# Patient Record
Sex: FEMALE | Race: BLACK OR AFRICAN AMERICAN | NOT HISPANIC OR LATINO | Employment: FULL TIME | URBAN - METROPOLITAN AREA
[De-identification: names, ages, dates, MRNs, and addresses within clinical notes are randomized per-mention and may not be internally consistent; named-entity substitution may affect disease eponyms.]

---

## 2019-06-10 ENCOUNTER — OFFICE VISIT (OUTPATIENT)
Dept: INTERNAL MEDICINE | Facility: CLINIC | Age: 39
End: 2019-06-10
Payer: COMMERCIAL

## 2019-06-10 VITALS
HEART RATE: 71 BPM | DIASTOLIC BLOOD PRESSURE: 80 MMHG | WEIGHT: 218.06 LBS | SYSTOLIC BLOOD PRESSURE: 125 MMHG | HEIGHT: 66 IN | BODY MASS INDEX: 35.04 KG/M2 | OXYGEN SATURATION: 98 %

## 2019-06-10 DIAGNOSIS — E66.9 CLASS 2 OBESITY WITH BODY MASS INDEX (BMI) OF 35.0 TO 35.9 IN ADULT, UNSPECIFIED OBESITY TYPE, UNSPECIFIED WHETHER SERIOUS COMORBIDITY PRESENT: ICD-10-CM

## 2019-06-10 DIAGNOSIS — R07.89 ANTERIOR CHEST WALL PAIN: ICD-10-CM

## 2019-06-10 DIAGNOSIS — G43.009 MIGRAINE WITHOUT AURA AND WITHOUT STATUS MIGRAINOSUS, NOT INTRACTABLE: ICD-10-CM

## 2019-06-10 DIAGNOSIS — Z12.39 BREAST CANCER SCREENING: ICD-10-CM

## 2019-06-10 DIAGNOSIS — D64.9 ANEMIA, UNSPECIFIED TYPE: ICD-10-CM

## 2019-06-10 DIAGNOSIS — J30.89 NON-SEASONAL ALLERGIC RHINITIS, UNSPECIFIED TRIGGER: ICD-10-CM

## 2019-06-10 DIAGNOSIS — J45.50 SEVERE PERSISTENT ASTHMA WITHOUT COMPLICATION: ICD-10-CM

## 2019-06-10 DIAGNOSIS — Z00.00 HEALTHCARE MAINTENANCE: Primary | ICD-10-CM

## 2019-06-10 DIAGNOSIS — H61.23 BILATERAL IMPACTED CERUMEN: ICD-10-CM

## 2019-06-10 DIAGNOSIS — R07.89 CHEST WALL PAIN: ICD-10-CM

## 2019-06-10 DIAGNOSIS — R51.9 HEADACHE DISORDER: ICD-10-CM

## 2019-06-10 PROBLEM — J45.909 UNCOMPLICATED ASTHMA: Status: ACTIVE | Noted: 2019-06-10

## 2019-06-10 PROBLEM — E66.812 CLASS 2 OBESITY WITH BODY MASS INDEX (BMI) OF 35.0 TO 35.9 IN ADULT: Status: ACTIVE | Noted: 2019-06-10

## 2019-06-10 PROBLEM — R87.619 ABNORMAL PAP SMEAR OF CERVIX: Status: RESOLVED | Noted: 2019-06-10 | Resolved: 2019-06-10

## 2019-06-10 PROCEDURE — 99999 PR PBB SHADOW E&M-NEW PATIENT-LVL V: ICD-10-PCS | Mod: PBBFAC,,, | Performed by: STUDENT IN AN ORGANIZED HEALTH CARE EDUCATION/TRAINING PROGRAM

## 2019-06-10 PROCEDURE — 99204 PR OFFICE/OUTPT VISIT, NEW, LEVL IV, 45-59 MIN: ICD-10-PCS | Mod: S$GLB,,, | Performed by: STUDENT IN AN ORGANIZED HEALTH CARE EDUCATION/TRAINING PROGRAM

## 2019-06-10 PROCEDURE — 99204 OFFICE O/P NEW MOD 45 MIN: CPT | Mod: S$GLB,,, | Performed by: STUDENT IN AN ORGANIZED HEALTH CARE EDUCATION/TRAINING PROGRAM

## 2019-06-10 PROCEDURE — 99999 PR PBB SHADOW E&M-NEW PATIENT-LVL V: CPT | Mod: PBBFAC,,, | Performed by: STUDENT IN AN ORGANIZED HEALTH CARE EDUCATION/TRAINING PROGRAM

## 2019-06-10 RX ORDER — BUTALBITAL, ACETAMINOPHEN AND CAFFEINE 50; 325; 40 MG/1; MG/1; MG/1
1 TABLET ORAL EVERY 6 HOURS PRN
Qty: 15 TABLET | Refills: 0 | Status: SHIPPED | OUTPATIENT
Start: 2019-06-10 | End: 2019-07-10

## 2019-06-10 RX ORDER — MONTELUKAST SODIUM 10 MG/1
10 TABLET ORAL NIGHTLY
Qty: 30 TABLET | Refills: 0 | Status: CANCELLED | OUTPATIENT
Start: 2019-06-10 | End: 2019-07-10

## 2019-06-10 RX ORDER — FLUTICASONE PROPIONATE 50 MCG
1 SPRAY, SUSPENSION (ML) NASAL DAILY
Qty: 1 BOTTLE | Refills: 0 | Status: SHIPPED | OUTPATIENT
Start: 2019-06-10 | End: 2021-04-22 | Stop reason: SDUPTHER

## 2019-06-10 RX ORDER — FLUTICASONE PROPIONATE AND SALMETEROL 250; 50 UG/1; UG/1
1 POWDER RESPIRATORY (INHALATION) DAILY
Qty: 60 EACH | Refills: 0 | Status: SHIPPED | OUTPATIENT
Start: 2019-06-10 | End: 2019-12-10

## 2019-06-10 NOTE — PROGRESS NOTES
INTERNAL MEDICINE RESIDENT CLINIC  CLINIC NOTE    Patient Name: Cristina Man  YOB: 1980    PRESENTING HISTORY       History of Present Illness:  Ms. Cristina Man is a 39 y.o. female w/ significant PMHx of asthma present to clinic to establish care.  C/o :    1- Left chest pain- lower inner side- aching/throbbing/  In -off- stays for couple of hours- no relieving factor- nothing worsen it- 7/10- tried OTC acetaminophen and tried muscle relaxer with some relif- no nipple discharge or itching-     2- Asthma- since childhood, uses rescue inhaler daily- wakes her up from sleep with wheeze. Never been intubated, not following up with anyone    3- allergic rhinitis- runny nose and runny eyes + sneezing and post nasal drip- improves with flonase.    PMHx: asthma since childhood- hx of menorrhgia and abnormal PAP.  PSHx: s/p hysterectomy and cervical removal.  Social:  with 2 children/ 24 and 22 of age - has multiple tattoos - never smoked or had any drug use- drinks rarely.  Allergies: NKD      Review of Systems:  Constitutional: no fever or chills  Eyes: no visual changes  ENT: no nasal congestion or sore throat  Respiratory: no cough or shortness of breath  Cardiovascular: no chest pain or palpitations  Gastrointestinal: no nausea or vomiting, no abdominal pain or change in bowel habits  Genitourinary: no hematuria or dysuria  Musculoskeletal: no arthralgias or myalgias  Neurological: no seizures or tremors    PAST HISTORY:     Past Medical History:   Diagnosis Date    Abnormal Pap smear of cervix     Allergic rhinitis due to allergen     Asthma at age 4       Past Surgical History:   Procedure Laterality Date    HEMORRHOID SURGERY  2009    HYSTERECTOMY  2015    HYSTERECTOMY-TOTAL LAPAROSCOPIC (TLH) N/A 4/30/2015    [facility]    [id number] N/A 4/30/2015    [facility]    SALPINGECTOMY-LAPAROSCOPIC  Bilateral 4/30/2015    [facility]       Family History   Problem Relation Age of Onset    Hypertension Mother     Cirrhosis Father         alcoholic    Hypertension Brother     Heart failure Maternal Grandmother     Hypertension Maternal Grandfather         DM    Lung cancer Paternal Grandmother     Breast cancer Neg Hx     Colon cancer Neg Hx     Ovarian cancer Neg Hx        Social History     Socioeconomic History    Marital status:      Spouse name: Not on file    Number of children: 2    Years of education: Not on file    Highest education level: Not on file   Occupational History    Occupation:       Employer: [employer]   Social Needs    Financial resource strain: Not on file    Food insecurity:     Worry: Not on file     Inability: Not on file    Transportation needs:     Medical: Not on file     Non-medical: Not on file   Tobacco Use    Smoking status: Never Smoker    Smokeless tobacco: Never Used   Substance and Sexual Activity    Alcohol use: No    Drug use: No    Sexual activity: Yes     Partners: Male     Birth control/protection: OCP   Lifestyle    Physical activity:     Days per week: Not on file     Minutes per session: Not on file    Stress: Not on file   Relationships    Social connections:     Talks on phone: Not on file     Gets together: Not on file     Attends Mandaen service: Not on file     Active member of club or organization: Not on file     Attends meetings of clubs or organizations: Not on file     Relationship status: Not on file   Other Topics Concern    Not on file   Social History Narrative    Not on file       MEDICATIONS & ALLERGIES:     Current Outpatient Medications on File Prior to Visit   Medication Sig    ALBUTEROL INHL Inhale into the lungs.    CETIRIZINE HCL (ZYRTEC ORAL) Take by mouth.    [DISCONTINUED] hydrocodone-acetaminophen 5-325mg (NORCO) 5-325 mg per  "tablet Take 1 tablet by mouth every 6 (six) hours as needed for Pain.    [DISCONTINUED] ibuprofen (ADVIL,MOTRIN) 800 MG tablet Take 1 tablet (800 mg total) by mouth 3 (three) times daily.     No current facility-administered medications on file prior to visit.        Review of patient's allergies indicates:  No Known Allergies    OBJECTIVE:   Vital Signs:  Vitals:    06/10/19 1322 06/10/19 1327   BP:  125/80   Pulse:  71   SpO2:  98%   Weight: 98.9 kg (218 lb 0.6 oz)    Height: 5' 6" (1.676 m)        No results found for this or any previous visit (from the past 24 hour(s)).      Physical Exam:   General:  Well developed, well nourished, no acute distress  HEENT:  Normocephalic, atraumatic, PERRL, EOMI, clear sclera, ears normal, throat clear without erythema or exudates  CVS:  RRR, S1 and S2 normal, no murmurs, rubs, gallops  Resp:  Lungs clear to auscultation, no wheezes, rales, rhonchi, cough  GI:  Abdomen soft, non-tender, non-distended, normoactive bowel sounds, no masses  MSK:  No muscle atrophy, cyanosis, peripheral edema, full range of motion  Skin:  No rashes, ulcers, erythema  Neuro:  CNII-XII grossly intact  Psych:  Alert and oriented to person, place, and time    ASSESSMENT & PLAN:     Cristina was seen today for establish care and breast pain.    Diagnoses and all orders for this visit:    Healthcare maintenance/ Breast cancer screening  -     Ambulatory Referral to Obstetrics / Gynecology  -     HIV 1/2 Ag/Ab (4th Gen); Future  -     Mammo Digital Screening Bilat with CAD; Future    Severe persistent asthma without complication    -     fluticasone-salmeterol diskus inhaler 250-50 mcg; Inhale 1 puff into the lungs Daily. Controller    Bilateral impacted cerumen  -     Ambulatory Referral to ENT    Non-seasonal allergic rhinitis, unspecified trigger        -     fluticasone propionate (FLONASE) 50 mcg/actuation nasal spray; 1 spray (50 mcg total) by Each Nare route once daily.  Headache disorder  -     " CBC auto differential; Future    Anemia, unspecified type  -     CBC auto differential; Future    Class 2 obesity with body mass index (BMI) of 35.0 to 35.9 in adult, unspecified obesity type, unspecified whether serious comorbidity present  -     Comprehensive metabolic panel; Future  -     Lipid panel; Future    Migraine without aura and without status migrainosus, not intractable    -     butalbital-acetaminophen-caffeine -40 mg (FIORICET, ESGIC) -40 mg per tablet; Take 1 tablet by mouth every 6 (six) hours as needed for Headaches.        Pt with sever uncontrolled asthma- will start with advair this visit and if not controlled in next appointment will consider adding montelukast.    For her Chest wall pain most likely MSK in nature advise to take ibuprofen, hot pads and topical antiinflammatory.    Migraines- will try Fioricet this visit.    Allergic rhinitis- advised to take daily antihistamine and flonase.    Needs vaccination next visit.       RTC in 3 months.      Arvind Hampton MD  Internal Medicine  PGY-2

## 2019-06-10 NOTE — PATIENT INSTRUCTIONS
For your symptoms (sinusitis):    1. Saline nasal spray ([facility]ceans) or nasal rinse (NeilMed) at least 2-3 times/day.    2. Flonase (fluticasone) - twice a day for 1 week, then once a day thereafter    3. Claritin (loratadine) or Zyrtec (cetirizine), or Allegra (fexofenadine) once a day.    4- Use the steroid inhaler Advair daily + albuterol inhaler as needed.    5- Blood work fasting.    6- BOOK ENT and OB/gyn appointment.    7- BOOK mammogram.

## 2019-06-13 NOTE — PROGRESS NOTES
"I have personally taken the history and examined this patient and agree with the resident's note as stated above with the following thoughts:  /80   Pulse 71   Ht 5' 6" (1.676 m)   Wt 98.9 kg (218 lb 0.6 oz)   LMP 04/27/2015   SpO2 98%   BMI 35.19 kg/m²     Chest pain seems skeletal muscular in nature.  WIll try to also get caught up on some of her screenings.  discussed diet and exercise.    "

## 2019-06-19 ENCOUNTER — TELEPHONE (OUTPATIENT)
Dept: HEPATOLOGY | Facility: HOSPITAL | Age: 39
End: 2019-06-19

## 2019-06-19 ENCOUNTER — HOSPITAL ENCOUNTER (OUTPATIENT)
Dept: RADIOLOGY | Facility: HOSPITAL | Age: 39
Discharge: HOME | End: 2019-06-19
Attending: STUDENT IN AN ORGANIZED HEALTH CARE EDUCATION/TRAINING PROGRAM
Payer: COMMERCIAL

## 2019-06-19 ENCOUNTER — TELEPHONE (OUTPATIENT)
Dept: INTERNAL MEDICINE | Facility: CLINIC | Age: 39
End: 2019-06-19

## 2019-06-19 DIAGNOSIS — Z12.39 BREAST CANCER SCREENING: ICD-10-CM

## 2019-06-19 PROCEDURE — 77063 MAMMO DIGITAL SCREENING BILAT WITH TOMOSYNTHESIS_CAD: ICD-10-PCS | Mod: 26,,, | Performed by: RADIOLOGY

## 2019-06-19 PROCEDURE — 77067 SCR MAMMO BI INCL CAD: CPT | Mod: 26,,, | Performed by: RADIOLOGY

## 2019-06-19 PROCEDURE — 77067 MAMMO DIGITAL SCREENING BILAT WITH TOMOSYNTHESIS_CAD: ICD-10-PCS | Mod: 26,,, | Performed by: RADIOLOGY

## 2019-06-19 PROCEDURE — 77067 SCR MAMMO BI INCL CAD: CPT | Mod: TC

## 2019-06-19 PROCEDURE — 77063 BREAST TOMOSYNTHESIS BI: CPT | Mod: 26,,, | Performed by: RADIOLOGY

## 2019-06-19 NOTE — TELEPHONE ENCOUNTER
----- Message from Nia Morrison sent at 6/19/2019  7:53 AM CDT -----  Patient is request a refill on albuterol pump and she would like it to be sent to this pharmacy [facility], She would like to receive a call from Berta Samuels to request other concerns.

## 2019-06-25 ENCOUNTER — TELEPHONE (OUTPATIENT)
Dept: OTOLARYNGOLOGY | Facility: CLINIC | Age: 39
End: 2019-06-25

## 2019-07-22 ENCOUNTER — OFFICE VISIT (OUTPATIENT)
Dept: OBSTETRICS AND GYNECOLOGY | Facility: CLINIC | Age: 39
End: 2019-07-22
Attending: OBSTETRICS & GYNECOLOGY
Payer: COMMERCIAL

## 2019-07-22 VITALS
WEIGHT: 217.13 LBS | DIASTOLIC BLOOD PRESSURE: 74 MMHG | BODY MASS INDEX: 35.05 KG/M2 | SYSTOLIC BLOOD PRESSURE: 126 MMHG

## 2019-07-22 DIAGNOSIS — N76.2 ACUTE VULVITIS: ICD-10-CM

## 2019-07-22 DIAGNOSIS — Z01.419 WELL WOMAN EXAM WITH ROUTINE GYNECOLOGICAL EXAM: Primary | ICD-10-CM

## 2019-07-22 PROCEDURE — 99999 PR PBB SHADOW E&M-EST. PATIENT-LVL II: CPT | Mod: PBBFAC,,, | Performed by: OBSTETRICS & GYNECOLOGY

## 2019-07-22 PROCEDURE — 99386 PREV VISIT NEW AGE 40-64: CPT | Mod: S$GLB,,, | Performed by: OBSTETRICS & GYNECOLOGY

## 2019-07-22 PROCEDURE — 99386 PR PREVENTIVE VISIT,NEW,40-64: ICD-10-PCS | Mod: S$GLB,,, | Performed by: OBSTETRICS & GYNECOLOGY

## 2019-07-22 PROCEDURE — 99999 PR PBB SHADOW E&M-EST. PATIENT-LVL II: ICD-10-PCS | Mod: PBBFAC,,, | Performed by: OBSTETRICS & GYNECOLOGY

## 2019-07-22 RX ORDER — NYSTATIN AND TRIAMCINOLONE ACETONIDE 100000; 1 [USP'U]/G; MG/G
CREAM TOPICAL
Qty: 30 G | Refills: 1 | Status: SHIPPED | OUTPATIENT
Start: 2019-07-22 | End: 2019-12-10

## 2019-07-22 NOTE — PROGRESS NOTES
Cristina Man is a 39 y.o. year old  who presents for annual exam.  She is S/P [facility] 2015.  Denies bleeding, flashes, and sweats.  She notes episodes of external irritation in her groin skin folds.  Denies abnormal discharge, itching, or odor.  No pelvic pain.      FINAL PATHOLOGIC DIAGNOSIS  UTERUS, CERVIX, RIGHT AND LEFT FALLOPIAN TUBES, HYSTERECTOMY AND BILATERAL  SALPINGECTOMY (95GM):  -Cervix with chronic inflammation. No dysplasia.  -Inactive endometrium, no hyperplasia or malignancy.  -Adenomyosis.  -Leiomyoma.  -Bilateral fallopian tubes with no significant histopathologic findings.       Past Medical History:   Diagnosis Date    Abnormal Pap smear of cervix     Allergic rhinitis due to allergen     Asthma at age 4       Past Surgical History:   Procedure Laterality Date    HEMORRHOID SURGERY      HYSTERECTOMY      HYSTERECTOMY-TOTAL LAPAROSCOPIC (TLH) N/A 2015    [facility]    [id number] N/A 2015    [facility]    SALPINGECTOMY-LAPAROSCOPIC Bilateral 2015    [facility] OR       OB History        2    Para   2    Term   2            AB        Living           SAB        TAB        Ectopic        Multiple        Live Births                     ROS:  GENERAL: Feeling well overall.   SKIN: Reports irritation in groin folds.  HEAD: Denies head injury or headache.   NODES: Denies enlarged lymph nodes.   CHEST: Denies chest pain or shortness of breath.   CARDIOVASCULAR: Denies palpitations or left sided chest pain.   ABDOMEN: No abdominal pain, nausea, vomiting or rectal bleeding.   URINARY: No dysuria or hematuria.  REPRODUCTIVE: See HPI.   BREASTS: Denies pain, lumps, or nipple discharge.   HEMATOLOGIC: No easy bruisability or excessive bleeding.   MUSCULOSKELETAL: Denies joint pain or swelling.   NEUROLOGIC: Denies syncope or weakness.    PSYCHIATRIC: Denies depression.    PE:  (chaperone present during entire exam)  APPEARANCE: Well nourished, well developed, in no acute distress.  NODES: No inguinal lymph node enlargement.  ABDOMEN: Soft. No tenderness or masses. No hernias.  BREASTS: Symmetrical, no skin changes or visible lesions. No palpable masses, nipple discharge or adenopathy bilaterally.  PELVIC: Normal external female genitalia without lesions. Normal hair distribution. Adequate perineal body, normal urethral meatus. Vagina without lesions or discharge. No significant cystocele or rectocele. Uterus and cervix surgically absent. Bimanual exam revealed no masses, tenderness or abnormality.  ANUS: Normal.    Diagnosis:  1. Well woman exam with routine gynecological exam    2. Acute vulvitis          PLAN:    Orders Placed This Encounter    nystatin-triamcinolone (MYCOLOG II) cream       Patient was counseled today on aixa / postmenopausal issues.  We also discussed her irritation involving her groin skin folds.  She will try Mycolog cream and let us know if not resolved.     Follow-up in 1 year.

## 2019-08-12 ENCOUNTER — OFFICE VISIT (OUTPATIENT)
Dept: INTERNAL MEDICINE | Facility: CLINIC | Age: 39
End: 2019-08-12
Payer: COMMERCIAL

## 2019-08-12 VITALS
DIASTOLIC BLOOD PRESSURE: 80 MMHG | SYSTOLIC BLOOD PRESSURE: 126 MMHG | HEIGHT: 66 IN | WEIGHT: 221.56 LBS | BODY MASS INDEX: 35.61 KG/M2 | OXYGEN SATURATION: 96 % | HEART RATE: 74 BPM

## 2019-08-12 DIAGNOSIS — L30.9 DERMATITIS: Primary | ICD-10-CM

## 2019-08-12 PROCEDURE — 99214 PR OFFICE/OUTPT VISIT, EST, LEVL IV, 30-39 MIN: ICD-10-PCS | Mod: S$GLB,,, | Performed by: INTERNAL MEDICINE

## 2019-08-12 PROCEDURE — 99999 PR PBB SHADOW E&M-EST. PATIENT-LVL III: CPT | Mod: PBBFAC,,, | Performed by: INTERNAL MEDICINE

## 2019-08-12 PROCEDURE — 99214 OFFICE O/P EST MOD 30 MIN: CPT | Mod: S$GLB,,, | Performed by: INTERNAL MEDICINE

## 2019-08-12 PROCEDURE — 99999 PR PBB SHADOW E&M-EST. PATIENT-LVL III: ICD-10-PCS | Mod: PBBFAC,,, | Performed by: INTERNAL MEDICINE

## 2019-08-12 RX ORDER — MUPIROCIN 20 MG/G
OINTMENT TOPICAL 3 TIMES DAILY
Qty: 15 G | Refills: 0 | Status: SHIPPED | OUTPATIENT
Start: 2019-08-12 | End: 2019-12-10

## 2019-08-12 RX ORDER — TRIAMCINOLONE ACETONIDE 1 MG/G
OINTMENT TOPICAL 2 TIMES DAILY
Qty: 15 G | Refills: 0 | Status: SHIPPED | OUTPATIENT
Start: 2019-08-12 | End: 2019-12-10

## 2019-08-12 NOTE — PROGRESS NOTES
"Subjective:       Patient ID: Cristina Man is a 39 y.o. female.    Chief Complaint: Blister (on lip x 1 month/ lip swelling and dryness after returning from [place])    HPI    Patient of Arvind Hampton MD, here today for Urgent Care visit.     Patient reports last month she was on a trip to [place].  When she returned her lips were very dry and peeling and blistering.  She started to apply over-the-counter lip balm which seemed to help, but blistering and dryness continued.  Continued to have peeling of the skin.  Only affecting the lips, not the mouth or other skin.  She also tried camphor with menthol.  Today she woke up with the lips very swollen and blistered.  She has no other skin blistering, and has never had these symptoms before.  She does work outside during the days.  Along with these symptoms the lips are burning and aching.  No history of cold sores.    Review of Systems   All other systems reviewed and are negative.      Objective:      Physical Exam   Constitutional: She is oriented to person, place, and time. No distress.   HENT:   Right Ear: No tenderness.   Left Ear: No tenderness.   Mouth/Throat: Oropharynx is clear and moist. No oropharyngeal exudate.   tympanic membrane obscured by cerumen bilaterally    Neck: Normal range of motion. No thyromegaly present.   Lymphadenopathy:     She has no cervical adenopathy.   Neurological: She is alert and oriented to person, place, and time.   Skin: Skin is warm and dry.   Dry skin along upper and lower lips bilaterally along with mild blistering but no vesicles   Psychiatric: She has a normal mood and affect. Her behavior is normal.   Nursing note and vitals reviewed.      Vitals:    08/12/19 1815   BP: 126/80   BP Location: Right arm   Patient Position: Sitting   BP Method: Medium (Manual)   Pulse: 74   SpO2: 96%   Weight: 100.5 kg (221 lb 9 oz)   Height: 5' 6" (1.676 m)     Body mass index is 35.76 kg/m².    RESULTS: Reviewed labs from last 12 " "months    Assessment:       1. Dermatitis        Plan:   Cristina was seen today for blister.    Diagnoses and all orders for this visit:    Dermatitis:  New problem. possibly due to sunburn and subsequent lichenification. treatment with steroid and antibiotics. If HSV positive start anti-viral. If no improvement refer to Dermatology.  "Use the triamcinolone (Kenalog) ointment to the lips two times a day. You can also use other lip balms during the day. If after 1 week there's no improvement please notify the office."  -     triamcinolone acetonide 0.1% (KENALOG) 0.1 % ointment; Apply topically 2 (two) times daily.  -     mupirocin (BACTROBAN) 2 % ointment; Apply topically 3 (three) times daily.  -     CBC Without Differential; Future  -     Basic metabolic panel; Future  -     HERPES SIMPLEX 1 & 2 IGM; Future  -     HERPES SIMPLEX 1&2 IGG; Future    No follow-ups on file.  Jordan Johnson MD  Internal Medicine    Portions of this note were completed using medical dictation software. Please excuse typographical or syntax errors that were missed on review.    "

## 2019-08-12 NOTE — PATIENT INSTRUCTIONS
Use the triamcinolone (Kenalog) ointment to the lips two times a day. You can also use other lip balms during the day. If after 1 week there's no improvement please notify the office.

## 2019-08-13 ENCOUNTER — TELEPHONE (OUTPATIENT)
Dept: INTERNAL MEDICINE | Facility: CLINIC | Age: 39
End: 2019-08-13

## 2019-08-13 NOTE — TELEPHONE ENCOUNTER
----- Message from Marybeth Casanova sent at 8/13/2019  8:56 AM CDT -----  Regarding: Lab Client Services  Contact: [phone]  Hi my name is Marybeth I work in the Lab Client Services. We had a problem with some lab work on this patient. If someone from your office could call us at [phone] or [phone] that would be great. Anyone in my department can help. Thank you

## 2019-08-14 ENCOUNTER — TELEPHONE (OUTPATIENT)
Dept: INTERNAL MEDICINE | Facility: CLINIC | Age: 39
End: 2019-08-14

## 2019-08-14 DIAGNOSIS — B00.1 COLD SORE: Primary | ICD-10-CM

## 2019-08-14 RX ORDER — VALACYCLOVIR HYDROCHLORIDE 1 G/1
2000 TABLET, FILM COATED ORAL 2 TIMES DAILY
Qty: 4 TABLET | Refills: 2 | Status: SHIPPED | OUTPATIENT
Start: 2019-08-14 | End: 2019-12-10

## 2019-08-14 NOTE — TELEPHONE ENCOUNTER
Please call the patient to notify that her test for the virus that causes cold sores is positive. Please ask if the lip blisters are persisting, if so I will start an antiviral medication. otherwise she can continue the antibiotic and steroid creams for now.

## 2019-09-09 PROBLEM — Z00.00 HEALTHCARE MAINTENANCE: Status: RESOLVED | Noted: 2019-06-10 | Resolved: 2019-09-09

## 2019-10-09 ENCOUNTER — OFFICE VISIT (OUTPATIENT)
Dept: PLASTIC SURGERY | Facility: CLINIC | Age: 39
End: 2019-10-09
Payer: COMMERCIAL

## 2019-10-09 VITALS
HEART RATE: 97 BPM | SYSTOLIC BLOOD PRESSURE: 150 MMHG | WEIGHT: 217.06 LBS | BODY MASS INDEX: 35.03 KG/M2 | DIASTOLIC BLOOD PRESSURE: 88 MMHG

## 2019-10-09 DIAGNOSIS — N62 MACROMASTIA: Primary | ICD-10-CM

## 2019-10-09 DIAGNOSIS — R21 EXCORIATED RASH: ICD-10-CM

## 2019-10-09 DIAGNOSIS — M54.2 NECK PAIN, CHRONIC: ICD-10-CM

## 2019-10-09 DIAGNOSIS — M54.9 CHRONIC BACK PAIN, UNSPECIFIED BACK LOCATION, UNSPECIFIED BACK PAIN LATERALITY: ICD-10-CM

## 2019-10-09 DIAGNOSIS — G89.29 CHRONIC BACK PAIN, UNSPECIFIED BACK LOCATION, UNSPECIFIED BACK PAIN LATERALITY: ICD-10-CM

## 2019-10-09 DIAGNOSIS — G89.29 NECK PAIN, CHRONIC: ICD-10-CM

## 2019-10-09 PROCEDURE — 99203 OFFICE O/P NEW LOW 30 MIN: CPT | Mod: S$GLB,,, | Performed by: PHYSICIAN ASSISTANT

## 2019-10-09 PROCEDURE — 99999 PR PBB SHADOW E&M-EST. PATIENT-LVL III: CPT | Mod: PBBFAC,,, | Performed by: PHYSICIAN ASSISTANT

## 2019-10-09 PROCEDURE — 99203 PR OFFICE/OUTPT VISIT, NEW, LEVL III, 30-44 MIN: ICD-10-PCS | Mod: S$GLB,,, | Performed by: PHYSICIAN ASSISTANT

## 2019-10-09 PROCEDURE — 99999 PR PBB SHADOW E&M-EST. PATIENT-LVL III: ICD-10-PCS | Mod: PBBFAC,,, | Performed by: PHYSICIAN ASSISTANT

## 2019-10-14 NOTE — PROGRESS NOTES
History & Physical    SUBJECTIVE:   Chief complaint: large breasts    History of Present Illness:    Cristina Man presents to [facility] on 10/9/2019 for evaluation for bilateral breast reduction secondary to symptomatic bilateral large pendulous breast. She has a chief complaint of chronic neck and back pain for 5 years. She has tried Narcotics, Tylenol and NSAID without alleviation of pain. She also complains of deep shoulder grooving from her bra straps as well as macerating rashes below each breast that have not significantly improved with application of medicated ointments and creams.  She currently reports a bra size of 42DDD. She is employee of [employer].. She denies smoking tobacco or the use of any nicotine containing products.       Past Medical History:   Diagnosis Date    Abnormal Pap smear of cervix     Allergic rhinitis due to allergen     Asthma at age 4       Past Surgical History:   Procedure Laterality Date    HEMORRHOID SURGERY  2009    HYSTERECTOMY  2015       Family History   Problem Relation Age of Onset    Hypertension Mother     Cirrhosis Father         alcoholic    Hypertension Brother     Heart failure Maternal Grandmother     Hypertension Maternal Grandfather         DM    Lung cancer Paternal Grandmother     Breast cancer Neg Hx     Colon cancer Neg Hx     Ovarian cancer Neg Hx        Social History     Socioeconomic History    Marital status:      Spouse name: Not on file    Number of children: 2    Years of education: Not on file    Highest education level: Not on file   Occupational History    Occupation:       Employer: [employer]   Social Needs    Financial resource strain: Not on file    Food insecurity:     Worry: Not on file     Inability: Not on file    Transportation needs:     Medical: Not on file     Non-medical: Not on file   Tobacco Use    Smoking status:  Never Smoker    Smokeless tobacco: Never Used   Substance and Sexual Activity    Alcohol use: No    Drug use: No    Sexual activity: Yes     Partners: Male     Birth control/protection: None   Lifestyle    Physical activity:     Days per week: Not on file     Minutes per session: Not on file    Stress: Not on file   Relationships    Social connections:     Talks on phone: Not on file     Gets together: Not on file     Attends Hoahaoism service: Not on file     Active member of club or organization: Not on file     Attends meetings of clubs or organizations: Not on file     Relationship status: Not on file   Other Topics Concern    Not on file   Social History Narrative    Not on file       Current Outpatient Medications   Medication Sig Dispense Refill    ALBUTEROL INHL Inhale into the lungs.      CETIRIZINE HCL (ZYRTEC ORAL) Take by mouth.      fluticasone propionate (FLONASE) 50 mcg/actuation nasal spray 1 spray (50 mcg total) by Each Nare route once daily. 1 Bottle 0    fluticasone-salmeterol diskus inhaler 250-50 mcg Inhale 1 puff into the lungs Daily. Controller 60 each 0    mupirocin (BACTROBAN) 2 % ointment Apply topically 3 (three) times daily. 15 g 0    nystatin-triamcinolone (MYCOLOG II) cream Apply to affected area 2 times daily 30 g 1    triamcinolone acetonide 0.1% (KENALOG) 0.1 % ointment Apply topically 2 (two) times daily. 15 g 0    valACYclovir (VALTREX) 1000 MG tablet Take 2 tablets (2,000 mg total) by mouth 2 (two) times daily. for 1 day 4 tablet 2     No current facility-administered medications for this visit.        Review of patient's allergies indicates:  No Known Allergies      Review of Systems:    Review of Systems   HENT: Positive for neck pain.    Musculoskeletal: Positive for back pain.   Neurological: Negative for headaches or dizziness      OBJECTIVE:     BP (!) 150/88   Pulse 97   Wt 98.5 kg (217 lb 0.7 oz)   LMP 04/27/2015   BMI 35.03 kg/m²       Physical  Exam:    Physical Exam   Constitutional: She is oriented to person, place, and time. She appears well-developed and well-nourished.   Neck: Normal range of motion. Neck supple. No tracheal deviation present.   Cardiovascular: Normal rate, regular rhythm and normal heart sounds.    Pulmonary/Chest: Effort normal and breath sounds normal. bilaterally enlarged breasts, evidence of previous rashes, shoulder grooving, no palpable masses, nipple everted  Abdominal: Soft. Bowel sounds are normal.   Musculoskeletal: Normal range of motion.   Neurological: She is alert and oriented to person, place, and time.   Skin: Skin is warm.       Last MMG July 2019 at [facility].     ASSESSMENT/PLAN:     1.Symptomatic Bilateral Macromastia  2. Chronic neck pain  3. Chronic back pain  4. Chronic breast rashes    PLAN:Plan    -Will need 800 gm reduction per side  -Will submit paper work for insurance approval  -Photos to be obtained by [employer], order placed.   -Risk, benefits, and alternatives explained. She understands that the risks include but are not limited to bleeding, scarring, infection, pain, numbness, asymmetry, deformity, open wound, skin necrosis, wound dehiscence, permanent or temporary loss of sensation to the nipple, partial or total nipple loss requiring removal, poor cosmetic outcome, hematoma, seroma and pulmonary emobolus.   - Patient would like to proceed with scheduling bilateral breast reduction pending insurance authorization. She understands that this will not be submitted until January 2020 as we are fully booked for 2019. Patient voiced understanding.     All questions were answered. The patient was advised to call the clinic with any questions or concerns prior to their next visit.

## 2019-10-16 ENCOUNTER — PATIENT MESSAGE (OUTPATIENT)
Dept: PLASTIC SURGERY | Facility: CLINIC | Age: 39
End: 2019-10-16

## 2019-10-28 ENCOUNTER — OFFICE VISIT (OUTPATIENT)
Dept: PLASTIC SURGERY | Facility: CLINIC | Age: 39
End: 2019-10-28
Payer: COMMERCIAL

## 2019-10-28 VITALS
WEIGHT: 216.06 LBS | BODY MASS INDEX: 34.72 KG/M2 | HEART RATE: 88 BPM | DIASTOLIC BLOOD PRESSURE: 83 MMHG | HEIGHT: 66 IN | SYSTOLIC BLOOD PRESSURE: 154 MMHG

## 2019-10-28 DIAGNOSIS — M54.9 CHRONIC BACK PAIN, UNSPECIFIED BACK LOCATION, UNSPECIFIED BACK PAIN LATERALITY: ICD-10-CM

## 2019-10-28 DIAGNOSIS — M54.2 CHRONIC NECK PAIN: ICD-10-CM

## 2019-10-28 DIAGNOSIS — L29.8 CHRONIC PRURITIC RASH IN ADULT: ICD-10-CM

## 2019-10-28 DIAGNOSIS — G89.29 CHRONIC NECK PAIN: ICD-10-CM

## 2019-10-28 DIAGNOSIS — G89.29 CHRONIC BACK PAIN, UNSPECIFIED BACK LOCATION, UNSPECIFIED BACK PAIN LATERALITY: ICD-10-CM

## 2019-10-28 DIAGNOSIS — N62 MACROMASTIA: Primary | ICD-10-CM

## 2019-10-28 PROCEDURE — 99214 OFFICE O/P EST MOD 30 MIN: CPT | Mod: S$GLB,,, | Performed by: SURGERY

## 2019-10-28 PROCEDURE — 99999 PR PBB SHADOW E&M-EST. PATIENT-LVL III: ICD-10-PCS | Mod: PBBFAC,,, | Performed by: SURGERY

## 2019-10-28 PROCEDURE — 99999 PR PBB SHADOW E&M-EST. PATIENT-LVL III: CPT | Mod: PBBFAC,,, | Performed by: SURGERY

## 2019-10-28 PROCEDURE — 99214 PR OFFICE/OUTPT VISIT, EST, LEVL IV, 30-39 MIN: ICD-10-PCS | Mod: S$GLB,,, | Performed by: SURGERY

## 2019-11-12 NOTE — PROGRESS NOTES
History & Physical  Plastic and Reconstructive Surgery  Breast Reduction Consult    SUBJECTIVE:   Chief complaint: large breasts    History of Present Illness:  Patient is a 39 y.o. female presents with symptomatic bilateral large pendulous  breasts. States that she has back and neck pain for greater than 5 years. Takes muscle relaxant, ibuprofen, and tylenol for this. She also participates in physical therapy and exercise program to relieve back and neck pain for 7 years. Complaints of shoulder grooving from enzo straps and rashes under the breast for which she uses baby powder and cream, and triple oitment abx. Also has macerating rashes during summer time. She is active.    Past Medical History:   Diagnosis Date    Abnormal Pap smear of cervix     Allergic rhinitis due to allergen     Asthma at age 4       Past Surgical History:   Procedure Laterality Date    HEMORRHOID SURGERY  2009    HYSTERECTOMY  2015       Family History   Problem Relation Age of Onset    Hypertension Mother     Cirrhosis Father         alcoholic    Hypertension Brother     Heart failure Maternal Grandmother     Hypertension Maternal Grandfather         DM    Lung cancer Paternal Grandmother     Breast cancer Neg Hx     Colon cancer Neg Hx     Ovarian cancer Neg Hx        Social History     Socioeconomic History    Marital status:      Spouse name: Not on file    Number of children: 2    Years of education: Not on file    Highest education level: Not on file   Occupational History    Occupation:       Employer: [employer]   Social Needs    Financial resource strain: Not on file    Food insecurity:     Worry: Not on file     Inability: Not on file    Transportation needs:     Medical: Not on file     Non-medical: Not on file   Tobacco Use    Smoking status: Never Smoker    Smokeless tobacco: Never Used   Substance and Sexual Activity    Alcohol use: No    Drug  use: No    Sexual activity: Yes     Partners: Male     Birth control/protection: None   Lifestyle    Physical activity:     Days per week: Not on file     Minutes per session: Not on file    Stress: Not on file   Relationships    Social connections:     Talks on phone: Not on file     Gets together: Not on file     Attends Cheondoism service: Not on file     Active member of club or organization: Not on file     Attends meetings of clubs or organizations: Not on file     Relationship status: Not on file   Other Topics Concern    Not on file   Social History Narrative    Not on file       Current Outpatient Medications   Medication Sig Dispense Refill    ALBUTEROL INHL Inhale into the lungs.      CETIRIZINE HCL (ZYRTEC ORAL) Take by mouth.      fluticasone propionate (FLONASE) 50 mcg/actuation nasal spray 1 spray (50 mcg total) by Each Nare route once daily. (Patient not taking: Reported on 10/28/2019) 1 Bottle 0    fluticasone-salmeterol diskus inhaler 250-50 mcg Inhale 1 puff into the lungs Daily. Controller (Patient not taking: Reported on 10/28/2019) 60 each 0    mupirocin (BACTROBAN) 2 % ointment Apply topically 3 (three) times daily. (Patient not taking: Reported on 10/28/2019) 15 g 0    nystatin-triamcinolone (MYCOLOG II) cream Apply to affected area 2 times daily (Patient not taking: Reported on 10/28/2019) 30 g 1    triamcinolone acetonide 0.1% (KENALOG) 0.1 % ointment Apply topically 2 (two) times daily. (Patient not taking: Reported on 10/28/2019) 15 g 0    valACYclovir (VALTREX) 1000 MG tablet Take 2 tablets (2,000 mg total) by mouth 2 (two) times daily. for 1 day 4 tablet 2     No current facility-administered medications for this visit.        Review of patient's allergies indicates:  No Known Allergies      Review of Systems:    Review of Systems   HENT: Positive for neck pain.    Musculoskeletal: Positive for back pain.   Neurological: Positive for headaches. dizziness    Pertinent 12  "point ROS negative except as listed above & per intake form.      OBJECTIVE:     BP (!) 154/83   Pulse 88   Ht 5' 6" (1.676 m)   Wt 98 kg (216 lb 0.8 oz)   LMP 04/27/2015   BMI 34.87 kg/m²     Physical Exam:    Physical Exam   Constitutional: She is oriented to person, place, and time. She appears well-developed and well-nourished.   Neck: Normal range of motion. Neck supple. No tracheal deviation present.   Cardiovascular: Normal rate, regular rhythm and normal heart sounds.    Pulmonary/Chest: Effort normal and breath sounds normal. bilaterally enlarged breasts, evidence of previous rashes, shoulder grooving, no palpable masses, nipple everted  Abdominal: Soft. Bowel sounds are normal.   Musculoskeletal: Normal range of motion.   Neurological: She is alert and oriented to person, place, and time.   Skin: Skin is warm.     Labs Personally reviewed  Lab Results   Component Value Date    WBC 6.57 08/12/2019    HGB 12.1 08/12/2019    HCT 37.0 08/12/2019    MCV 94 08/12/2019     08/12/2019     CMP  BMP  Lab Results   Component Value Date     06/19/2019    K 3.9 06/19/2019     06/19/2019    CO2 25 06/19/2019    BUN 10 06/19/2019    CREATININE 0.8 06/19/2019    CALCIUM 9.0 06/19/2019    ANIONGAP 10 06/19/2019    ESTGFRAFRICA >60 06/19/2019    EGFRNONAA >60 06/19/2019     Lab Results   Component Value Date    ALT 18 06/19/2019    AST 15 06/19/2019    ALKPHOS 91 06/19/2019    BILITOT 0.3 06/19/2019     No results found for: INR, PROTIME    Last MMG   History:  Patient is 39 y.o. and is seen for a screening mammogram.     Films Compared:  No prior studies were available for comparison.      Findings:  Computer-aided detection was utilized in the interpretation of this examination. This procedure was performed using tomosynthesis.       The breasts are heterogeneously dense, which may obscure small masses. There is no evidence of suspicious masses, microcalcifications or architectural " distortion.     Impression:  No mammographic evidence of malignancy.     BI-RADS Category 1: Negative     Recommendation:  Routine screening mammogram in 1 year is recommended.     The patient's estimated lifetime risk of breast cancer (to age 85) based on Tyrer-Cuzick - 7 risk assessment model is: Tyrer-Cuzick: 9.58 %. According to the American Cancer Society,  patients with a lifetime breast cancer risk of 20% or higher might benefit from supplemental screening tests.    Encounter     View Encounter             Signed by     Signed Credentials Date/Time  Phone Pager   CORNELIA BARRAGAN MD 6/20/2019 08:43 [phone]    Reviewed By     Arvind Hampton MD on 7/9/2019 12:47         ASSESSMENT/PLAN:     1.Symptomatic Bilateral Macromastia  2. Chronic neck pain  3. Chronic back pain  4. Chronic breast rashes    PLAN:    -Will need 800 gm reduction per side  -Will submit paper work for insurance approval  -Photos obtained  -Risk, benefits, and alternatives explained.    The bilateral breast reductionprocedure was discussed in detail with the patient as were the risks and possible complications which include but are not limited to bleeding, scarring, infection, pain, numbness, asymmetry, deformity, large open wound, skin necrosis, wound dehiscence, permanent or temporary loss of sensation to the nipple(s), partial or total loss of the nipple(s), free nipple graft, death, brain damage, pulmonary embolus and need for further surgery.     Risk of breast reduction discussed with the patient as it relates to possible inability to breast feed in the future, need to be of stable weight for 6 months to avoid post operative contour or size changes, altered projection and depigmentation or patchy pigmentation associated with areola reconstruction.    30 minutes was spend with patient, more than 50% was spent explaining the nature of the procedure, location of incisions, expected recovery or answering questions,  coordinating care related to breast reduction.    CPT 08718  4 hours    Plastic & Reconstructive Surgery  [facility]  c/o Marvin Levine M.D.  [facility]  [address]  [address] [place] [address]    Work [phone]  Toll free [phone]  If no answer [phone]

## 2019-12-09 ENCOUNTER — PATIENT MESSAGE (OUTPATIENT)
Dept: PLASTIC SURGERY | Facility: CLINIC | Age: 39
End: 2019-12-09

## 2019-12-10 ENCOUNTER — IMMUNIZATION (OUTPATIENT)
Dept: INTERNAL MEDICINE | Facility: CLINIC | Age: 39
End: 2019-12-10
Payer: COMMERCIAL

## 2019-12-10 ENCOUNTER — OFFICE VISIT (OUTPATIENT)
Dept: INTERNAL MEDICINE | Facility: CLINIC | Age: 39
End: 2019-12-10
Payer: COMMERCIAL

## 2019-12-10 VITALS
HEART RATE: 72 BPM | OXYGEN SATURATION: 99 % | WEIGHT: 213.19 LBS | HEIGHT: 66 IN | DIASTOLIC BLOOD PRESSURE: 98 MMHG | SYSTOLIC BLOOD PRESSURE: 142 MMHG | BODY MASS INDEX: 34.26 KG/M2

## 2019-12-10 DIAGNOSIS — J45.20 MILD INTERMITTENT ASTHMA WITH ALLERGIC RHINITIS WITHOUT COMPLICATION: ICD-10-CM

## 2019-12-10 DIAGNOSIS — I10 BENIGN ESSENTIAL HYPERTENSION: ICD-10-CM

## 2019-12-10 DIAGNOSIS — R73.03 PREDIABETES: ICD-10-CM

## 2019-12-10 DIAGNOSIS — E66.09 CLASS 1 OBESITY DUE TO EXCESS CALORIES WITH SERIOUS COMORBIDITY AND BODY MASS INDEX (BMI) OF 34.0 TO 34.9 IN ADULT: ICD-10-CM

## 2019-12-10 DIAGNOSIS — R35.89 POLYURIA: ICD-10-CM

## 2019-12-10 DIAGNOSIS — Z00.00 VISIT FOR ANNUAL HEALTH EXAMINATION: Primary | ICD-10-CM

## 2019-12-10 PROBLEM — D64.9 ANEMIA: Status: RESOLVED | Noted: 2019-06-10 | Resolved: 2019-12-10

## 2019-12-10 LAB
BILIRUB UR QL STRIP: NEGATIVE
CLARITY UR REFRACT.AUTO: CLEAR
COLOR UR AUTO: YELLOW
GLUCOSE UR QL STRIP: NEGATIVE
HGB UR QL STRIP: NEGATIVE
KETONES UR QL STRIP: NEGATIVE
LEUKOCYTE ESTERASE UR QL STRIP: NEGATIVE
NITRITE UR QL STRIP: NEGATIVE
PH UR STRIP: 5 [PH] (ref 5–8)
PROT UR QL STRIP: NEGATIVE
SP GR UR STRIP: 1.02 (ref 1–1.03)
URN SPEC COLLECT METH UR: NORMAL

## 2019-12-10 PROCEDURE — 90686 FLU VACCINE (QUAD) GREATER THAN OR EQUAL TO 3YO PRESERVATIVE FREE IM: ICD-10-PCS | Mod: S$GLB,,, | Performed by: INTERNAL MEDICINE

## 2019-12-10 PROCEDURE — 81003 URINALYSIS AUTO W/O SCOPE: CPT

## 2019-12-10 PROCEDURE — 99214 PR OFFICE/OUTPT VISIT, EST, LEVL IV, 30-39 MIN: ICD-10-PCS | Mod: 25,S$GLB,, | Performed by: INTERNAL MEDICINE

## 2019-12-10 PROCEDURE — 90472 IMMUNIZATION ADMIN EACH ADD: CPT | Mod: S$GLB,,, | Performed by: INTERNAL MEDICINE

## 2019-12-10 PROCEDURE — 90732 PNEUMOCOCCAL POLYSACCHARIDE VACCINE 23-VALENT =>2YO SQ IM: ICD-10-PCS | Mod: S$GLB,,, | Performed by: INTERNAL MEDICINE

## 2019-12-10 PROCEDURE — 99999 PR PBB SHADOW E&M-EST. PATIENT-LVL IV: CPT | Mod: PBBFAC,,, | Performed by: INTERNAL MEDICINE

## 2019-12-10 PROCEDURE — 90686 IIV4 VACC NO PRSV 0.5 ML IM: CPT | Mod: S$GLB,,, | Performed by: INTERNAL MEDICINE

## 2019-12-10 PROCEDURE — 99214 OFFICE O/P EST MOD 30 MIN: CPT | Mod: 25,S$GLB,, | Performed by: INTERNAL MEDICINE

## 2019-12-10 PROCEDURE — 90732 PPSV23 VACC 2 YRS+ SUBQ/IM: CPT | Mod: S$GLB,,, | Performed by: INTERNAL MEDICINE

## 2019-12-10 PROCEDURE — 90472 PR IMMUNIZ,ADMIN,EACH ADDL: ICD-10-PCS | Mod: S$GLB,,, | Performed by: INTERNAL MEDICINE

## 2019-12-10 PROCEDURE — 90471 PNEUMOCOCCAL POLYSACCHARIDE VACCINE 23-VALENT =>2YO SQ IM: ICD-10-PCS | Mod: S$GLB,,, | Performed by: INTERNAL MEDICINE

## 2019-12-10 PROCEDURE — 90471 IMMUNIZATION ADMIN: CPT | Mod: S$GLB,,, | Performed by: INTERNAL MEDICINE

## 2019-12-10 PROCEDURE — 99999 PR PBB SHADOW E&M-EST. PATIENT-LVL IV: ICD-10-PCS | Mod: PBBFAC,,, | Performed by: INTERNAL MEDICINE

## 2019-12-10 RX ORDER — AMLODIPINE BESYLATE 5 MG/1
5 TABLET ORAL DAILY
Qty: 90 TABLET | Refills: 3 | Status: SHIPPED | OUTPATIENT
Start: 2019-12-10 | End: 2021-04-22 | Stop reason: SDUPTHER

## 2019-12-10 RX ORDER — BUDESONIDE AND FORMOTEROL FUMARATE DIHYDRATE 80; 4.5 UG/1; UG/1
2 AEROSOL RESPIRATORY (INHALATION) 2 TIMES DAILY
Qty: 10.2 G | Refills: 5 | Status: SHIPPED | OUTPATIENT
Start: 2019-12-10 | End: 2019-12-10 | Stop reason: SDUPTHER

## 2019-12-10 RX ORDER — ALBUTEROL SULFATE 90 UG/1
2 AEROSOL, METERED RESPIRATORY (INHALATION) EVERY 6 HOURS PRN
Qty: 18 G | Refills: 3 | Status: SHIPPED | OUTPATIENT
Start: 2019-12-10 | End: 2021-04-22 | Stop reason: SDUPTHER

## 2019-12-10 RX ORDER — MONTELUKAST SODIUM 10 MG/1
10 TABLET ORAL NIGHTLY
Qty: 90 TABLET | Refills: 3 | Status: SHIPPED | OUTPATIENT
Start: 2019-12-10 | End: 2019-12-10 | Stop reason: SDUPTHER

## 2019-12-10 RX ORDER — BUDESONIDE AND FORMOTEROL FUMARATE DIHYDRATE 80; 4.5 UG/1; UG/1
2 AEROSOL RESPIRATORY (INHALATION) 2 TIMES DAILY
Qty: 10.2 G | Refills: 5 | Status: SHIPPED | OUTPATIENT
Start: 2019-12-10 | End: 2021-04-22 | Stop reason: ALTCHOICE

## 2019-12-10 RX ORDER — MONTELUKAST SODIUM 10 MG/1
10 TABLET ORAL NIGHTLY
Qty: 90 TABLET | Refills: 3 | Status: SHIPPED | OUTPATIENT
Start: 2019-12-10 | End: 2020-01-09

## 2019-12-10 NOTE — PROGRESS NOTES
INTERNAL MEDICINE INITIAL VISIT NOTE      CHIEF COMPLAINT     Chief Complaint   Patient presents with    Establish Care       HPI     Cristina Man is a 39 y.o. AA female who presents with mild intermittent asthma c allergic rhinitis (prev on Advair and flonase but currently not on meds), hx migraines, hx anemia resolved on last few checks, obesity, preDM (noted on labs from 2011, 5.9) former pt in resident clinic, here today to establish care and for annual exam.    Today c/o urinary frequency for several months.  Denies dysuria.  Some polydipsia    Reports asthma sx have been poorly controlled.  Does not feel Advair is helping despite regular usage in the past.  Uses her albuterol 4-5 times/day for the past few months.  No PFTs on file.  Reports she has been wheezing and has had some sob.    Also reports some chronic nasal congestion.  No fever.  Mild sore throat intermittently.    Past Medical History:  Past Medical History:   Diagnosis Date    Abnormal Pap smear of cervix     Allergic rhinitis due to allergen     Asthma at age 4       Past Surgical History:  Past Surgical History:   Procedure Laterality Date    HEMORRHOID SURGERY  2009    HYSTERECTOMY  2015       Home Medications:  Prior to Admission medications    Medication Sig Start Date End Date Taking? Authorizing Provider   ALBUTEROL INHL Inhale into the lungs.    Historical Provider, MD   CETIRIZINE HCL (ZYRTEC ORAL) Take by mouth.    Historical Provider, MD   fluticasone propionate (FLONASE) 50 mcg/actuation nasal spray 1 spray (50 mcg total) by Each Nare route once daily.  Patient not taking: Reported on 10/28/2019 6/10/19   Arvind Hampton MD   fluticasone-salmeterol diskus inhaler 250-50 mcg Inhale 1 puff into the lungs Daily. Controller  Patient not taking: Reported on 10/28/2019 6/10/19 6/9/20  Arvind Hampton MD   mupirocin (BACTROBAN) 2 % ointment Apply topically 3 (three) times daily.  Patient not taking: Reported on  10/28/2019 8/12/19   Jordan Johnson MD   nystatin-triamcinolone (MYCOLOG II) cream Apply to affected area 2 times daily  Patient not taking: Reported on 10/28/2019 7/22/19 7/21/20  Isaias Jovel MD   triamcinolone acetonide 0.1% (KENALOG) 0.1 % ointment Apply topically 2 (two) times daily.  Patient not taking: Reported on 10/28/2019 8/12/19   Jordan Johnson MD   valACYclovir (VALTREX) 1000 MG tablet Take 2 tablets (2,000 mg total) by mouth 2 (two) times daily. for 1 day 8/14/19 8/15/19  Jordan Johnson MD       Allergies:  Review of patient's allergies indicates:  No Known Allergies    Family History:  Family History   Problem Relation Age of Onset    Hypertension Mother     Cirrhosis Father         alcoholic    Hypertension Brother     Heart failure Maternal Grandmother     Hypertension Maternal Grandmother     Diabetes Maternal Grandmother     Hypertension Maternal Grandfather         DM    Diabetes Maternal Grandfather     Lung cancer Paternal Grandmother     Hypertension Paternal Grandmother     Diabetes Paternal Grandmother     Breast cancer Neg Hx     Colon cancer Neg Hx     Ovarian cancer Neg Hx        Social History:  Social History     Tobacco Use    Smoking status: Never Smoker    Smokeless tobacco: Never Used   Substance Use Topics    Alcohol use: No    Drug use: No       Review of Systems:  Review of Systems   Constitutional: Negative for appetite change, chills, fatigue, fever and unexpected weight change.   HENT: Negative for congestion, hearing loss and rhinorrhea.    Eyes: Negative for pain and visual disturbance.   Respiratory: Negative for cough, chest tightness, shortness of breath and wheezing.    Cardiovascular: Negative for chest pain, palpitations and leg swelling.   Gastrointestinal: Negative for abdominal distention and abdominal pain.   Endocrine: Negative for polydipsia and polyuria.   Genitourinary: Negative for decreased urine volume, difficulty urinating, dysuria,  "hematuria and vaginal discharge.   Neurological: Negative for weakness, numbness and headaches.   Psychiatric/Behavioral: Negative for behavioral problems and confusion.       Health Maintenance:   Immunizations:   Influenza rec today  TDap rec at f/u  Pneumovax rec today based on dx of asthma    Cancer Screening:  PAP: hx hyst 2/2 irregular bleeding, states cervix removed, benign, still has ovaries, sees Dr. Jovel, last seen 7/2019  Mammogram:  6/2019 benign, rec repeat in one year  Colonoscopy:  rec at 49      PHYSICAL EXAM     BP (!) 142/98   Pulse 72   Ht 5' 6" (1.676 m)   Wt 96.7 kg (213 lb 3 oz)   LMP 04/27/2015   SpO2 99%   BMI 34.41 kg/m²     GEN - A+OX4, NAD   HEENT - PERRL, EOMI, OP clear  Neck - No thyromegaly or thyroid masses felt.  No cervical lymphadenopathy appreciated.  CV - RRR, no m/r/g  Chest - CTAB x slight exp wheeze on LML, no crackles or rhonchi  Abd - S/NT/ND/+BS.   Ext - 2+BDP. No C/C/E.  LN - No LAD appreciated.  Skin - Normal color and texture, no rash, no skin lesions.      LABS     Lab Results   Component Value Date    WBC 6.57 08/12/2019    HGB 12.1 08/12/2019    HCT 37.0 08/12/2019    MCV 94 08/12/2019     08/12/2019     Sodium   Date Value Ref Range Status   06/19/2019 141 136 - 145 mmol/L Final     Potassium   Date Value Ref Range Status   06/19/2019 3.9 3.5 - 5.1 mmol/L Final     Chloride   Date Value Ref Range Status   06/19/2019 106 95 - 110 mmol/L Final     CO2   Date Value Ref Range Status   06/19/2019 25 23 - 29 mmol/L Final     Glucose   Date Value Ref Range Status   06/19/2019 106 70 - 110 mg/dL Final     BUN, Bld   Date Value Ref Range Status   06/19/2019 10 6 - 20 mg/dL Final     Creatinine   Date Value Ref Range Status   06/19/2019 0.8 0.5 - 1.4 mg/dL Final     Calcium   Date Value Ref Range Status   06/19/2019 9.0 8.7 - 10.5 mg/dL Final     Total Protein   Date Value Ref Range Status   06/19/2019 7.5 6.0 - 8.4 g/dL Final     Albumin   Date Value Ref Range " Status   06/19/2019 3.9 3.5 - 5.2 g/dL Final     Total Bilirubin   Date Value Ref Range Status   06/19/2019 0.3 0.1 - 1.0 mg/dL Final     Comment:     For infants and newborns, interpretation of results should be based  on gestational age, weight and in agreement with clinical  observations.  Premature Infant recommended reference ranges:  Up to 24 hours.............<8.0 mg/dL  Up to 48 hours............<12.0 mg/dL  3-5 days..................<15.0 mg/dL  6-29 days.................<15.0 mg/dL       Alkaline Phosphatase   Date Value Ref Range Status   06/19/2019 91 55 - 135 U/L Final     AST   Date Value Ref Range Status   06/19/2019 15 10 - 40 U/L Final     ALT   Date Value Ref Range Status   06/19/2019 18 10 - 44 U/L Final     Anion Gap   Date Value Ref Range Status   06/19/2019 10 8 - 16 mmol/L Final     eGFR if    Date Value Ref Range Status   06/19/2019 >60 >60 mL/min/1.73 m^2 Final     eGFR if non    Date Value Ref Range Status   06/19/2019 >60 >60 mL/min/1.73 m^2 Final     Comment:     Calculation used to obtain the estimated glomerular filtration  rate (eGFR) is the CKD-EPI equation.        Lab Results   Component Value Date    HGBA1C 5.9 10/19/2011     Lab Results   Component Value Date    LDLCALC 122.0 06/19/2019     Lab Results   Component Value Date    TSH 0.551 10/02/2014       ASSESSMENT/PLAN     Cristina Man is a 39 y.o. female with  Cristina was seen today for establish care.    Diagnoses and all orders for this visit:    Visit for annual health examination  History and physical exam completed.  Health maintenance reviewed as above.    Mild intermittent asthma with allergic rhinitis without complication  Poorly controlled, will change advair to symbicort since advair not effective in the past  Will also check PFTs and refer to pulm for eval  Since also c allergic component will also add singulair  Emphasized compliance c meds and avoid triggers  -     Ambulatory referral  to Pulmonology  -     Complete PFT with bronchodilator; Future  -     PULSE OXIMETRY WITH REST - PULM; Future  -     montelukast (SINGULAIR) 10 mg tablet; Take 1 tablet (10 mg total) by mouth every evening.  -     budesonide-formoterol 80-4.5 mcg (SYMBICORT) 80-4.5 mcg/actuation HFAA; Inhale 2 puffs into the lungs 2 (two) times daily. Controller  -     albuterol (PROVENTIL/VENTOLIN HFA) 90 mcg/actuation inhaler; Inhale 2 puffs into the lungs every 6 (six) hours as needed. Rescue    Benign essential hypertension  New dx  Based on numbers will start meds  -     amLODIPine (NORVASC) 5 MG tablet; Take 1 tablet (5 mg total) by mouth once daily.    Polyuria  Concern for DM given hx prediabetes, will recheck a1c as well as urine studies  -     Hemoglobin A1c; Future; Expected date: 12/10/2019  -     URINALYSIS    Class 1 obesity due to excess calories with serious comorbidity and body mass index (BMI) of 34.0 to 34.9 in adult  Counseled extensively on need for diet changes and daily exercise.  Discussed examples of healthy eating.  Recommend at least 30 minutes of exercise at least 5 days a week.    -     TSH; Future; Expected date: 12/10/2019  -     Ambulatory Referral to Bariatric Medicine    HM as above    RTC in 4 months c Shanita benavides in one mo for bp check, sooner if needed and depending on labs.    Madelaine Hayden MD  Department of Internal Medicine - [facility]  12/10/2019

## 2020-01-07 ENCOUNTER — TELEPHONE (OUTPATIENT)
Dept: PLASTIC SURGERY | Facility: CLINIC | Age: 40
End: 2020-01-07

## 2020-01-07 NOTE — TELEPHONE ENCOUNTER
called pt and let her know tht her authorization had been mailed out. I left my direct office number for pt to call back if she had any further questions.                 ----- Message from Marcos Mcbride RN sent at 1/2/2020  3:56 PM CST -----  Pt would like to know if her surgery has been submitted. Please contact at your earliest convenience.

## 2020-01-09 ENCOUNTER — PATIENT OUTREACH (OUTPATIENT)
Dept: ADMINISTRATIVE | Facility: OTHER | Age: 40
End: 2020-01-09

## 2020-01-10 ENCOUNTER — OFFICE VISIT (OUTPATIENT)
Dept: INTERNAL MEDICINE | Facility: CLINIC | Age: 40
End: 2020-01-10
Payer: COMMERCIAL

## 2020-01-10 ENCOUNTER — HOSPITAL ENCOUNTER (OUTPATIENT)
Dept: PULMONOLOGY | Facility: CLINIC | Age: 40
Discharge: HOME | End: 2020-01-10
Payer: COMMERCIAL

## 2020-01-10 VITALS
BODY MASS INDEX: 34.4 KG/M2 | WEIGHT: 214.06 LBS | OXYGEN SATURATION: 97 % | DIASTOLIC BLOOD PRESSURE: 80 MMHG | HEIGHT: 66 IN | SYSTOLIC BLOOD PRESSURE: 120 MMHG | HEART RATE: 79 BPM

## 2020-01-10 DIAGNOSIS — J34.3 NASAL TURBINATE HYPERTROPHY: ICD-10-CM

## 2020-01-10 DIAGNOSIS — I10 BENIGN ESSENTIAL HYPERTENSION: Primary | ICD-10-CM

## 2020-01-10 DIAGNOSIS — G43.009 MIGRAINE WITHOUT AURA AND WITHOUT STATUS MIGRAINOSUS, NOT INTRACTABLE: ICD-10-CM

## 2020-01-10 DIAGNOSIS — J45.20 MILD INTERMITTENT ASTHMA WITH ALLERGIC RHINITIS WITHOUT COMPLICATION: ICD-10-CM

## 2020-01-10 LAB
DLCO ADJ PRE: 22.14 ML/(MIN*MMHG) (ref 21.12–32.59)
DLCO SINGLE BREATH LLN: 21.12
DLCO SINGLE BREATH PRE REF: 82.4 %
DLCO SINGLE BREATH REF: 26.85
DLCOC SBVA LLN: 3.63
DLCOC SBVA PRE REF: 104.3 %
DLCOC SBVA REF: 5.07
DLCOC SINGLE BREATH LLN: 21.12
DLCOC SINGLE BREATH PRE REF: 82.4 %
DLCOC SINGLE BREATH REF: 26.85
DLCOCSBVAULN: 6.51
DLCOCSINGLEBREATHULN: 32.59
DLCOSINGLEBREATHULN: 32.59
DLCOVA LLN: 3.63
DLCOVA PRE REF: 104.3 %
DLCOVA PRE: 5.29 ML/(MIN*MMHG*L) (ref 3.63–6.51)
DLCOVA REF: 5.07
DLCOVAULN: 6.51
DLVAADJ PRE: 5.29 ML/(MIN*MMHG*L) (ref 3.63–6.51)
ERVN2 LLN: 1.11
ERVN2 PRE REF: 77.7 %
ERVN2 PRE: 0.86 L (ref 1.11–1.11)
ERVN2 REF: 1.11
ERVN2ULN: 1.11
FEF 25 75 LLN: 1.55
FEF 25 75 PRE REF: 98.3 %
FEF 25 75 REF: 2.9
FEV05 LLN: 1.36
FEV05 REF: 2.21
FEV1 FVC LLN: 72
FEV1 FVC PRE REF: 97.8 %
FEV1 FVC REF: 82
FEV1 LLN: 2.14
FEV1 PRE REF: 99.4 %
FEV1 REF: 2.77
FRCN2 LLN: 1.98
FRCN2 PRE REF: 69.2 %
FRCN2 REF: 2.8
FRCN2ULN: 3.63
FVC LLN: 2.64
FVC PRE REF: 101.2 %
FVC REF: 3.38
IVC PRE: 2.97 L (ref 2.64–4.12)
IVC SINGLE BREATH LLN: 2.64
IVC SINGLE BREATH PRE REF: 87.8 %
IVC SINGLE BREATH REF: 3.38
IVCSINGLEBREATHULN: 4.12
PEF LLN: 4.79
PEF PRE REF: 80.5 %
PEF REF: 6.93
PHYSICIAN COMMENT: ABNORMAL
PHYSICIAN COMMENT: ABNORMAL
PRE DLCO: 22.14 ML/(MIN*MMHG) (ref 21.12–32.59)
PRE FEF 25 75: 2.85 L/S (ref 1.55–4.25)
PRE FET 100: 7.45 SEC
PRE FEV05 REF: 96.6 %
PRE FEV1 FVC: 80.43 % (ref 71.62–92.93)
PRE FEV1: 2.75 L (ref 2.14–3.39)
PRE FEV5: 2.14 L (ref 1.36–3.07)
PRE FRC N2: 1.94 L
PRE FVC: 3.42 L (ref 2.64–4.12)
PRE PEF: 5.58 L/S (ref 4.79–9.07)
RVN2 LLN: 1.12
RVN2 PRE REF: 63.6 %
RVN2 PRE: 1.08 L (ref 1.12–2.27)
RVN2 REF: 1.7
RVN2TLCN2 LLN: 23.31
RVN2TLCN2 PRE REF: 73.9 %
RVN2TLCN2 PRE: 24.32 % (ref 23.31–42.49)
RVN2TLCN2 REF: 32.9
RVN2TLCN2ULN: 42.49
RVN2ULN: 2.27
TLCN2 LLN: 4.31
TLCN2 PRE REF: 83.8 %
TLCN2 PRE: 4.44 L (ref 4.31–6.29)
TLCN2 REF: 5.3
TLCN2ULN: 6.29
VA PRE: 4.19 L (ref 5.15–5.15)
VA SINGLE BREATH LLN: 5.15
VA SINGLE BREATH PRE REF: 81.4 %
VA SINGLE BREATH REF: 5.15
VASINGLEBREATHULN: 5.15
VCMAXN2 LLN: 2.64
VCMAXN2 PRE REF: 99.5 %
VCMAXN2 PRE: 3.36 L (ref 2.64–4.12)
VCMAXN2 REF: 3.38
VCMAXN2ULN: 4.12

## 2020-01-10 PROCEDURE — 99213 PR OFFICE/OUTPT VISIT, EST, LEVL III, 20-29 MIN: ICD-10-PCS | Mod: S$GLB,,, | Performed by: NURSE PRACTITIONER

## 2020-01-10 PROCEDURE — 99999 PR PBB SHADOW E&M-EST. PATIENT-LVL IV: CPT | Mod: PBBFAC,,, | Performed by: NURSE PRACTITIONER

## 2020-01-10 PROCEDURE — 99213 OFFICE O/P EST LOW 20 MIN: CPT | Mod: S$GLB,,, | Performed by: NURSE PRACTITIONER

## 2020-01-10 PROCEDURE — 94729 PR C02/MEMBANE DIFFUSE CAPACITY: ICD-10-PCS | Mod: S$GLB,,, | Performed by: INTERNAL MEDICINE

## 2020-01-10 PROCEDURE — 94010 BREATHING CAPACITY TEST: ICD-10-PCS | Mod: S$GLB,,, | Performed by: INTERNAL MEDICINE

## 2020-01-10 PROCEDURE — 94727 PR PULM FUNCTION TEST BY GAS: ICD-10-PCS | Mod: S$GLB,,, | Performed by: INTERNAL MEDICINE

## 2020-01-10 PROCEDURE — 94010 BREATHING CAPACITY TEST: CPT | Mod: S$GLB,,, | Performed by: INTERNAL MEDICINE

## 2020-01-10 PROCEDURE — 94729 DIFFUSING CAPACITY: CPT | Mod: S$GLB,,, | Performed by: INTERNAL MEDICINE

## 2020-01-10 PROCEDURE — 94727 GAS DIL/WSHOT DETER LNG VOL: CPT | Mod: S$GLB,,, | Performed by: INTERNAL MEDICINE

## 2020-01-10 PROCEDURE — 99999 PR PBB SHADOW E&M-EST. PATIENT-LVL IV: ICD-10-PCS | Mod: PBBFAC,,, | Performed by: NURSE PRACTITIONER

## 2020-01-10 RX ORDER — AZELASTINE 1 MG/ML
1 SPRAY, METERED NASAL 2 TIMES DAILY
Qty: 30 ML | Refills: 0 | Status: SHIPPED | OUTPATIENT
Start: 2020-01-10 | End: 2021-04-22 | Stop reason: SDUPTHER

## 2020-01-10 NOTE — PROGRESS NOTES
INTERNAL MEDICINE PROGRESS NOTE    CHIEF COMPLAINT     Chief Complaint   Patient presents with    Follow-up       HPI     Cristina Man is a 40 y.o. female with HTN, int asthma, pre-dm, and obesity who presents for a follow up visit today.  She is an established pt of Dr Hayden - last seen 12/10/19    Here for BP check - BP elevated at last visit   Started on amlodipine - BP at home 120-140/80-90    Still having c/o dizziness- but not spinning or off balance. No lightheadedness   No pain in head.     Headaches- Has h/o sinus headaches with sinus pressure and fullness. Also with eye pressure - last eye 2 years ago.   Also describes occaaional tension to the neck   Relived with Tylenol and ibuprofen   Treats sinus pressure with Flonase and OTC medications       Past Medical History:  Past Medical History:   Diagnosis Date    Abnormal Pap smear of cervix     Allergic rhinitis due to allergen     Asthma at age 4       Home Medications:  Prior to Admission medications    Medication Sig Start Date End Date Taking? Authorizing Provider   albuterol (PROVENTIL/VENTOLIN HFA) 90 mcg/actuation inhaler Inhale 2 puffs into the lungs every 6 (six) hours as needed. Rescue 12/10/19 12/9/20  Madelaine Hayden MD   amLODIPine (NORVASC) 5 MG tablet Take 1 tablet (5 mg total) by mouth once daily. 12/10/19 12/9/20  Madelaine Hayden MD   budesonide-formoterol 80-4.5 mcg (SYMBICORT) 80-4.5 mcg/actuation HFAA Inhale 2 puffs into the lungs 2 (two) times daily. Controller 12/10/19 12/9/20  Madelaine Hayden MD   CETIRIZINE HCL (ZYRTEC ORAL) Take by mouth.    Historical Provider, MD   fluticasone propionate (FLONASE) 50 mcg/actuation nasal spray 1 spray (50 mcg total) by Each Nare route once daily. 6/10/19   Arvind Hampton MD   montelukast (SINGULAIR) 10 mg tablet Take 1 tablet (10 mg total) by mouth every evening. 12/10/19 1/9/20  Madelaine Hayden MD       Review of Systems:  Review of Systems   Constitutional: Negative for chills, fatigue and fever.  "  HENT: Positive for congestion, postnasal drip and rhinorrhea.    Eyes: Negative for visual disturbance.   Respiratory: Negative for cough, shortness of breath and wheezing.    Cardiovascular: Negative for chest pain and palpitations.   Neurological: Positive for dizziness and headaches. Negative for light-headedness.       Health Maintainence:   Immunizations:  Health Maintenance       Date Due Completion Date    TETANUS VACCINE 09/12/2017 9/12/2007    Mammogram 06/19/2021 6/19/2019           PHYSICAL EXAM     /84 (BP Location: Left arm, Patient Position: Sitting, BP Method: Large (Manual))   Pulse 79   Ht 5' 6" (1.676 m)   Wt 97.1 kg (214 lb 1.1 oz)   LMP 04/27/2015   SpO2 97%   BMI 34.55 kg/m²     Physical Exam   Constitutional: She is oriented to person, place, and time. She appears well-developed and well-nourished.   HENT:   Head: Normocephalic.   Right Ear: Tympanic membrane and external ear normal.   Left Ear: Tympanic membrane and external ear normal.   Nose: Mucosal edema and rhinorrhea present.   Mouth/Throat: Oropharynx is clear and moist. No oropharyngeal exudate.   Eyes: Pupils are equal, round, and reactive to light.   Neck: Neck supple. No JVD present. No tracheal deviation present. No thyromegaly present.   Cardiovascular: Normal rate, regular rhythm, normal heart sounds and intact distal pulses. Exam reveals no gallop and no friction rub.   No murmur heard.  Pulmonary/Chest: Effort normal and breath sounds normal. No respiratory distress. She has no wheezes. She has no rales.   Abdominal: Soft. Bowel sounds are normal. She exhibits no distension. There is no tenderness.   Musculoskeletal: Normal range of motion. She exhibits no edema or tenderness.   Lymphadenopathy:     She has no cervical adenopathy.   Neurological: She is alert and oriented to person, place, and time.   Skin: Skin is warm and dry. No rash noted.   Psychiatric: She has a normal mood and affect. Her behavior is " normal.   Vitals reviewed.      LABS     Lab Results   Component Value Date    HGBA1C 5.9 (H) 12/10/2019     CMP  Sodium   Date Value Ref Range Status   06/19/2019 141 136 - 145 mmol/L Final     Potassium   Date Value Ref Range Status   06/19/2019 3.9 3.5 - 5.1 mmol/L Final     Chloride   Date Value Ref Range Status   06/19/2019 106 95 - 110 mmol/L Final     CO2   Date Value Ref Range Status   06/19/2019 25 23 - 29 mmol/L Final     Glucose   Date Value Ref Range Status   06/19/2019 106 70 - 110 mg/dL Final     BUN, Bld   Date Value Ref Range Status   06/19/2019 10 6 - 20 mg/dL Final     Creatinine   Date Value Ref Range Status   06/19/2019 0.8 0.5 - 1.4 mg/dL Final     Calcium   Date Value Ref Range Status   06/19/2019 9.0 8.7 - 10.5 mg/dL Final     Total Protein   Date Value Ref Range Status   06/19/2019 7.5 6.0 - 8.4 g/dL Final     Albumin   Date Value Ref Range Status   06/19/2019 3.9 3.5 - 5.2 g/dL Final     Total Bilirubin   Date Value Ref Range Status   06/19/2019 0.3 0.1 - 1.0 mg/dL Final     Comment:     For infants and newborns, interpretation of results should be based  on gestational age, weight and in agreement with clinical  observations.  Premature Infant recommended reference ranges:  Up to 24 hours.............<8.0 mg/dL  Up to 48 hours............<12.0 mg/dL  3-5 days..................<15.0 mg/dL  6-29 days.................<15.0 mg/dL       Alkaline Phosphatase   Date Value Ref Range Status   06/19/2019 91 55 - 135 U/L Final     AST   Date Value Ref Range Status   06/19/2019 15 10 - 40 U/L Final     ALT   Date Value Ref Range Status   06/19/2019 18 10 - 44 U/L Final     Anion Gap   Date Value Ref Range Status   06/19/2019 10 8 - 16 mmol/L Final     eGFR if    Date Value Ref Range Status   06/19/2019 >60 >60 mL/min/1.73 m^2 Final     eGFR if non    Date Value Ref Range Status   06/19/2019 >60 >60 mL/min/1.73 m^2 Final     Comment:     Calculation used to obtain the  estimated glomerular filtration  rate (eGFR) is the CKD-EPI equation.        Lab Results   Component Value Date    WBC 6.57 08/12/2019    HGB 12.1 08/12/2019    HCT 37.0 08/12/2019    MCV 94 08/12/2019     08/12/2019     Lab Results   Component Value Date    CHOL 199 06/19/2019    CHOL 183 11/28/2008     Lab Results   Component Value Date    HDL 63 06/19/2019    HDL 51 11/28/2008     Lab Results   Component Value Date    LDLCALC 122.0 06/19/2019    LDLCALC 120.0 11/28/2008     Lab Results   Component Value Date    TRIG 70 06/19/2019    TRIG 60 11/28/2008     Lab Results   Component Value Date    CHOLHDL 31.7 06/19/2019    CHOLHDL 27.9 11/28/2008     Lab Results   Component Value Date    TSH 0.759 12/10/2019       ASSESSMENT/PLAN     Cristina Man is a 40 y.o. female with  Past Medical History:   Diagnosis Date    Abnormal Pap smear of cervix     Allergic rhinitis due to allergen     Asthma at age 4     Benign essential hypertension- at goal. Stable. Will cont current meds. Low na diet     Migraine without aura and without status migrainosus, not intractable- worsening - allergies contributing. Will cont current meds. May use nsaids as needed. Keep headache journal.     Nasal turbinate hypertrophy- worsening. Contributing to headaches and dizziness. Will cont flonase and add astelin. Refer to ENT   -     azelastine (ASTELIN) 137 mcg (0.1 %) nasal spray; 1 spray (137 mcg total) by Nasal route 2 (two) times daily.  Dispense: 30 mL; Refill: 0  -     Ambulatory Referral to ENT    Follow up with PCP    Patient education provided from Yolanda. Patient was counseled on when and how to seek emergent care.       Shanita DAVID, APRN, FNP-c   Department of Internal Medicine - [facility]  2:52 PM

## 2020-01-15 ENCOUNTER — PATIENT OUTREACH (OUTPATIENT)
Dept: ADMINISTRATIVE | Facility: OTHER | Age: 40
End: 2020-01-15

## 2020-02-02 ENCOUNTER — PATIENT OUTREACH (OUTPATIENT)
Dept: ADMINISTRATIVE | Facility: OTHER | Age: 40
End: 2020-02-02

## 2020-03-04 ENCOUNTER — TELEPHONE (OUTPATIENT)
Dept: PLASTIC SURGERY | Facility: CLINIC | Age: 40
End: 2020-03-04

## 2020-03-04 NOTE — TELEPHONE ENCOUNTER
Spoke with pt and let her know that case was approved and that I would call her once I look up some available dates . Pt verbalized understanding.

## 2020-03-05 DIAGNOSIS — N62 MACROMASTIA: Primary | ICD-10-CM

## 2020-03-23 ENCOUNTER — TELEPHONE (OUTPATIENT)
Dept: INTERNAL MEDICINE | Facility: CLINIC | Age: 40
End: 2020-03-23

## 2020-03-23 NOTE — TELEPHONE ENCOUNTER
----- Message from Hailey Barrett sent at 3/23/2020  9:35 AM CDT -----  Contact: Pt   Patient is requesting a callback regarding getting a order placed in the system for COVID-19 testing . Please give the patient a callback at [phone].

## 2020-03-24 ENCOUNTER — TELEPHONE (OUTPATIENT)
Dept: INTERNAL MEDICINE | Facility: CLINIC | Age: 40
End: 2020-03-24

## 2020-03-24 NOTE — TELEPHONE ENCOUNTER
----- Message from Jeanine Huang sent at 3/24/2020 11:59 AM CDT -----  Contact: Self   Patient is returning a phone call.  Who left a message for the patient: Mendez Jay LPN   Does patient know what this is regarding:  Virus test  Comments:   Pt went to local clinic and was tested.

## 2020-04-13 ENCOUNTER — PATIENT MESSAGE (OUTPATIENT)
Dept: OBSTETRICS AND GYNECOLOGY | Facility: CLINIC | Age: 40
End: 2020-04-13

## 2020-04-13 ENCOUNTER — TELEPHONE (OUTPATIENT)
Dept: OBSTETRICS AND GYNECOLOGY | Facility: CLINIC | Age: 40
End: 2020-04-13

## 2020-04-13 DIAGNOSIS — N39.0 URINARY TRACT INFECTION WITHOUT HEMATURIA, SITE UNSPECIFIED: Primary | ICD-10-CM

## 2020-04-13 NOTE — TELEPHONE ENCOUNTER
Which location and when?  ===View-only below this line===      ----- Message -----     From: Cristina Man     Sent: 4/13/2020  4:37 PM CDT       To: Isaias Jovel MD  Subject: RE: Prescription    Ok that would be fine  ----- Message -----  From: Med Assistant Sánchez  Sent: 4/13/2020  4:08 PM CDT  To: Cristina Man  Subject: RE: Prescription  Dr Jovel can order an urine test for you. [facility] can [facility] any [facility] lab. Which one works better for you? And when? Princess    ----- Message -----     From: Cristina Man     Sent: 4/13/2020  1:45 PM CDT       To: Isaias Jovel MD  Subject: Prescription    Hi! Dr. Jovel This Is Cristina Man I Wanted To Know If You Can Call Something In For Me. I Think I Have A UTI Are A Overactive Bladder. I Been Using The Bathroom every 30 mins For The Last Month.

## 2020-04-14 ENCOUNTER — TELEPHONE (OUTPATIENT)
Dept: OBSTETRICS AND GYNECOLOGY | Facility: CLINIC | Age: 40
End: 2020-04-14

## 2020-04-14 ENCOUNTER — LAB VISIT (OUTPATIENT)
Dept: LAB | Facility: OTHER | Age: 40
End: 2020-04-14
Attending: OBSTETRICS & GYNECOLOGY
Payer: COMMERCIAL

## 2020-04-14 ENCOUNTER — PATIENT MESSAGE (OUTPATIENT)
Dept: OBSTETRICS AND GYNECOLOGY | Facility: CLINIC | Age: 40
End: 2020-04-14

## 2020-04-14 DIAGNOSIS — N39.0 URINARY TRACT INFECTION WITHOUT HEMATURIA, SITE UNSPECIFIED: ICD-10-CM

## 2020-04-14 LAB
BILIRUB UR QL STRIP: NEGATIVE
CLARITY UR: CLEAR
COLOR UR: YELLOW
GLUCOSE UR QL STRIP: NEGATIVE
HGB UR QL STRIP: NEGATIVE
KETONES UR QL STRIP: NEGATIVE
LEUKOCYTE ESTERASE UR QL STRIP: NEGATIVE
NITRITE UR QL STRIP: NEGATIVE
PH UR STRIP: 6 [PH] (ref 5–8)
PROT UR QL STRIP: NEGATIVE
SP GR UR STRIP: 1.02 (ref 1–1.03)
URN SPEC COLLECT METH UR: NORMAL
UROBILINOGEN UR STRIP-ACNC: NEGATIVE EU/DL

## 2020-04-14 PROCEDURE — 81003 URINALYSIS AUTO W/O SCOPE: CPT

## 2020-04-14 PROCEDURE — 87086 URINE CULTURE/COLONY COUNT: CPT

## 2020-04-14 NOTE — TELEPHONE ENCOUNTER
Please let her know that he urinalysis was normal.  The urine culture is still pending at this time.  Thanks.

## 2020-04-15 ENCOUNTER — TELEPHONE (OUTPATIENT)
Dept: OBSTETRICS AND GYNECOLOGY | Facility: CLINIC | Age: 40
End: 2020-04-15

## 2020-04-15 NOTE — TELEPHONE ENCOUNTER
Cristina Man 19 minutes ago (11:37 AM)         Ok but I might need to come in  because this is getting overwhelming with my bladder. I can stop using the bathroom. I am going at least 15-20 times a day.          You  Cristina Man 19 hours ago (4:41 PM)         Isaias Jovel MD 1 minute ago (4:38 PM)    Please let her know that he urinalysis was normal.   The urine culture is still pending at this time.   Thanks.       I will let you know when the culture is back. This can take 48-72 hours. Princess

## 2020-04-15 NOTE — TELEPHONE ENCOUNTER
Please let her know that she may need to see a urologist for evaluation if her urine culture is negative.  I can discuss with her in more detail with Virtual Visit once culture returns.

## 2020-04-16 ENCOUNTER — PATIENT MESSAGE (OUTPATIENT)
Dept: OBSTETRICS AND GYNECOLOGY | Facility: CLINIC | Age: 40
End: 2020-04-16

## 2020-04-16 ENCOUNTER — TELEPHONE (OUTPATIENT)
Dept: ADMINISTRATIVE | Facility: CLINIC | Age: 40
End: 2020-04-16

## 2020-04-16 LAB — BACTERIA UR CULT: NORMAL

## 2020-04-16 NOTE — TELEPHONE ENCOUNTER
Cristina Man is a 40 y.o. female contacted the clinic with the chief complaint of urinary frequency.     The patient's urine culture is negative.     After discussing with the supervising physician, Dr. Jovel, patient instructed to follow up with a virtual visit to discuss medication options for over-active bladder.

## 2020-04-20 ENCOUNTER — PATIENT OUTREACH (OUTPATIENT)
Dept: ADMINISTRATIVE | Facility: OTHER | Age: 40
End: 2020-04-20

## 2020-04-20 ENCOUNTER — OFFICE VISIT (OUTPATIENT)
Dept: OBSTETRICS AND GYNECOLOGY | Facility: CLINIC | Age: 40
End: 2020-04-20
Attending: OBSTETRICS & GYNECOLOGY
Payer: COMMERCIAL

## 2020-04-20 DIAGNOSIS — Z90.710 HISTORY OF HYSTERECTOMY: ICD-10-CM

## 2020-04-20 DIAGNOSIS — N32.81 OVERACTIVE BLADDER: Primary | ICD-10-CM

## 2020-04-20 PROCEDURE — 99213 OFFICE O/P EST LOW 20 MIN: CPT | Mod: 95,,, | Performed by: OBSTETRICS & GYNECOLOGY

## 2020-04-20 PROCEDURE — 99213 PR OFFICE/OUTPT VISIT, EST, LEVL III, 20-29 MIN: ICD-10-PCS | Mod: 95,,, | Performed by: OBSTETRICS & GYNECOLOGY

## 2020-04-20 RX ORDER — TOLTERODINE 4 MG/1
4 CAPSULE, EXTENDED RELEASE ORAL DAILY
Qty: 30 CAPSULE | Refills: 2 | Status: SHIPPED | OUTPATIENT
Start: 2020-04-20 | End: 2021-04-22 | Stop reason: SDUPTHER

## 2020-04-20 NOTE — PROGRESS NOTES
"TELEMEDICINE VISIT:    The patient location is: work  The chief complaint leading to consultation is: Urinary symptoms  Visit type: audiovisual  Total time spent with patient: 15 minutes  Each patient to whom he or she provides medical services by telemedicine is:  (1) informed of the relationship between the physician and patient and the respective role of any other health care provider with respect to management of the patient; and (2) notified that he or she may decline to receive medical services by telemedicine and may withdraw from such care at any time.    CC: Urinary symptoms    HPI:  Cristina Man is a 40 y.o. female patient  who requests a telemedicine visit to discuss her urinary symptoms.  For the past month, she describes having urinary urgency and frequency, but no dysuria.  She describes voiding about "15 times per day", generally multiple times, small amounts.  Denies fever or back pain.  Recent urinalysis and urine culture were both negative.  [facility]P [facility] .  Patient's last menstrual period was 2015.     20:  UA: negative,  Urine culture: negative    Past Medical History:   Diagnosis Date    Abnormal Pap smear of cervix     Allergic rhinitis due to allergen     Asthma at age 4       Past Surgical History:   Procedure Laterality Date    HEMORRHOID SURGERY  2009    HYSTERECTOMY           Diagnosis:  1. Overactive bladder    2. History of hysterectomy          PLAN:    Orders Placed This Encounter    tolterodine (DETROL LA) 4 MG 24 hr capsule       Patient was counseled today on her bladder symptoms and the various etiologies.  Recent UA and urine culture were both negative.  We discussed her over active bladder and treatment options.  She desires a trial of Detrol LA.  We discussed Detrol: r / b / correct usage / potential side effects, etc.  She will let us know her progress in several weeks.  If not significantly improved, she will need to see a urologist for evaluation. "     Follow-up with progress in 2-3 weeks.    Total time of visit: 15 minutes

## 2020-05-06 ENCOUNTER — TELEPHONE (OUTPATIENT)
Dept: PLASTIC SURGERY | Facility: CLINIC | Age: 40
End: 2020-05-06

## 2020-05-06 NOTE — TELEPHONE ENCOUNTER
"Spoke with pt and I told her that I wasn't sure as to when we would be getting the "ok" to operate . Pt stated that she received a letter from insurance company saying that her auth had been extended to 08/28/2020. I told pt that we would be contacting her as soon as we hear something . She verbalized understanding.                   ----- Message from Fausto Pederson sent at 5/6/2020  3:00 PM CDT -----  Contact: Pt  Called to speak w/ someone regarding rescheduling her surgery, requesting callback     Callback :[phone] (home)       "

## 2020-05-21 DIAGNOSIS — Z12.39 BREAST CANCER SCREENING: Primary | ICD-10-CM

## 2020-05-21 DIAGNOSIS — N62 MACROMASTIA: Primary | ICD-10-CM

## 2020-05-21 DIAGNOSIS — Z01.818 PRE-OP TESTING: Primary | ICD-10-CM

## 2020-05-21 NOTE — PROGRESS NOTES
PLASTIC & RECONSTRUCTIVE CONSULTATION NOTE    CC  No chief complaint on file.  Macromastias    Referring Provider-   PCP: Madelaine Hayden MD    HPI  History from patient, chart, referring provider  Cristina Man is a 40 y.o. female presenting with macromastia here for reduction    [facility]  Patient Active Problem List    Diagnosis Date Noted    Mild intermittent asthma with allergic rhinitis without complication 12/10/2019    Benign essential hypertension 12/10/2019    Prediabetes 12/10/2019    Bilateral impacted cerumen 06/10/2019    Migraine without aura and without status migrainosus, not intractable 06/10/2019    Class 2 obesity with body mass index (BMI) of 35.0 to 35.9 in adult 06/10/2019       PSH  Past Surgical History:   Procedure Laterality Date    HEMORRHOID SURGERY  2009    HYSTERECTOMY  2015       FH  Family History   Problem Relation Age of Onset    Hypertension Mother     Cirrhosis Father         alcoholic    Hypertension Brother     Heart failure Maternal Grandmother     Hypertension Maternal Grandmother     Diabetes Maternal Grandmother     Hypertension Maternal Grandfather         DM    Diabetes Maternal Grandfather     Lung cancer Paternal Grandmother     Hypertension Paternal Grandmother     Diabetes Paternal Grandmother     Breast cancer Neg Hx     Colon cancer Neg Hx     Ovarian cancer Neg Hx        MEDICATIONS  No outpatient medications have been marked as taking for the 5/21/20 encounter (Orders Only) with Marvin Levine MD.       ALLERGIES Review of patient's allergies indicates:  No Known Allergies    SOCIAL HISTORY  Tobacco:   Social History     Tobacco Use   Smoking Status Never Smoker   Smokeless Tobacco Never Used     EtOH:   Social History     Substance and Sexual Activity   Alcohol Use No       ROS  Pertinent otherwise negative except per HPI and ROS      Physical Exam  Physical Exam    LABS  Lab Results   Component Value Date    WBC 6.58 06/10/2020    HGB 12.7  06/10/2020    HCT 39.0 06/10/2020    MCV 92 06/10/2020     06/10/2020         Lab Results   Component Value Date     06/19/2019    K 3.9 06/19/2019     06/19/2019    CO2 25 06/19/2019     Lab Results   Component Value Date    ALBUMIN 3.9 06/19/2019     Lab Results   Component Value Date    CREATININE 0.8 06/19/2019     Lab Results   Component Value Date    HGBA1C 5.9 (H) 12/10/2019     [unfilled]    ASSESSMENT  Encounter Diagnoses   Name Primary?    Breast cancer screening Yes       ?  INFORMED CONSENT FOR SURGERY  Risks, benefits, outcomes, possible complications, perioperative restrictions, recovery, and potential disability were reviewed. Permission to obtain photographs before, during, and after surgery was also granted. Questions were answered. Written preoperative instructions and potential complications were given.    PLAN  To OR  Needs marking    Plastic & Reconstructive Surgery  [facility]  c/o Marvin Levine M.D.  [facility]  [address]  [address] [place] [address]    Work [phone]  Toll free [phone]  If no answer [phone]

## 2020-05-21 NOTE — H&P (VIEW-ONLY)
PLASTIC & RECONSTRUCTIVE CONSULTATION NOTE    CC  No chief complaint on file.  Macromastias    Referring Provider-   PCP: Madelaine Hayden MD    HPI  History from patient, chart, referring provider  Cristina Man is a 40 y.o. female presenting with macromastia here for reduction    [facility]  Patient Active Problem List    Diagnosis Date Noted    Mild intermittent asthma with allergic rhinitis without complication 12/10/2019    Benign essential hypertension 12/10/2019    Prediabetes 12/10/2019    Bilateral impacted cerumen 06/10/2019    Migraine without aura and without status migrainosus, not intractable 06/10/2019    Class 2 obesity with body mass index (BMI) of 35.0 to 35.9 in adult 06/10/2019       PSH  Past Surgical History:   Procedure Laterality Date    HEMORRHOID SURGERY  2009    HYSTERECTOMY  2015       FH  Family History   Problem Relation Age of Onset    Hypertension Mother     Cirrhosis Father         alcoholic    Hypertension Brother     Heart failure Maternal Grandmother     Hypertension Maternal Grandmother     Diabetes Maternal Grandmother     Hypertension Maternal Grandfather         DM    Diabetes Maternal Grandfather     Lung cancer Paternal Grandmother     Hypertension Paternal Grandmother     Diabetes Paternal Grandmother     Breast cancer Neg Hx     Colon cancer Neg Hx     Ovarian cancer Neg Hx        MEDICATIONS  No outpatient medications have been marked as taking for the 5/21/20 encounter (Orders Only) with Marvin Levine MD.       ALLERGIES Review of patient's allergies indicates:  No Known Allergies    SOCIAL HISTORY  Tobacco:   Social History     Tobacco Use   Smoking Status Never Smoker   Smokeless Tobacco Never Used     EtOH:   Social History     Substance and Sexual Activity   Alcohol Use No       ROS  Pertinent otherwise negative except per HPI and ROS      Physical Exam  Physical Exam    LABS  Lab Results   Component Value Date    WBC 6.58 06/10/2020    HGB 12.7  06/10/2020    HCT 39.0 06/10/2020    MCV 92 06/10/2020     06/10/2020         Lab Results   Component Value Date     06/19/2019    K 3.9 06/19/2019     06/19/2019    CO2 25 06/19/2019     Lab Results   Component Value Date    ALBUMIN 3.9 06/19/2019     Lab Results   Component Value Date    CREATININE 0.8 06/19/2019     Lab Results   Component Value Date    HGBA1C 5.9 (H) 12/10/2019     [unfilled]    ASSESSMENT  Encounter Diagnoses   Name Primary?    Breast cancer screening Yes       ?  INFORMED CONSENT FOR SURGERY  Risks, benefits, outcomes, possible complications, perioperative restrictions, recovery, and potential disability were reviewed. Permission to obtain photographs before, during, and after surgery was also granted. Questions were answered. Written preoperative instructions and potential complications were given.    PLAN  To OR  Needs marking    Plastic & Reconstructive Surgery  [facility]  c/o Marvin Levine M.D.  [facility]  [address]  [address] [place] [address]    Work [phone]  Toll free [phone]  If no answer [phone]

## 2020-05-26 ENCOUNTER — HOSPITAL ENCOUNTER (OUTPATIENT)
Dept: RADIOLOGY | Facility: HOSPITAL | Age: 40
Discharge: HOME | End: 2020-05-26
Attending: SURGERY
Payer: COMMERCIAL

## 2020-05-26 DIAGNOSIS — N62 MACROMASTIA: ICD-10-CM

## 2020-05-26 PROCEDURE — 77066 DX MAMMO INCL CAD BI: CPT | Mod: 26,,, | Performed by: RADIOLOGY

## 2020-05-26 PROCEDURE — 77066 DX MAMMO INCL CAD BI: CPT | Mod: TC,PO

## 2020-05-26 PROCEDURE — 77062 MAMMO DIGITAL DIAGNOSTIC BILAT WITH TOMOSYNTHESIS_CAD: ICD-10-PCS | Mod: 26,,, | Performed by: RADIOLOGY

## 2020-05-26 PROCEDURE — 77066 MAMMO DIGITAL DIAGNOSTIC BILAT WITH TOMOSYNTHESIS_CAD: ICD-10-PCS | Mod: 26,,, | Performed by: RADIOLOGY

## 2020-05-26 PROCEDURE — 77062 BREAST TOMOSYNTHESIS BI: CPT | Mod: 26,,, | Performed by: RADIOLOGY

## 2020-06-09 ENCOUNTER — ANESTHESIA EVENT (OUTPATIENT)
Dept: SURGERY | Facility: OTHER | Age: 40
End: 2020-06-09
Payer: COMMERCIAL

## 2020-06-10 ENCOUNTER — HOSPITAL ENCOUNTER (OUTPATIENT)
Dept: PREADMISSION TESTING | Facility: OTHER | Age: 40
Discharge: HOME | End: 2020-06-10
Attending: SURGERY
Payer: COMMERCIAL

## 2020-06-10 VITALS
DIASTOLIC BLOOD PRESSURE: 87 MMHG | HEART RATE: 78 BPM | TEMPERATURE: 98 F | OXYGEN SATURATION: 98 % | WEIGHT: 210 LBS | HEIGHT: 66 IN | SYSTOLIC BLOOD PRESSURE: 141 MMHG | BODY MASS INDEX: 33.75 KG/M2

## 2020-06-10 DIAGNOSIS — Z01.818 PRE-OP TESTING: ICD-10-CM

## 2020-06-10 DIAGNOSIS — N62 MACROMASTIA: ICD-10-CM

## 2020-06-10 LAB
BASOPHILS # BLD AUTO: 0.02 K/UL (ref 0–0.2)
BASOPHILS NFR BLD: 0.3 % (ref 0–1.9)
DIFFERENTIAL METHOD: ABNORMAL
EOSINOPHIL # BLD AUTO: 0.2 K/UL (ref 0–0.5)
EOSINOPHIL NFR BLD: 3.2 % (ref 0–8)
ERYTHROCYTE [DISTWIDTH] IN BLOOD BY AUTOMATED COUNT: 12.8 % (ref 11.5–14.5)
HCT VFR BLD AUTO: 39 % (ref 37–48.5)
HGB BLD-MCNC: 12.7 G/DL (ref 12–16)
IMM GRANULOCYTES # BLD AUTO: 0.01 K/UL (ref 0–0.04)
IMM GRANULOCYTES NFR BLD AUTO: 0.2 % (ref 0–0.5)
LYMPHOCYTES # BLD AUTO: 1.9 K/UL (ref 1–4.8)
LYMPHOCYTES NFR BLD: 28.9 % (ref 18–48)
MCH RBC QN AUTO: 30 PG (ref 27–31)
MCHC RBC AUTO-ENTMCNC: 32.6 G/DL (ref 32–36)
MCV RBC AUTO: 92 FL (ref 82–98)
MONOCYTES # BLD AUTO: 0.7 K/UL (ref 0.3–1)
MONOCYTES NFR BLD: 10.8 % (ref 4–15)
NEUTROPHILS # BLD AUTO: 3.7 K/UL (ref 1.8–7.7)
NEUTROPHILS NFR BLD: 56.6 % (ref 38–73)
NRBC BLD-RTO: 0 /100 WBC
PLATELET # BLD AUTO: 277 K/UL (ref 150–350)
PMV BLD AUTO: 8.5 FL (ref 9.2–12.9)
RBC # BLD AUTO: 4.23 M/UL (ref 4–5.4)
WBC # BLD AUTO: 6.58 K/UL (ref 3.9–12.7)

## 2020-06-10 PROCEDURE — 36415 COLL VENOUS BLD VENIPUNCTURE: CPT

## 2020-06-10 PROCEDURE — U0003 INFECTIOUS AGENT DETECTION BY NUCLEIC ACID (DNA OR RNA); SEVERE ACUTE RESPIRATORY SYNDROME CORONAVIRUS 2 (SARS-COV-2) (CORONAVIRUS DISEASE [COVID-19]), AMPLIFIED PROBE TECHNIQUE, MAKING USE OF HIGH THROUGHPUT TECHNOLOGIES AS DESCRIBED BY CMS-2020-01-R: HCPCS

## 2020-06-10 PROCEDURE — 85025 COMPLETE CBC W/AUTO DIFF WBC: CPT

## 2020-06-10 RX ORDER — PREGABALIN 50 MG/1
50 CAPSULE ORAL
Status: CANCELLED | OUTPATIENT
Start: 2020-06-10 | End: 2020-06-10

## 2020-06-10 RX ORDER — CELECOXIB 200 MG/1
400 CAPSULE ORAL
Status: CANCELLED | OUTPATIENT
Start: 2020-06-10 | End: 2020-06-10

## 2020-06-10 RX ORDER — SODIUM CHLORIDE, SODIUM LACTATE, POTASSIUM CHLORIDE, CALCIUM CHLORIDE 600; 310; 30; 20 MG/100ML; MG/100ML; MG/100ML; MG/100ML
INJECTION, SOLUTION INTRAVENOUS CONTINUOUS
Status: CANCELLED | OUTPATIENT
Start: 2020-06-10

## 2020-06-10 RX ORDER — ALBUTEROL SULFATE 0.83 MG/ML
2.5 SOLUTION RESPIRATORY (INHALATION)
Status: CANCELLED | OUTPATIENT
Start: 2020-06-10 | End: 2020-06-10

## 2020-06-10 RX ORDER — ACETAMINOPHEN 500 MG
1000 TABLET ORAL
Status: CANCELLED | OUTPATIENT
Start: 2020-06-10 | End: 2020-06-10

## 2020-06-10 RX ORDER — LIDOCAINE HYDROCHLORIDE 10 MG/ML
0.5 INJECTION, SOLUTION EPIDURAL; INFILTRATION; INTRACAUDAL; PERINEURAL ONCE
Status: CANCELLED | OUTPATIENT
Start: 2020-06-10 | End: 2020-06-10

## 2020-06-10 NOTE — DISCHARGE INSTRUCTIONS
Information to Prepare you for your Surgery    PRE-ADMIT TESTING -  [phone]    [address]  [facility]          Your surgery has been scheduled at [facility]. We are pleased to have the opportunity to serve you. For Further Information please call [phone].    On the day of surgery please report to the Information Desk on the 1st floor.    · CONTACT YOUR PHYSICIAN'S OFFICE THE DAY PRIOR TO YOUR SURGERY TO OBTAIN YOUR ARRIVAL TIME.     · The evening before surgery do not eat anything after 9 p.m. ( this includes hard candy, chewing gum and mints).  You may only have GATORADE, POWERADE AND WATER  from 9 p.m. until you leave your home.   DO NOT DRINK ANY LIQUIDS ON THE WAY TO THE HOSPITAL.      SPECIAL MEDICATION INSTRUCTIONS: TAKE medications checked off by the Anesthesiologist on your Medication List.    Angiogram Patients: Take medications as instructed by your physician, including aspirin.     Surgery Patients:    If you take ASPIRIN - Your PHYSICIAN/SURGEON will need to inform you IF/OR when you need to stop taking aspirin prior to your surgery.     Do Not take any medications containing IBUPROFEN.  Do Not Wear any make-up or dark nail polish   (especially eye make-up) to surgery. If you come to surgery with makeup on you will be required to remove the makeup or nail polish.    Do not shave your surgical area at least 5 days prior to your surgery. The surgical prep will be performed at the hospital according to Infection Control regulations.    Leave all valuables at home.   Do Not wear any jewelry or watches, including any metal in body piercings. Jewelry must be removed prior to coming to the hospital.  There is a possibility that rings that are unable to be removed may be cut off if they are on the surgical extremity.    Contact Lens must be removed before surgery. Either do not wear the contact lens or bring a case and solution for  storage.  Please bring a container for eyeglasses or dentures as required.  Bring any paperwork your physician has provided, such as consent forms,  history and physicals, doctor's orders, etc.   Bring comfortable clothes that are loose fitting to wear upon discharge. Take into consideration the type of surgery being performed.  Maintain your diet as advised per your physician the day prior to surgery.      Adequate rest the night before surgery is advised.   Park in the Parking lot behind the hospital or in the [facility] [place] [facility] across the street from the parking lot. Parking is complimentary.  If you will be discharged the same day as your procedure, please arrange for a responsible adult to drive you home or to accompany you if traveling by taxi.   YOU WILL NOT BE PERMITTED TO DRIVE OR TO LEAVE THE HOSPITAL ALONE AFTER SURGERY.   If you are being discharged the same day, it is strongly recommended that you arrange for someone to remain with you for the first 24 hrs following your surgery.    The Surgeon will speak to your family/visitor after your surgery regarding the outcome of your surgery and post op care.  The Surgeon may speak to you after your surgery, but there is a possibility you may not remember the details.  Please check with your family members regarding the conversation with the Surgeon.    We strongly recommend whoever is bringing you home be present for discharge instructions.  This will ensure a thorough understanding for your post op home care.    ALL CHILDREN MUST ALWAYS BE ACCOMPANIED BY AN ADULT.    Visitors-Refer to current Visitor policy handouts.    Thank you for your cooperation.  The Staff of [facility].                Bathing Instructions with Hibiclens     Shower the evening before and morning of your procedure with Hibiclens:   Wash your face with water and your regular face wash/soap   Apply Hibiclens directly on your skin or on a wet washcloth and wash  gently. When showering: Move away from the shower stream when applying Hibiclens to avoid rinsing off too soon.   Rinse thoroughly with warm water   Do not dilute Hibiclens         Dry off as usual, do not use any deodorant, powder, body lotions, perfume, after shave or cologne.

## 2020-06-10 NOTE — ANESTHESIA PREPROCEDURE EVALUATION
06/10/2020  Cristina Man is a 40 y.o., female.    Anesthesia Evaluation    I have reviewed the Patient Summary Reports.    I have reviewed the Nursing Notes.    I have reviewed the Medications.     Review of Systems  Anesthesia Hx:  No problems with previous Anesthesia  History of prior surgery of interest to airway management or planning: Previous anesthesia: General [facility] 2015 [facility] with general anesthesia.  Denies Family Hx of Anesthesia complications.   Denies Personal Hx of Anesthesia complications.   Social:  Non-Smoker    Hematology/Oncology:  Hematology Normal   Oncology Normal     EENT/Dental:   chronic allergic rhinitis   Cardiovascular:   Hypertension    Pulmonary:   Asthma mild    Renal/:  Renal/ Normal     Hepatic/GI:  Hepatic/GI Normal    Musculoskeletal:  Musculoskeletal Normal    Neurological:   Headaches    Endocrine:  Endocrine Normal    Dermatological:  Skin Normal    Psych:  Psychiatric Normal           Physical Exam  General:  Obesity    Airway/Jaw/Neck:  Airway Findings: Mouth Opening: Normal Tongue: Normal  Mallampati: II      Dental:  Dental Findings: Upper front caps        Mental Status:  Mental Status Findings:  Cooperative, Alert and Oriented         Anesthesia Plan  Type of Anesthesia, risks & benefits discussed:  Anesthesia Type:  general  Patient's Preference:   Intra-op Monitoring Plan: standard ASA monitors  Intra-op Monitoring Plan Comments:   Post Op Pain Control Plan: per primary service following discharge from PACU and multimodal analgesia  Post Op Pain Control Plan Comments:   Induction:   IV  Beta Blocker:         Informed Consent: Patient understands risks and agrees with Anesthesia plan.  Questions answered. Anesthesia consent signed with patient.  ASA Score: 2     Day of Surgery Review of History & Physical:    H&P update referred to the surgeon.     Anesthesia  Plan Notes: CBC today    HCT 39        Ready For Surgery From Anesthesia Perspective.

## 2020-06-11 ENCOUNTER — TELEPHONE (OUTPATIENT)
Dept: SURGERY | Facility: CLINIC | Age: 40
End: 2020-06-11

## 2020-06-11 LAB — SARS-COV-2 RNA RESP QL NAA+PROBE: NOT DETECTED

## 2020-06-11 NOTE — TELEPHONE ENCOUNTER
The patient location is: home  The chief complaint leading to consultation is: breast reduction    Visit type: audio only    Face to Face time with patient: 20    20 minutes of total time spent on the encounter, which includes face to face time and non-face to face time preparing to see the patient (eg, review of tests), Obtaining and/or reviewing separately obtained history, Documenting clinical information in the electronic or other health record, Independently interpreting results (not separately reported) and communicating results to the patient/family/caregiver, or Care coordination (not separately reported).         Each patient to whom he or she provides medical services by telemedicine is:  (1) informed of the relationship between the physician and patient and the respective role of any other health care provider with respect to management of the patient; and (2) notified that he or she may decline to receive medical services by telemedicine and may withdraw from such care at any time.    Notes:     No interval changes from last hp.  See below.    History & Physical  Plastic and Reconstructive Surgery  Breast Reduction Consult    SUBJECTIVE:   Chief complaint: large breasts    History of Present Illness:  Patient is a 40 y.o. female presents with symptomatic bilateral large pendulous  breasts.     Past Medical History:   Diagnosis Date    Abnormal Pap smear of cervix     Allergic rhinitis due to allergen     Asthma at age 4       Past Surgical History:   Procedure Laterality Date    HEMORRHOID SURGERY  2009    HYSTERECTOMY  2015       Family History   Problem Relation Age of Onset    Hypertension Mother     Cirrhosis Father         alcoholic    Hypertension Brother     Heart failure Maternal Grandmother     Hypertension Maternal Grandmother     Diabetes Maternal Grandmother     Hypertension Maternal Grandfather         DM    Diabetes Maternal Grandfather     Lung cancer Paternal Grandmother      Hypertension Paternal Grandmother     Diabetes Paternal Grandmother     Breast cancer Neg Hx     Colon cancer Neg Hx     Ovarian cancer Neg Hx        Social History     Socioeconomic History    Marital status:      Spouse name: Not on file    Number of children: 2    Years of education: Not on file    Highest education level: Not on file   Occupational History    Occupation:       Employer: [employer]   Social Needs    Financial resource strain: Not on file    Food insecurity:     Worry: Not on file     Inability: Not on file    Transportation needs:     Medical: Not on file     Non-medical: Not on file   Tobacco Use    Smoking status: Never Smoker    Smokeless tobacco: Never Used   Substance and Sexual Activity    Alcohol use: No    Drug use: No    Sexual activity: Yes     Partners: Male     Birth control/protection: None   Lifestyle    Physical activity:     Days per week: Not on file     Minutes per session: Not on file    Stress: Not on file   Relationships    Social connections:     Talks on phone: Not on file     Gets together: Not on file     Attends Temple service: Not on file     Active member of club or organization: Not on file     Attends meetings of clubs or organizations: Not on file     Relationship status: Not on file   Other Topics Concern    Not on file   Social History Narrative    Not on file       Current Outpatient Medications   Medication Sig Dispense Refill    albuterol (PROVENTIL/VENTOLIN HFA) 90 mcg/actuation inhaler Inhale 2 puffs into the lungs every 6 (six) hours as needed. Rescue 18 g 3    amLODIPine (NORVASC) 5 MG tablet Take 1 tablet (5 mg total) by mouth once daily. 90 tablet 3    azelastine (ASTELIN) 137 mcg (0.1 %) nasal spray 1 spray (137 mcg total) by Nasal route 2 (two) times daily. 30 mL 0    budesonide-formoterol 80-4.5 mcg (SYMBICORT) 80-4.5 mcg/actuation HFAA Inhale 2 puffs into the lungs 2  (two) times daily. Controller 10.2 g 5    CETIRIZINE HCL (ZYRTEC ORAL) Take by mouth.      fluticasone propionate (FLONASE) 50 mcg/actuation nasal spray 1 spray (50 mcg total) by Each Nare route once daily. 1 Bottle 0    tolterodine (DETROL LA) 4 MG 24 hr capsule Take 1 capsule (4 mg total) by mouth once daily. 30 capsule 2     No current facility-administered medications for this visit.        Review of patient's allergies indicates:  No Known Allergies      Review of Systems:    Review of Systems   HENT: Positive for neck pain.    Musculoskeletal: Positive for back pain.   Neurological: Positive for headaches. dizziness    Pertinent 12 point ROS negative except as listed above      OBJECTIVE:     Physical Exam:    Physical Exam   Refer to physical exam from last encounter.  Reviewed photos    Labs personally reviewed  Lab Results   Component Value Date    WBC 6.58 06/10/2020    HGB 12.7 06/10/2020    HCT 39.0 06/10/2020    MCV 92 06/10/2020     06/10/2020     CMP  BMP  Lab Results   Component Value Date     06/19/2019    K 3.9 06/19/2019     06/19/2019    CO2 25 06/19/2019    BUN 10 06/19/2019    CREATININE 0.8 06/19/2019    CALCIUM 9.0 06/19/2019    ANIONGAP 10 06/19/2019    ESTGFRAFRICA >60 06/19/2019    EGFRNONAA >60 06/19/2019     Lab Results   Component Value Date    ALT 18 06/19/2019    AST 15 06/19/2019    ALKPHOS 91 06/19/2019    BILITOT 0.3 06/19/2019     No results found for: INR, PROTIME    Last MMG   Mammo Digital Diagnostic Bilat w/ Norman     History:  Patient is 40 y.o. and is seen for annual mammogram.     Films Compared:  Compared to: 06/19/2019 Mammo Digital Screening Bilat w/ Norman     Findings:  This procedure was performed using tomosynthesis. Computer-aided detection was utilized in the interpretation of this examination.     The breasts are heterogeneously dense, which may obscure small masses. There is no evidence of suspicious masses, microcalcifications or architectural  distortion. Magnification views were performed of the lateral right breast on cc view only for possible calcifications.  3 round calcifications are noted which do not appear grouped and are likely superimposed, and have a benign appearance.      Impression:  No mammographic evidence of malignancy.     BI-RADS Category 1: Negative     Recommendation:  Routine screening mammogram in 1 year is recommended.     Your estimated lifetime risk of breast cancer (to age 85) based on Tyrer-Cuzick risk assessment model is Tyrer-Cuzick: 11.05 %. According to the American Cancer Society, patients with a lifetime breast cancer risk of 20% or higher might benefit from supplemental screening tests.    ASSESSMENT/PLAN:     1.Symptomatic Bilateral Macromastia  2. Chronic neck pain  3. Chronic back pain  4. Chronic breast rashes    PLAN:    -Ok for OR    Plastic & Reconstructive Surgery  [facility]  c/o Marvin Levine M.D.  [facility]  [address]  [address] [place] [address]    Work [phone]  Toll free [phone]  If no answer [phone]

## 2020-06-12 ENCOUNTER — ANESTHESIA (OUTPATIENT)
Dept: SURGERY | Facility: OTHER | Age: 40
End: 2020-06-12
Payer: COMMERCIAL

## 2020-06-12 ENCOUNTER — HOSPITAL ENCOUNTER (OUTPATIENT)
Facility: OTHER | Age: 40
Discharge: HOME | End: 2020-06-12
Attending: SURGERY | Admitting: SURGERY
Payer: COMMERCIAL

## 2020-06-12 VITALS
TEMPERATURE: 98 F | HEIGHT: 66 IN | SYSTOLIC BLOOD PRESSURE: 116 MMHG | OXYGEN SATURATION: 97 % | DIASTOLIC BLOOD PRESSURE: 68 MMHG | BODY MASS INDEX: 33.75 KG/M2 | HEART RATE: 88 BPM | RESPIRATION RATE: 16 BRPM | WEIGHT: 210 LBS

## 2020-06-12 DIAGNOSIS — N62 MACROMASTIA: Primary | ICD-10-CM

## 2020-06-12 LAB
ANION GAP SERPL CALC-SCNC: 8 MMOL/L (ref 8–16)
BUN SERPL-MCNC: 10 MG/DL (ref 6–20)
CALCIUM SERPL-MCNC: 9.3 MG/DL (ref 8.7–10.5)
CHLORIDE SERPL-SCNC: 106 MMOL/L (ref 95–110)
CO2 SERPL-SCNC: 24 MMOL/L (ref 23–29)
CREAT SERPL-MCNC: 0.8 MG/DL (ref 0.5–1.4)
EST. GFR  (AFRICAN AMERICAN): >60 ML/MIN/1.73 M^2
EST. GFR  (NON AFRICAN AMERICAN): >60 ML/MIN/1.73 M^2
GLUCOSE SERPL-MCNC: 116 MG/DL (ref 70–110)
POTASSIUM SERPL-SCNC: 3.7 MMOL/L (ref 3.5–5.1)
SODIUM SERPL-SCNC: 138 MMOL/L (ref 136–145)

## 2020-06-12 PROCEDURE — 71000016 HC POSTOP RECOV ADDL HR: Performed by: SURGERY

## 2020-06-12 PROCEDURE — 36000707: Performed by: SURGERY

## 2020-06-12 PROCEDURE — 63600175 PHARM REV CODE 636 W HCPCS: Performed by: SURGERY

## 2020-06-12 PROCEDURE — 19318 PR REDUCTION OF LARGE BREAST: ICD-10-PCS | Mod: 50,,, | Performed by: SURGERY

## 2020-06-12 PROCEDURE — 88305 TISSUE EXAM BY PATHOLOGIST: CPT | Mod: 26,,, | Performed by: PATHOLOGY

## 2020-06-12 PROCEDURE — 63600175 PHARM REV CODE 636 W HCPCS: Performed by: NURSE ANESTHETIST, CERTIFIED REGISTERED

## 2020-06-12 PROCEDURE — 25000003 PHARM REV CODE 250: Performed by: NURSE ANESTHETIST, CERTIFIED REGISTERED

## 2020-06-12 PROCEDURE — 71000039 HC RECOVERY, EACH ADD'L HOUR: Performed by: SURGERY

## 2020-06-12 PROCEDURE — 36000706: Performed by: SURGERY

## 2020-06-12 PROCEDURE — 36415 COLL VENOUS BLD VENIPUNCTURE: CPT

## 2020-06-12 PROCEDURE — C1729 CATH, DRAINAGE: HCPCS | Performed by: SURGERY

## 2020-06-12 PROCEDURE — P9045 ALBUMIN (HUMAN), 5%, 250 ML: HCPCS | Mod: JG | Performed by: NURSE ANESTHETIST, CERTIFIED REGISTERED

## 2020-06-12 PROCEDURE — 25000003 PHARM REV CODE 250: Performed by: ANESTHESIOLOGY

## 2020-06-12 PROCEDURE — 71000033 HC RECOVERY, INTIAL HOUR: Performed by: SURGERY

## 2020-06-12 PROCEDURE — C9290 INJ, BUPIVACAINE LIPOSOME: HCPCS | Performed by: SURGERY

## 2020-06-12 PROCEDURE — 63600175 PHARM REV CODE 636 W HCPCS: Performed by: ANESTHESIOLOGY

## 2020-06-12 PROCEDURE — 19318 BREAST REDUCTION: CPT | Mod: 50,,, | Performed by: SURGERY

## 2020-06-12 PROCEDURE — 88305 TISSUE EXAM BY PATHOLOGIST: ICD-10-PCS | Mod: 26,,, | Performed by: PATHOLOGY

## 2020-06-12 PROCEDURE — 80048 BASIC METABOLIC PNL TOTAL CA: CPT

## 2020-06-12 PROCEDURE — 94760 N-INVAS EAR/PLS OXIMETRY 1: CPT

## 2020-06-12 PROCEDURE — 25000242 PHARM REV CODE 250 ALT 637 W/ HCPCS: Performed by: ANESTHESIOLOGY

## 2020-06-12 PROCEDURE — 37000009 HC ANESTHESIA EA ADD 15 MINS: Performed by: SURGERY

## 2020-06-12 PROCEDURE — 27201423 OPTIME MED/SURG SUP & DEVICES STERILE SUPPLY: Performed by: SURGERY

## 2020-06-12 PROCEDURE — 37000008 HC ANESTHESIA 1ST 15 MINUTES: Performed by: SURGERY

## 2020-06-12 PROCEDURE — 71000015 HC POSTOP RECOV 1ST HR: Performed by: SURGERY

## 2020-06-12 PROCEDURE — 88305 TISSUE EXAM BY PATHOLOGIST: CPT | Mod: 59 | Performed by: PATHOLOGY

## 2020-06-12 PROCEDURE — 25000003 PHARM REV CODE 250: Performed by: SURGERY

## 2020-06-12 PROCEDURE — 94640 AIRWAY INHALATION TREATMENT: CPT

## 2020-06-12 RX ORDER — SODIUM CHLORIDE 0.9 % (FLUSH) 0.9 %
10 SYRINGE (ML) INJECTION
Status: DISCONTINUED | OUTPATIENT
Start: 2020-06-12 | End: 2020-06-12 | Stop reason: HOSPADM

## 2020-06-12 RX ORDER — CEFAZOLIN SODIUM 2 G/50ML
2 SOLUTION INTRAVENOUS
Status: DISCONTINUED | OUTPATIENT
Start: 2020-06-12 | End: 2020-06-12 | Stop reason: HOSPADM

## 2020-06-12 RX ORDER — FENTANYL CITRATE 50 UG/ML
INJECTION, SOLUTION INTRAMUSCULAR; INTRAVENOUS
Status: DISCONTINUED | OUTPATIENT
Start: 2020-06-12 | End: 2020-06-12

## 2020-06-12 RX ORDER — ENOXAPARIN SODIUM 100 MG/ML
40 INJECTION SUBCUTANEOUS DAILY
Qty: 5.6 ML | Refills: 0 | Status: SHIPPED | OUTPATIENT
Start: 2020-06-12 | End: 2020-06-26

## 2020-06-12 RX ORDER — HYDROCODONE BITARTRATE AND ACETAMINOPHEN 10; 325 MG/1; MG/1
1 TABLET ORAL EVERY 4 HOURS PRN
Status: DISCONTINUED | OUTPATIENT
Start: 2020-06-12 | End: 2020-06-12 | Stop reason: HOSPADM

## 2020-06-12 RX ORDER — SODIUM CHLORIDE, SODIUM LACTATE, POTASSIUM CHLORIDE, CALCIUM CHLORIDE 600; 310; 30; 20 MG/100ML; MG/100ML; MG/100ML; MG/100ML
INJECTION, SOLUTION INTRAVENOUS CONTINUOUS
Status: DISCONTINUED | OUTPATIENT
Start: 2020-06-12 | End: 2020-06-12 | Stop reason: HOSPADM

## 2020-06-12 RX ORDER — MUPIROCIN 20 MG/G
OINTMENT TOPICAL 2 TIMES DAILY
Status: DISCONTINUED | OUTPATIENT
Start: 2020-06-12 | End: 2020-06-12 | Stop reason: HOSPADM

## 2020-06-12 RX ORDER — CELECOXIB 200 MG/1
400 CAPSULE ORAL
Status: COMPLETED | OUTPATIENT
Start: 2020-06-12 | End: 2020-06-12

## 2020-06-12 RX ORDER — NEOSTIGMINE METHYLSULFATE 1 MG/ML
INJECTION, SOLUTION INTRAVENOUS
Status: DISCONTINUED | OUTPATIENT
Start: 2020-06-12 | End: 2020-06-12

## 2020-06-12 RX ORDER — ALBUTEROL SULFATE 0.83 MG/ML
2.5 SOLUTION RESPIRATORY (INHALATION)
Status: COMPLETED | OUTPATIENT
Start: 2020-06-12 | End: 2020-06-12

## 2020-06-12 RX ORDER — LIDOCAINE HYDROCHLORIDE 10 MG/ML
1 INJECTION, SOLUTION EPIDURAL; INFILTRATION; INTRACAUDAL; PERINEURAL ONCE
Status: DISCONTINUED | OUTPATIENT
Start: 2020-06-12 | End: 2020-06-12 | Stop reason: HOSPADM

## 2020-06-12 RX ORDER — MUPIROCIN 20 MG/G
OINTMENT TOPICAL
Status: DISCONTINUED | OUTPATIENT
Start: 2020-06-12 | End: 2020-06-12 | Stop reason: HOSPADM

## 2020-06-12 RX ORDER — PHENYLEPHRINE HYDROCHLORIDE 10 MG/ML
INJECTION INTRAVENOUS
Status: DISCONTINUED | OUTPATIENT
Start: 2020-06-12 | End: 2020-06-12

## 2020-06-12 RX ORDER — LIDOCAINE HCL/PF 100 MG/5ML
SYRINGE (ML) INTRAVENOUS
Status: DISCONTINUED | OUTPATIENT
Start: 2020-06-12 | End: 2020-06-12

## 2020-06-12 RX ORDER — OXYCODONE HYDROCHLORIDE 5 MG/1
5 TABLET ORAL
Status: DISCONTINUED | OUTPATIENT
Start: 2020-06-12 | End: 2020-06-12 | Stop reason: HOSPADM

## 2020-06-12 RX ORDER — PREGABALIN 50 MG/1
50 CAPSULE ORAL
Status: COMPLETED | OUTPATIENT
Start: 2020-06-12 | End: 2020-06-12

## 2020-06-12 RX ORDER — BACITRACIN ZINC 500 UNIT/G
OINTMENT (GRAM) TOPICAL
Status: DISCONTINUED | OUTPATIENT
Start: 2020-06-12 | End: 2020-06-12 | Stop reason: HOSPADM

## 2020-06-12 RX ORDER — ROCURONIUM BROMIDE 10 MG/ML
INJECTION, SOLUTION INTRAVENOUS
Status: DISCONTINUED | OUTPATIENT
Start: 2020-06-12 | End: 2020-06-12

## 2020-06-12 RX ORDER — POLYETHYLENE GLYCOL 3350 17 G/17G
17 POWDER, FOR SOLUTION ORAL DAILY
Qty: 238 G | Refills: 0 | Status: SHIPPED | OUTPATIENT
Start: 2020-06-12 | End: 2021-04-22

## 2020-06-12 RX ORDER — HYDROCODONE BITARTRATE AND ACETAMINOPHEN 5; 325 MG/1; MG/1
1 TABLET ORAL EVERY 6 HOURS PRN
Qty: 30 TABLET | Refills: 0 | Status: SHIPPED | OUTPATIENT
Start: 2020-06-12 | End: 2021-04-22

## 2020-06-12 RX ORDER — ACETAMINOPHEN 500 MG
1000 TABLET ORAL
Status: COMPLETED | OUTPATIENT
Start: 2020-06-12 | End: 2020-06-12

## 2020-06-12 RX ORDER — ONDANSETRON HYDROCHLORIDE 2 MG/ML
INJECTION, SOLUTION INTRAMUSCULAR; INTRAVENOUS
Status: DISCONTINUED | OUTPATIENT
Start: 2020-06-12 | End: 2020-06-12

## 2020-06-12 RX ORDER — ALBUMIN HUMAN 50 G/1000ML
SOLUTION INTRAVENOUS CONTINUOUS PRN
Status: DISCONTINUED | OUTPATIENT
Start: 2020-06-12 | End: 2020-06-12

## 2020-06-12 RX ORDER — HEPARIN SODIUM 5000 [USP'U]/ML
5000 INJECTION, SOLUTION INTRAVENOUS; SUBCUTANEOUS ONCE
Status: COMPLETED | OUTPATIENT
Start: 2020-06-12 | End: 2020-06-12

## 2020-06-12 RX ORDER — LIDOCAINE HYDROCHLORIDE 10 MG/ML
0.5 INJECTION, SOLUTION EPIDURAL; INFILTRATION; INTRACAUDAL; PERINEURAL ONCE
Status: DISCONTINUED | OUTPATIENT
Start: 2020-06-12 | End: 2020-06-12 | Stop reason: HOSPADM

## 2020-06-12 RX ORDER — SODIUM CHLORIDE 0.9 % (FLUSH) 0.9 %
3 SYRINGE (ML) INJECTION
Status: DISCONTINUED | OUTPATIENT
Start: 2020-06-12 | End: 2020-06-12 | Stop reason: HOSPADM

## 2020-06-12 RX ORDER — PROPOFOL 10 MG/ML
VIAL (ML) INTRAVENOUS
Status: DISCONTINUED | OUTPATIENT
Start: 2020-06-12 | End: 2020-06-12

## 2020-06-12 RX ORDER — MIDAZOLAM HYDROCHLORIDE 5 MG/ML
INJECTION INTRAMUSCULAR; INTRAVENOUS
Status: DISCONTINUED | OUTPATIENT
Start: 2020-06-12 | End: 2020-06-12

## 2020-06-12 RX ORDER — BACITRACIN 50000 [IU]/1
INJECTION, POWDER, FOR SOLUTION INTRAMUSCULAR
Status: DISCONTINUED | OUTPATIENT
Start: 2020-06-12 | End: 2020-06-12 | Stop reason: HOSPADM

## 2020-06-12 RX ORDER — MEPERIDINE HYDROCHLORIDE 25 MG/ML
12.5 INJECTION INTRAMUSCULAR; INTRAVENOUS; SUBCUTANEOUS ONCE AS NEEDED
Status: DISCONTINUED | OUTPATIENT
Start: 2020-06-12 | End: 2020-06-12 | Stop reason: HOSPADM

## 2020-06-12 RX ORDER — DEXAMETHASONE SODIUM PHOSPHATE 4 MG/ML
INJECTION, SOLUTION INTRA-ARTICULAR; INTRALESIONAL; INTRAMUSCULAR; INTRAVENOUS; SOFT TISSUE
Status: DISCONTINUED | OUTPATIENT
Start: 2020-06-12 | End: 2020-06-12

## 2020-06-12 RX ORDER — HYDROMORPHONE HYDROCHLORIDE 2 MG/ML
0.4 INJECTION, SOLUTION INTRAMUSCULAR; INTRAVENOUS; SUBCUTANEOUS EVERY 5 MIN PRN
Status: DISCONTINUED | OUTPATIENT
Start: 2020-06-12 | End: 2020-06-12 | Stop reason: HOSPADM

## 2020-06-12 RX ORDER — ONDANSETRON 2 MG/ML
4 INJECTION INTRAMUSCULAR; INTRAVENOUS DAILY PRN
Status: DISCONTINUED | OUTPATIENT
Start: 2020-06-12 | End: 2020-06-12 | Stop reason: HOSPADM

## 2020-06-12 RX ORDER — GLYCOPYRROLATE 0.2 MG/ML
INJECTION INTRAMUSCULAR; INTRAVENOUS
Status: DISCONTINUED | OUTPATIENT
Start: 2020-06-12 | End: 2020-06-12

## 2020-06-12 RX ORDER — HYDROCODONE BITARTRATE AND ACETAMINOPHEN 5; 325 MG/1; MG/1
1 TABLET ORAL EVERY 4 HOURS PRN
Status: DISCONTINUED | OUTPATIENT
Start: 2020-06-12 | End: 2020-06-12 | Stop reason: HOSPADM

## 2020-06-12 RX ADMIN — NEOSTIGMINE METHYLSULFATE 5 MG: 1 INJECTION INTRAVENOUS at 05:06

## 2020-06-12 RX ADMIN — HEPARIN SODIUM 5000 UNITS: 5000 INJECTION, SOLUTION INTRAVENOUS; SUBCUTANEOUS at 10:06

## 2020-06-12 RX ADMIN — ONDANSETRON 4 MG: 2 INJECTION, SOLUTION INTRAMUSCULAR; INTRAVENOUS at 02:06

## 2020-06-12 RX ADMIN — ALBUTEROL SULFATE 2.5 MG: 2.5 SOLUTION RESPIRATORY (INHALATION) at 10:06

## 2020-06-12 RX ADMIN — SODIUM CHLORIDE, SODIUM LACTATE, POTASSIUM CHLORIDE, AND CALCIUM CHLORIDE: 600; 310; 30; 20 INJECTION, SOLUTION INTRAVENOUS at 05:06

## 2020-06-12 RX ADMIN — ALBUMIN (HUMAN): 12.5 SOLUTION INTRAVENOUS at 03:06

## 2020-06-12 RX ADMIN — GLYCOPYRROLATE 0.8 MG: 0.2 INJECTION, SOLUTION INTRAMUSCULAR; INTRAVENOUS at 05:06

## 2020-06-12 RX ADMIN — HEPARIN SODIUM 5000 UNITS: 5000 INJECTION, SOLUTION INTRAVENOUS; SUBCUTANEOUS at 02:06

## 2020-06-12 RX ADMIN — DEXAMETHASONE SODIUM PHOSPHATE 8 MG: 4 INJECTION, SOLUTION INTRAMUSCULAR; INTRAVENOUS at 02:06

## 2020-06-12 RX ADMIN — HYDROMORPHONE HYDROCHLORIDE 0.4 MG: 2 INJECTION, SOLUTION INTRAMUSCULAR; INTRAVENOUS; SUBCUTANEOUS at 05:06

## 2020-06-12 RX ADMIN — ACETAMINOPHEN 1000 MG: 500 TABLET, FILM COATED ORAL at 10:06

## 2020-06-12 RX ADMIN — FENTANYL CITRATE 50 MCG: 50 INJECTION, SOLUTION INTRAMUSCULAR; INTRAVENOUS at 03:06

## 2020-06-12 RX ADMIN — MUPIROCIN: 20 OINTMENT TOPICAL at 10:06

## 2020-06-12 RX ADMIN — CELECOXIB 400 MG: 200 CAPSULE ORAL at 10:06

## 2020-06-12 RX ADMIN — PROPOFOL 160 MG: 10 INJECTION, EMULSION INTRAVENOUS at 02:06

## 2020-06-12 RX ADMIN — FENTANYL CITRATE 50 MCG: 50 INJECTION, SOLUTION INTRAMUSCULAR; INTRAVENOUS at 04:06

## 2020-06-12 RX ADMIN — PREGABALIN 50 MG: 50 CAPSULE ORAL at 10:06

## 2020-06-12 RX ADMIN — OXYCODONE HYDROCHLORIDE 5 MG: 5 TABLET ORAL at 05:06

## 2020-06-12 RX ADMIN — FENTANYL CITRATE 100 MCG: 50 INJECTION, SOLUTION INTRAMUSCULAR; INTRAVENOUS at 02:06

## 2020-06-12 RX ADMIN — MIDAZOLAM 2.5 MG: 5 INJECTION INTRAMUSCULAR; INTRAVENOUS at 01:06

## 2020-06-12 RX ADMIN — ONDANSETRON 4 MG: 2 INJECTION INTRAMUSCULAR; INTRAVENOUS at 08:06

## 2020-06-12 RX ADMIN — PHENYLEPHRINE HYDROCHLORIDE 100 MCG: 10 INJECTION INTRAVENOUS at 04:06

## 2020-06-12 RX ADMIN — SODIUM CHLORIDE, SODIUM LACTATE, POTASSIUM CHLORIDE, AND CALCIUM CHLORIDE: 600; 310; 30; 20 INJECTION, SOLUTION INTRAVENOUS at 01:06

## 2020-06-12 RX ADMIN — CEFAZOLIN SODIUM 2 G: 2 SOLUTION INTRAVENOUS at 02:06

## 2020-06-12 RX ADMIN — PHENYLEPHRINE HYDROCHLORIDE 200 MCG: 10 INJECTION INTRAVENOUS at 03:06

## 2020-06-12 RX ADMIN — PHENYLEPHRINE HYDROCHLORIDE 100 MCG: 10 INJECTION INTRAVENOUS at 03:06

## 2020-06-12 RX ADMIN — LIDOCAINE HYDROCHLORIDE 100 MG: 20 INJECTION, SOLUTION INTRAVENOUS at 02:06

## 2020-06-12 RX ADMIN — ROCURONIUM BROMIDE 50 MG: 10 INJECTION, SOLUTION INTRAVENOUS at 02:06

## 2020-06-12 RX ADMIN — FENTANYL CITRATE 50 MCG: 50 INJECTION, SOLUTION INTRAMUSCULAR; INTRAVENOUS at 02:06

## 2020-06-12 NOTE — DISCHARGE INSTRUCTIONS
AFTER VISIT INSTRUCTIONS     Name: Cristina Man  Medical Record Number: [id number]  Allergies: Review of patient's allergies indicates:  No Known Allergies        FOLLOW-UP:  Please call the office to confirm a follow up time.  I should see you on 6-17  to check your surgical sites and drains.  .    CONTACT NUMBERS:     [facility]  [address] [place] [address]  [phone]     Plastic & Reconstructive Surgery  Microsurgery  [facility]  c/o Marvin Levine M.D.  [facility]  [address]  [address] [place] [address]]  Work [phone]  Toll free [phone]     To schedule appointment:  For all life-threatening emergencies, please call 911  For all other concerns regarding your plastic surgery, you may call the office at: [phone], toll free [phone].       BATHING  No tub soaking, lotions of creams to surgical sites until cleared by your doctor.  Ok to shower post op day 1.  No scrubbing or soaking of incisions.  Pat wounds dry.  Do not remove the tapes over your surgical sites.  If the edges become , trim the loose ends.  The office staff can help you remove the tapes at your follow up visit.       WOUND CARE  Place bacitracin over the nipples three times per day  When you shower you can change the vaseline gauze/xeroform gauze over your nipples.  Wear the surgical bra for comfort, but you should not wear a bra with underwires. Record the drain outputs as instructed.Use your drain log to record the output from your drain, which you can use to keep track of each of your drains.   There are further instructions for drain care at the bottom of this page.  You may start scar massage at 2 weeks, once cleared by Dr. Levine, if you are healing appropriately.     SUTURES  Do not remove any sutures.  These will be removed in the office.     ACTIVITY  Encourage po intake  You may resume light activity (walking, usual  activities around the home).  Avoid heavy lifting, running, swimming, strenuous activity for at least 3 weeks.    Avoid long-distance travel for at least 3 weeks.  If you have long car rides, hydrate yourself well.  You can wear compressive stockings to avoid the blood in your legs from sitting still.  Perform ankle circles while awake to prevent stasis in your legs.       THINGS TO WATCH FOR:  If you notice new pain, redness, abnormal drainage, abnormal fluid collection, fever, or wound healing issues please notify your provider immediately.  If there are issues with your surgical sites, we would rather know about them early, so that an appropriate plan of action can be followed.  If notified in a timely manner, this kind of post op care should be coordinated by Dr. Levine rather than a surgeon or emergency person you are not familiar with.     If you are short of breath or have new leg pain and/or having difficulty breathing, please go to your nearest emergency room.       MEDICATIONS  sage pain medication as needed:  Tylenol (acetominophen) 650 mg every 6 hours is recommended for mild pain.  If you are taking a narcotic mixed with acetaminophen, wait at least 4 HOURS after taking other acetaminophen-containing preparations (ie. Tylenol)  DO NOT exceed 4 grams of Tylenol in a 24 h period  Continue your usual medications and vitamins  Wait     SCAR MANAGEMENT  Scars may take over 1 year to mature.  Some scars will remain pink, dark purple, and possibly raised for 6-9 months after surgery.  After one year, scars often become flatter, smoother, and may change color.  After removal of the tegaderm/tape, suture removal, or when glue was used, apply a thin layer of Aquaphor (available at any drugstore) or antibiotic ointment to the scars for another 2 weeks.     Begin silicone when scars smooth, generally starting about 6 weeks after surgery.  Please discuss this with Dr. Levine before proceeding.  Medical grade silicone  gel is available on companies' web sites or on amazon.com  Brands recommended:  - Scarfade (scarfade.com)  - NeuGel ( )  - Kelocote (kelocote.com)     Drainage Tube  Your doctor discharges you with a Elmo-Mcclelland drainage tube. These tubes are in place to help prevent fluid from collecting around your prosthesis.  It is important that fluid does not stay in the cavity, because it can cause healing difficulties or implant infection.  It helps drain and collect blood and body fluid after surgery. This can prevent swelling and reduces the risk for infection. The tube is held in place by a few stitches.      Drain Care  · Dont sleep on the same side as the tube.  · Secure the tube and bag inside your clothing with a safety pin. This helps keep the tube from being pulled out.  · Empty your drain at least three times a day.  Regularly strip the tubing from your drain stitch to the bulb to prevent clots from accumulating.  Empty it when you notice it is half full with fluid.  When it gets beyond half way full, the suction mechanism does not work as well and the fluid collections in your wounds.  Wash and dry your hands before emptying the drain.  How to use the STEFANI bulb:  ? Lift the opening on the drain.  ? Drain the fluid into a measuring cup.  ? Record the amount of fluid each time you empty the drain. Include the date and time it was emptied. Share this information with your doctor on your next visit.  ? Squeeze the bulb with your hands until you hear air coming out of the bulb if your doctor has instructed you to do so (sometimes the bulb is used as a reservoir without suction). Check with your doctor about specific drain instructions.  ? Close the opening.  · Change the dressing around the tube every day.  ? Wash your hands.  ? Remove the old bandage.  ? Wash your hands again.  ? Wet a cotton swab and clean the skin around the incision and tube site. Use normal saline solution (salt and water). Or, you can use warm,  soapy water.  ? Put a new bandage on the incision and tube site. Make the bandage large enough to cover the whole incision area.  ? Tape the bandage in place.  · Keep the bandage and tube site dry when you shower. Ask your healthcare provider about the best way to do this.  ? Stripping the tube helps keep blood clots from blocking the tube.   ? Hold the tubing where it leaves the skin, with one hand. This keeps it from pulling on the skin.  ? Pinch the tubing with the thumb and first finger of your other hand.  ? Slowly and firmly pull your thumb and first finger down the tubing. You may find it helpful to hold an alcohol swab between your fingers and the tube to lubricate the tubing.  ? If the pulling hurts or feels like its coming out of the skin, stop. Begin again more gently.     Follow-up care  Make a follow-up appointment as directed by our staff.     When to seek medical care  Call your healthcare provider right away if you have any of the following:  · New or increased pain around the tube  · Redness, swelling, or warmth around the incision or tube  · Drainage that is foul-smelling  · Vomiting  · Fever of 100.4°F (38°C)  · Fluid leaking around the tube  · Incision seems not to be healing  · Stitches become loose  · Tube falls out or breaks  · Drainage that changes from light pink to dark red  · Blood clots in the drainage bulb  · A sudden increase or decrease in the amount of drainage (over 30 mL)       Anesthesia: After Your Surgery  Youve just had surgery. During surgery, you received medication called anesthesia to keep you comfortable and pain-free. After surgery, you may experience some pain or nausea. This is common. Here are some tips for feeling better and recovering after surgery.    Going home  Your doctor or nurse will show you how to take care of yourself when you go home. He or she will also answer your questions. Have an adult family member or friend drive you home. For the first 24 hours  after your surgery:  · Do not drive or use heavy equipment.  · Do not make important decisions or sign legal documents.  · Avoid alcohol.  · Have someone stay with you, if needed. He or she can watch for problems and help keep you safe.  · Take your time getting up from a seated or lying position. You may experience dizziness for 24 hours  Be sure to keep all follow-up appointments with your doctor. And rest after your procedure for as long as your doctor tells you to.    Coping with pain  If you have pain after surgery, pain medication will help you feel better. Take it as directed, before pain becomes severe. Also, ask your doctor or pharmacist about other ways to control pain, such as with heat, ice, and relaxation. And follow any other instructions your surgeon or nurse gives you.    URINARY RETENTION  Should you experience a decrease in your urine output or are unable to urinate following surgery, this can be due to the medications given during surgery.  We recommend you going to the nearest Emergency Department.    Tips for taking pain medication  To get the best relief possible, remember these points:  · Pain medications can upset your stomach. Taking them with a little food may help.  · Most pain relievers taken by mouth need at least 20 to 30 minutes to take effect.  · Taking medication on a schedule can help you remember to take it. Try to time your medication so that you can take it before beginning an activity, such as dressing, walking, or sitting down for dinner.  · Constipation is a common side effect of pain medications. Contact your doctor before taking any medications like laxatives or stool softeners to help relieve constipation. Also ask about any dietary restrictions, because drinking lots of fluids and eating foods like fruits and vegetables that are high in fiber can also help. Remember, dont take laxatives unless your surgeon has prescribed them.  · Mixing alcohol and pain medication can  cause dizziness and slow your breathing. It can even be fatal. Dont drink alcohol while taking pain medication.  · Pain medication can slow your reflexes. Dont drive or operate machinery while taking pain medication.  If your health care provider tells you to take acetaminophen to help relieve your pain, ask him or her how much you are supposed to take each day. (Acetaminophen is the generic name for Tylenol and other brand-name pain relievers.) Acetaminophen or other pain relievers may interact with your prescription medicines or other over-the-counter (OTC) drugs. Some prescription medications contain acetaminophen along with other active ingredients. Using both prescription and OTC acetaminophen for pain can cause you to overdose. The FDA recommends that you read the labels on your OTC medications carefully. This will help you to clearly understand the list of active ingredients, dosing instructions, and any warnings. It may also help you avoid taking too much acetaminophen. If you have questions or don't understand the information, ask your pharmacist or health care provider to explain it to you before you take the OTC medication.    Managing nausea  Some people have an upset stomach after surgery. This is often due to anesthesia, pain, pain medications, or the stress of surgery. The following tips will help you manage nausea and get good nutrition as you recover. If you were on a special diet before surgery, ask your doctor if you should follow it during recovery. These tips may help:  · Dont push yourself to eat. Your body will tell you when to eat and how much.  · Start off with clear liquids and soup. They are easier to digest.  · Progress to semi-solid foods (mashed potatoes, applesauce, and gelatin) as you feel ready.  · Slowly move to solid foods. Dont eat fatty, rich, or spicy foods at first.  · Dont force yourself to have three large meals a day. Instead, eat smaller amounts more often.  · Take  pain medications with a small amount of solid food, such as crackers or toast to avoid nausea.      Call your surgeon if    · You feel too sleepy, dizzy, or groggy (medication may be too strong).  · You have side effects like nausea, vomiting, or skin changes (rash, itching, or hives).   © 5052-7311 [employer]. [address] [place] [address], [place] [address]. All rights reserved. This information is not intended as a substitute for professional medical care. Always follow your healthcare professional's instructions.    PLEASE FOLLOW ANY ADDITIONAL INSTRUCTIONS FROM DR. WRIGHT

## 2020-06-12 NOTE — ANESTHESIA POSTPROCEDURE EVALUATION
Anesthesia Post Evaluation    Patient: Cristina Man    Procedure(s) Performed: Procedure(s) (LRB):  MAMMOPLASTY, REDUCTION, BILATERAL (Bilateral)    Final Anesthesia Type: general    Patient location during evaluation: PACU  Patient participation: Yes- Able to Participate  Level of consciousness: awake and alert  Post-procedure vital signs: reviewed and stable  Pain management: adequate  Airway patency: patent  KANDACE mitigation strategies: Extubation while patient is awake  PONV status at discharge: No PONV  Anesthetic complications: no      Cardiovascular status: hemodynamically stable  Respiratory status: unassisted  Hydration status: euvolemic  Follow-up not needed.          Vitals Value Taken Time   /64 6/12/2020  5:55 PM   Temp 36.5 °C (97.7 °F) 6/12/2020  5:25 PM   Pulse 86 6/12/2020  6:06 PM   Resp 16 6/12/2020  5:40 PM   SpO2 99 % 6/12/2020  6:06 PM   Vitals shown include unvalidated device data.      No case tracking events are documented in the log.      Pain/Gianluca Score: Pain Rating Prior to Med Admin: 5 (6/12/2020  5:55 PM)  Pain Rating Post Med Admin: 5 (6/12/2020  5:50 PM)  Gianluca Score: 9 (6/12/2020  5:50 PM)

## 2020-06-12 NOTE — PROGRESS NOTES
AFTER VISIT INSTRUCTIONS    Name: Cristina Man  Medical Record Number: [id number]  Allergies: Review of patient's allergies indicates:  No Known Allergies      FOLLOW-UP:  Please call the office to confirm a follow up time.  I should see you on 6-17  to check your surgical sites and drains.  .    CONTACT NUMBERS:    [facility]  [address] [place] [address]  [phone]    Plastic & Reconstructive Surgery  Microsurgery  [facility]  c/o Marvin Levine M.D.  [facility]  Second [address]  [address] [facility] [place] [address]]  Work [phone]  Toll free [phone]    To schedule appointment:  For all life-threatening emergencies, please call 911  For all other concerns regarding your plastic surgery, you may call the office at: [phone], toll free [phone].      BATHING  No tub soaking, lotions of creams to surgical sites until cleared by your doctor.  Ok to shower post op day 1.  No scrubbing or soaking of incisions.  Pat wounds dry.  Do not remove the tapes over your surgical sites.  If the edges become , trim the loose ends.  The office staff can help you remove the tapes at your follow up visit.      WOUND CARE  Place bacitracin over the nipples three times per day  When you shower you can change the vaseline gauze/xeroform gauze over your nipples.  Wear the surgical bra for comfort, but you should not wear a bra with underwires. Record the drain outputs as instructed.Use your drain log to record the output from your drain, which you can use to keep track of each of your drains.   There are further instructions for drain care at the bottom of this page.  You may start scar massage at 2 weeks, once cleared by Dr. Levine, if you are healing appropriately.    SUTURES  Do not remove any sutures.  These will be removed in the office.    ACTIVITY  Encourage po intake  You may resume light activity (walking, usual activities around  the home).  Avoid heavy lifting, running, swimming, strenuous activity for at least 3 weeks.    Avoid long-distance travel for at least 3 weeks.  If you have long car rides, hydrate yourself well.  You can wear compressive stockings to avoid the blood in your legs from sitting still.  Perform ankle circles while awake to prevent stasis in your legs.      THINGS TO WATCH FOR:  If you notice new pain, redness, abnormal drainage, abnormal fluid collection, fever, or wound healing issues please notify your provider immediately.  If there are issues with your surgical sites, we would rather know about them early, so that an appropriate plan of action can be followed.  If notified in a timely manner, this kind of post op care should be coordinated by Dr. Levine rather than a surgeon or emergency person you are not familiar with.    If you are short of breath or have new leg pain and/or having difficulty breathing, please go to your nearest emergency room.      MEDICATIONS  sage pain medication as needed:  Tylenol (acetominophen) 650 mg every 6 hours is recommended for mild pain.  If you are taking a narcotic mixed with acetaminophen, wait at least 4 HOURS after taking other acetaminophen-containing preparations (ie. Tylenol)  DO NOT exceed 4 grams of Tylenol in a 24 h period  Continue your usual medications and vitamins  Wait    SCAR MANAGEMENT  Scars may take over 1 year to mature.  Some scars will remain pink, dark purple, and possibly raised for 6-9 months after surgery.  After one year, scars often become flatter, smoother, and may change color.  After removal of the tegaderm/tape, suture removal, or when glue was used, apply a thin layer of Aquaphor (available at any drugstore) or antibiotic ointment to the scars for another 2 weeks.    Begin silicone when scars smooth, generally starting about 6 weeks after surgery.  Please discuss this with Dr. Levine before proceeding.  Medical grade silicone gel is available on  companies' web sites or on amazon.com  Brands recommended:  - Scarfade (scarfade.com)  - NeuGel ( )  - Kelocote (kelocote.com)    Drainage Tube  Your doctor discharges you with a Elmo-Mcclelland drainage tube. These tubes are in place to help prevent fluid from collecting around your prosthesis.  It is important that fluid does not stay in the cavity, because it can cause healing difficulties or implant infection.  It helps drain and collect blood and body fluid after surgery. This can prevent swelling and reduces the risk for infection. The tube is held in place by a few stitches.     Drain Care  · Dont sleep on the same side as the tube.  · Secure the tube and bag inside your clothing with a safety pin. This helps keep the tube from being pulled out.  · Empty your drain at least three times a day.  Regularly strip the tubing from your drain stitch to the bulb to prevent clots from accumulating.  Empty it when you notice it is half full with fluid.  When it gets beyond half way full, the suction mechanism does not work as well and the fluid collections in your wounds.  Wash and dry your hands before emptying the drain.  How to use the STEFANI bulb:  ? Lift the opening on the drain.  ? Drain the fluid into a measuring cup.  ? Record the amount of fluid each time you empty the drain. Include the date and time it was emptied. Share this information with your doctor on your next visit.  ? Squeeze the bulb with your hands until you hear air coming out of the bulb if your doctor has instructed you to do so (sometimes the bulb is used as a reservoir without suction). Check with your doctor about specific drain instructions.  ? Close the opening.  · Change the dressing around the tube every day.  ? Wash your hands.  ? Remove the old bandage.  ? Wash your hands again.  ? Wet a cotton swab and clean the skin around the incision and tube site. Use normal saline solution (salt and water). Or, you can use warm, soapy water.  ? Put a  new bandage on the incision and tube site. Make the bandage large enough to cover the whole incision area.  ? Tape the bandage in place.  · Keep the bandage and tube site dry when you shower. Ask your healthcare provider about the best way to do this.  ? Stripping the tube helps keep blood clots from blocking the tube.   ? Hold the tubing where it leaves the skin, with one hand. This keeps it from pulling on the skin.  ? Pinch the tubing with the thumb and first finger of your other hand.  ? Slowly and firmly pull your thumb and first finger down the tubing. You may find it helpful to hold an alcohol swab between your fingers and the tube to lubricate the tubing.  ? If the pulling hurts or feels like its coming out of the skin, stop. Begin again more gently.    Follow-up care  Make a follow-up appointment as directed by our staff.     When to seek medical care  Call your healthcare provider right away if you have any of the following:  · New or increased pain around the tube  · Redness, swelling, or warmth around the incision or tube  · Drainage that is foul-smelling  · Vomiting  · Fever of 100.4°F (38°C)  · Fluid leaking around the tube  · Incision seems not to be healing  · Stitches become loose  · Tube falls out or breaks  · Drainage that changes from light pink to dark red  · Blood clots in the drainage bulb  · A sudden increase or decrease in the amount of drainage (over 30 mL)     Cristina's Drain Record     Date Emptied Time Emptied Amount in [place]                                                                                                                   Cristina's Drain Record     Date Emptied Time Emptied Amount in [place]

## 2020-06-12 NOTE — TRANSFER OF CARE
"Anesthesia Transfer of Care Note    Patient: Cristina Man    Procedure(s) Performed: Procedure(s) (LRB):  MAMMOPLASTY, REDUCTION, BILATERAL (Bilateral)    Patient location: PACU    Anesthesia Type: general    Transport from OR: Transported from OR on 2-3 L/min O2 by NC with adequate spontaneous ventilation    Post pain: adequate analgesia    Post assessment: no apparent anesthetic complications    Post vital signs: stable    Level of consciousness: awake    Nausea/Vomiting: no nausea/vomiting    Complications: none    Transfer of care protocol was followed      Last vitals:   Visit Vitals  /84 (BP Location: Right arm, Patient Position: Sitting)   Pulse 83   Temp 36.8 °C (98.2 °F) (Oral)   Resp 16   Ht 5' 6" (1.676 m)   Wt 95.3 kg (210 lb)   LMP 04/27/2015   SpO2 100%   Breastfeeding? No   BMI 33.89 kg/m²     "

## 2020-06-12 NOTE — DISCHARGE SUMMARY
[facility]  General Surgery  Discharge Summary      Patient Name: Cristina Man  MRN: [id number]  Admission Date: 6/12/2020  Hospital Length of Stay: 0 days  Discharge Date and Time:  06/12/2020 5:21 PM  Attending Physician: Marvin Levine MD   Discharging Provider: Jasen Cortes MD  Primary Care Provider: Madelaine Hayden MD    HPI:   History of symptomatic macromastia    Procedure(s) (LRB):  MAMMOPLASTY, REDUCTION, BILATERAL (Bilateral)      Indwelling Lines/Drains at time of discharge:   Lines/Drains/Airways     Drain                 Closed/Suction Drain 06/12/20 1618 Right Breast Bulb 15 Fr. less than 1 day         Closed/Suction Drain 06/12/20 1619 Left Breast Bulb 15 Fr. less than 1 day              Hospital Course: admitted for BBR.  Tolerated the procedure well and was discharged home in stable condition after recovery in pacu    Pending Diagnostic Studies:     Procedure Component Value Units Date/Time    Specimen to Pathology, Surgery Breast [[id number]] Collected:  06/12/20 1627    Order Status:  Sent Lab Status:  In process Updated:  06/12/20 1627        Final Active Diagnoses:    Diagnosis Date Noted POA    Macromastia [N62] 06/12/2020 Yes      Problems Resolved During this Admission:      Discharged Condition: good    Disposition: Home or Self Care    Follow Up:    Patient Instructions:   No discharge procedures on file.  Medications:  Reconciled Home Medications:      Medication List      START taking these medications    enoxaparin 40 mg/0.4 mL Syrg  Commonly known as:  LOVENOX  Inject 0.4 mLs (40 mg total) into the skin once daily. for 14 days. (Do not administer until ok'd by Dr. Levine)     HYDROcodone-acetaminophen 5-325 mg per tablet  Commonly known as:  NORCO  Take 1 tablet by mouth every 6 (six) hours as needed for Pain (severe breakthrough pain).     polyethylene glycol 17 gram/dose powder  Commonly known as:  GLYCOLAX  Take 17 g by mouth once daily.        CONTINUE  taking these medications    albuterol 90 mcg/actuation inhaler  Commonly known as:  PROVENTIL/VENTOLIN HFA  Inhale 2 puffs into the lungs every 6 (six) hours as needed. Rescue     amLODIPine 5 MG tablet  Commonly known as:  NORVASC  Take 1 tablet (5 mg total) by mouth once daily.     azelastine 137 mcg (0.1 %) nasal spray  Commonly known as:  ASTELIN  1 spray (137 mcg total) by Nasal route 2 (two) times daily.     budesonide-formoterol 80-4.5 mcg 80-4.5 mcg/actuation Hfaa  Commonly known as:  SYMBICORT  Inhale 2 puffs into the lungs 2 (two) times daily. Controller     fluticasone propionate 50 mcg/actuation nasal spray  Commonly known as:  FLONASE  1 spray (50 mcg total) by Each Nare route once daily.     tolterodine 4 MG 24 hr capsule  Commonly known as:  DETROL LA  Take 1 capsule (4 mg total) by mouth once daily.     ZYRTEC ORAL  Take by mouth.            Jasen Cortes MD  Plastic Surgery  [facility]

## 2020-06-12 NOTE — BRIEF OP NOTE
[facility]  Brief Operative Note    Surgery Date: 6/12/2020     Surgeon(s) and Role:     * Marvin Levine MD - Primary    Assisting Surgeon: None    Pre-op Diagnosis:  Macromastia [N62]    Post-op Diagnosis:  Post-Op Diagnosis Codes:     * Macromastia [N62]    Procedure(s) (LRB):  MAMMOPLASTY, REDUCTION, BILATERAL (Bilateral)    Anesthesia: General    Description of the findings of the procedure(s): 718g removed from right breast, 700g removed from left breast    Estimated Blood Loss: 100cc       Specimens:   Specimen (12h ago, onward)    None            Discharge Note    OUTCOME: Patient tolerated treatment/procedure well without complication and is now ready for discharge.    DISPOSITION: Home or Self Care    FINAL DIAGNOSIS:  <principal problem not specified>    FOLLOWUP: In clinic    DISCHARGE INSTRUCTIONS:  No discharge procedures on file.

## 2020-06-13 NOTE — PLAN OF CARE
Cristina Man has met all discharge criteria from Phase II. Vital Signs are stable, ambulating  without difficulty. Discharge instructions given, patient verbalized understanding. Discharged from facility via wheelchair in stable condition.

## 2020-06-14 NOTE — PROGRESS NOTES
Did post op phone check  Shw reports she can see through drain fluid.  It is clear  Advised to start lovenox tomorrow.    Also advised can start ibuprofen to wean from po narcotics on mon if drains stay thin

## 2020-06-15 ENCOUNTER — TELEPHONE (OUTPATIENT)
Dept: PLASTIC SURGERY | Facility: CLINIC | Age: 40
End: 2020-06-15

## 2020-06-17 ENCOUNTER — OFFICE VISIT (OUTPATIENT)
Dept: PLASTIC SURGERY | Facility: CLINIC | Age: 40
End: 2020-06-17
Payer: COMMERCIAL

## 2020-06-17 VITALS
HEART RATE: 88 BPM | SYSTOLIC BLOOD PRESSURE: 140 MMHG | BODY MASS INDEX: 33.73 KG/M2 | DIASTOLIC BLOOD PRESSURE: 79 MMHG | WEIGHT: 209 LBS

## 2020-06-17 DIAGNOSIS — N62 MACROMASTIA: Primary | ICD-10-CM

## 2020-06-17 PROCEDURE — 99024 POSTOP FOLLOW-UP VISIT: CPT | Mod: S$GLB,,, | Performed by: SURGERY

## 2020-06-17 PROCEDURE — 99024 PR POST-OP FOLLOW-UP VISIT: ICD-10-PCS | Mod: S$GLB,,, | Performed by: SURGERY

## 2020-06-18 LAB
FINAL PATHOLOGIC DIAGNOSIS: NORMAL
GROSS: NORMAL

## 2020-06-18 NOTE — PROGRESS NOTES
Patient seen and examined.  Doing well after breast reduction  All incisions healed, prineo tape in tact.  Nipple areola complexes viable  No evidence of fluid collection or cellulitis  I reviewed her drain outputs.  Left drain put out 15 per day for last 2 days, right drain 30 per day  I removed her left drain.    She will follow up next week to determine if right drain can be removed.  Her pain control is adequate.  All of her questions were answered.    Plastic & Reconstructive Surgery  [facility]  c/o Marvin Levine M.D.  [facility]  [address]  [address] [place] [address]    Work [phone]  Toll free [phone]  If no answer [phone]

## 2020-06-22 ENCOUNTER — OFFICE VISIT (OUTPATIENT)
Dept: PLASTIC SURGERY | Facility: CLINIC | Age: 40
End: 2020-06-22
Payer: COMMERCIAL

## 2020-06-22 VITALS — HEART RATE: 99 BPM | DIASTOLIC BLOOD PRESSURE: 84 MMHG | SYSTOLIC BLOOD PRESSURE: 175 MMHG

## 2020-06-22 DIAGNOSIS — N62 MACROMASTIA: Primary | ICD-10-CM

## 2020-06-22 PROCEDURE — 99999 PR PBB SHADOW E&M-EST. PATIENT-LVL III: ICD-10-PCS | Mod: PBBFAC,,, | Performed by: SURGERY

## 2020-06-22 PROCEDURE — 99999 PR PBB SHADOW E&M-EST. PATIENT-LVL III: CPT | Mod: PBBFAC,,, | Performed by: SURGERY

## 2020-06-22 PROCEDURE — 99024 POSTOP FOLLOW-UP VISIT: CPT | Mod: S$GLB,,, | Performed by: SURGERY

## 2020-06-22 PROCEDURE — 99024 PR POST-OP FOLLOW-UP VISIT: ICD-10-PCS | Mod: S$GLB,,, | Performed by: SURGERY

## 2020-06-22 NOTE — PROGRESS NOTES
Plastic Post Op    Denies chest pain, calf pain or shortness of breath  Removed right drain, output thin and less than 30 cc per day  She is ambulating, can dc lovenox.  Recommended she continue ankle rom exercises and compression  Her pain is well controlled, she is off narcotics, now on ibuprofen  No seroma or cellulitis  NAC viable, good symmetry    She can remove tapes on Fri in shower, advised her not to remove them if they are not adherent  Can discontinue lovenox  Follow up in 3 weeks or sooner.      Plastic & Reconstructive Surgery  [facility]  c/o Marvin Levine M.D.  [facility]  [address]  [address] [place] [address]    Work [phone]  Toll free [phone]  If no answer [phone]

## 2020-07-23 ENCOUNTER — TELEPHONE (OUTPATIENT)
Dept: PLASTIC SURGERY | Facility: CLINIC | Age: 40
End: 2020-07-23

## 2020-07-23 NOTE — TELEPHONE ENCOUNTER
Called pt because she reached out to me asking what she could get to relieve her itching. I told pt she could grab some OTC benadryl cream and apply a thin layer to the affected area . I told pt to let me know if that gave her relief. Pt verbalized understanding.

## 2021-04-05 ENCOUNTER — PATIENT MESSAGE (OUTPATIENT)
Dept: ADMINISTRATIVE | Facility: HOSPITAL | Age: 41
End: 2021-04-05

## 2021-04-15 ENCOUNTER — PATIENT MESSAGE (OUTPATIENT)
Dept: RESEARCH | Facility: HOSPITAL | Age: 41
End: 2021-04-15

## 2021-04-20 ENCOUNTER — PATIENT MESSAGE (OUTPATIENT)
Dept: ADMINISTRATIVE | Facility: HOSPITAL | Age: 41
End: 2021-04-20

## 2021-04-22 ENCOUNTER — OFFICE VISIT (OUTPATIENT)
Dept: INTERNAL MEDICINE | Facility: CLINIC | Age: 41
End: 2021-04-22
Payer: COMMERCIAL

## 2021-04-22 VITALS
DIASTOLIC BLOOD PRESSURE: 80 MMHG | HEIGHT: 66 IN | BODY MASS INDEX: 35.32 KG/M2 | WEIGHT: 219.81 LBS | SYSTOLIC BLOOD PRESSURE: 141 MMHG | HEART RATE: 88 BPM | OXYGEN SATURATION: 99 %

## 2021-04-22 DIAGNOSIS — I10 BENIGN ESSENTIAL HYPERTENSION: ICD-10-CM

## 2021-04-22 DIAGNOSIS — Z00.00 VISIT FOR ANNUAL HEALTH EXAMINATION: Primary | ICD-10-CM

## 2021-04-22 DIAGNOSIS — M79.10 MYALGIA: ICD-10-CM

## 2021-04-22 DIAGNOSIS — J45.20 MILD INTERMITTENT ASTHMA WITH ALLERGIC RHINITIS WITHOUT COMPLICATION: ICD-10-CM

## 2021-04-22 DIAGNOSIS — G43.009 MIGRAINE WITHOUT AURA AND WITHOUT STATUS MIGRAINOSUS, NOT INTRACTABLE: ICD-10-CM

## 2021-04-22 DIAGNOSIS — Z12.31 SCREENING MAMMOGRAM, ENCOUNTER FOR: ICD-10-CM

## 2021-04-22 DIAGNOSIS — Z11.59 NEED FOR HEPATITIS C SCREENING TEST: ICD-10-CM

## 2021-04-22 DIAGNOSIS — J34.3 NASAL TURBINATE HYPERTROPHY: ICD-10-CM

## 2021-04-22 DIAGNOSIS — R73.03 PREDIABETES: ICD-10-CM

## 2021-04-22 DIAGNOSIS — R35.0 URINARY FREQUENCY: ICD-10-CM

## 2021-04-22 DIAGNOSIS — N32.81 OVERACTIVE BLADDER: ICD-10-CM

## 2021-04-22 PROCEDURE — 99396 PREV VISIT EST AGE 40-64: CPT | Mod: S$GLB,,, | Performed by: INTERNAL MEDICINE

## 2021-04-22 PROCEDURE — 99999 PR PBB SHADOW E&M-EST. PATIENT-LVL IV: CPT | Mod: PBBFAC,,, | Performed by: INTERNAL MEDICINE

## 2021-04-22 PROCEDURE — 99999 PR PBB SHADOW E&M-EST. PATIENT-LVL IV: ICD-10-PCS | Mod: PBBFAC,,, | Performed by: INTERNAL MEDICINE

## 2021-04-22 PROCEDURE — 99396 PR PREVENTIVE VISIT,EST,40-64: ICD-10-PCS | Mod: S$GLB,,, | Performed by: INTERNAL MEDICINE

## 2021-04-22 RX ORDER — AMLODIPINE BESYLATE 5 MG/1
5 TABLET ORAL DAILY
Qty: 90 TABLET | Refills: 3 | Status: SHIPPED | OUTPATIENT
Start: 2021-04-22 | End: 2021-12-22 | Stop reason: SDUPTHER

## 2021-04-22 RX ORDER — ALBUTEROL SULFATE 90 UG/1
2 AEROSOL, METERED RESPIRATORY (INHALATION) EVERY 6 HOURS PRN
Qty: 18 G | Refills: 3 | Status: SHIPPED | OUTPATIENT
Start: 2021-04-22 | End: 2021-05-26 | Stop reason: SDUPTHER

## 2021-04-22 RX ORDER — MONTELUKAST SODIUM 10 MG/1
10 TABLET ORAL NIGHTLY
Qty: 90 TABLET | Refills: 3 | Status: SHIPPED | OUTPATIENT
Start: 2021-04-22 | End: 2021-05-22

## 2021-04-22 RX ORDER — FLUTICASONE PROPIONATE 50 MCG
1 SPRAY, SUSPENSION (ML) NASAL DAILY
Qty: 16 G | Refills: 3 | Status: SHIPPED | OUTPATIENT
Start: 2021-04-22 | End: 2022-07-19 | Stop reason: SDUPTHER

## 2021-04-22 RX ORDER — TOLTERODINE 4 MG/1
4 CAPSULE, EXTENDED RELEASE ORAL DAILY
Qty: 30 CAPSULE | Refills: 2 | Status: SHIPPED | OUTPATIENT
Start: 2021-04-22 | End: 2022-10-31

## 2021-04-22 RX ORDER — AZELASTINE 1 MG/ML
1 SPRAY, METERED NASAL 2 TIMES DAILY
Qty: 30 ML | Refills: 3 | Status: SHIPPED | OUTPATIENT
Start: 2021-04-22 | End: 2022-07-19 | Stop reason: SDUPTHER

## 2021-04-22 RX ORDER — CETIRIZINE HYDROCHLORIDE 10 MG/1
10 TABLET ORAL DAILY
Qty: 90 TABLET | Refills: 3 | Status: SHIPPED | OUTPATIENT
Start: 2021-04-22 | End: 2021-12-22

## 2021-04-22 RX ORDER — IBUPROFEN 600 MG/1
600 TABLET ORAL DAILY PRN
Qty: 14 TABLET | Refills: 0 | Status: SHIPPED | OUTPATIENT
Start: 2021-04-22 | End: 2023-07-13 | Stop reason: ALTCHOICE

## 2021-04-24 ENCOUNTER — LAB VISIT (OUTPATIENT)
Dept: LAB | Facility: HOSPITAL | Age: 41
End: 2021-04-24
Attending: INTERNAL MEDICINE
Payer: COMMERCIAL

## 2021-04-24 DIAGNOSIS — R73.03 PREDIABETES: ICD-10-CM

## 2021-04-24 DIAGNOSIS — Z11.59 NEED FOR HEPATITIS C SCREENING TEST: ICD-10-CM

## 2021-04-24 DIAGNOSIS — M79.10 MYALGIA: ICD-10-CM

## 2021-04-24 LAB
25(OH)D3+25(OH)D2 SERPL-MCNC: 16 NG/ML (ref 30–96)
ALBUMIN SERPL BCP-MCNC: 3.7 G/DL (ref 3.5–5.2)
ALP SERPL-CCNC: 92 U/L (ref 55–135)
ALT SERPL W/O P-5'-P-CCNC: 16 U/L (ref 10–44)
ANION GAP SERPL CALC-SCNC: 7 MMOL/L (ref 8–16)
AST SERPL-CCNC: 14 U/L (ref 10–40)
BASOPHILS # BLD AUTO: 0.02 K/UL (ref 0–0.2)
BASOPHILS NFR BLD: 0.5 % (ref 0–1.9)
BILIRUB SERPL-MCNC: 0.4 MG/DL (ref 0.1–1)
BUN SERPL-MCNC: 10 MG/DL (ref 6–20)
CALCIUM SERPL-MCNC: 8.8 MG/DL (ref 8.7–10.5)
CHLORIDE SERPL-SCNC: 106 MMOL/L (ref 95–110)
CHOLEST SERPL-MCNC: 193 MG/DL (ref 120–199)
CHOLEST/HDLC SERPL: 3.4 {RATIO} (ref 2–5)
CO2 SERPL-SCNC: 25 MMOL/L (ref 23–29)
CREAT SERPL-MCNC: 0.9 MG/DL (ref 0.5–1.4)
DIFFERENTIAL METHOD: NORMAL
EOSINOPHIL # BLD AUTO: 0.2 K/UL (ref 0–0.5)
EOSINOPHIL NFR BLD: 4 % (ref 0–8)
ERYTHROCYTE [DISTWIDTH] IN BLOOD BY AUTOMATED COUNT: 12.6 % (ref 11.5–14.5)
EST. GFR  (AFRICAN AMERICAN): >60 ML/MIN/1.73 M^2
EST. GFR  (NON AFRICAN AMERICAN): >60 ML/MIN/1.73 M^2
ESTIMATED AVG GLUCOSE: 128 MG/DL (ref 68–131)
GLUCOSE SERPL-MCNC: 107 MG/DL (ref 70–110)
HBA1C MFR BLD: 6.1 % (ref 4–5.6)
HCT VFR BLD AUTO: 38.6 % (ref 37–48.5)
HDLC SERPL-MCNC: 56 MG/DL (ref 40–75)
HDLC SERPL: 29 % (ref 20–50)
HGB BLD-MCNC: 12.4 G/DL (ref 12–16)
IMM GRANULOCYTES # BLD AUTO: 0.01 K/UL (ref 0–0.04)
IMM GRANULOCYTES NFR BLD AUTO: 0.2 % (ref 0–0.5)
LDLC SERPL CALC-MCNC: 124.2 MG/DL (ref 63–159)
LYMPHOCYTES # BLD AUTO: 1.7 K/UL (ref 1–4.8)
LYMPHOCYTES NFR BLD: 41.3 % (ref 18–48)
MCH RBC QN AUTO: 29.4 PG (ref 27–31)
MCHC RBC AUTO-ENTMCNC: 32.1 G/DL (ref 32–36)
MCV RBC AUTO: 92 FL (ref 82–98)
MONOCYTES # BLD AUTO: 0.4 K/UL (ref 0.3–1)
MONOCYTES NFR BLD: 9.7 % (ref 4–15)
NEUTROPHILS # BLD AUTO: 1.9 K/UL (ref 1.8–7.7)
NEUTROPHILS NFR BLD: 44.3 % (ref 38–73)
NONHDLC SERPL-MCNC: 137 MG/DL
NRBC BLD-RTO: 0 /100 WBC
PLATELET # BLD AUTO: 300 K/UL (ref 150–450)
PMV BLD AUTO: 9.2 FL (ref 9.2–12.9)
POTASSIUM SERPL-SCNC: 4.1 MMOL/L (ref 3.5–5.1)
PROT SERPL-MCNC: 7.8 G/DL (ref 6–8.4)
RBC # BLD AUTO: 4.22 M/UL (ref 4–5.4)
SODIUM SERPL-SCNC: 138 MMOL/L (ref 136–145)
TRIGL SERPL-MCNC: 64 MG/DL (ref 30–150)
TSH SERPL DL<=0.005 MIU/L-ACNC: 0.75 UIU/ML (ref 0.4–4)
WBC # BLD AUTO: 4.21 K/UL (ref 3.9–12.7)

## 2021-04-24 PROCEDURE — 83036 HEMOGLOBIN GLYCOSYLATED A1C: CPT | Performed by: INTERNAL MEDICINE

## 2021-04-24 PROCEDURE — 36415 COLL VENOUS BLD VENIPUNCTURE: CPT | Performed by: INTERNAL MEDICINE

## 2021-04-24 PROCEDURE — 80053 COMPREHEN METABOLIC PANEL: CPT | Performed by: INTERNAL MEDICINE

## 2021-04-24 PROCEDURE — 80061 LIPID PANEL: CPT | Performed by: INTERNAL MEDICINE

## 2021-04-24 PROCEDURE — 85025 COMPLETE CBC W/AUTO DIFF WBC: CPT | Performed by: INTERNAL MEDICINE

## 2021-04-24 PROCEDURE — 82306 VITAMIN D 25 HYDROXY: CPT | Performed by: INTERNAL MEDICINE

## 2021-04-24 PROCEDURE — 84443 ASSAY THYROID STIM HORMONE: CPT | Performed by: INTERNAL MEDICINE

## 2021-04-24 PROCEDURE — 86803 HEPATITIS C AB TEST: CPT | Performed by: INTERNAL MEDICINE

## 2021-04-26 ENCOUNTER — TELEPHONE (OUTPATIENT)
Dept: INTERNAL MEDICINE | Facility: CLINIC | Age: 41
End: 2021-04-26

## 2021-04-26 DIAGNOSIS — E55.9 VITAMIN D DEFICIENCY: Primary | ICD-10-CM

## 2021-04-26 LAB — HCV AB SERPL QL IA: NEGATIVE

## 2021-04-26 RX ORDER — ERGOCALCIFEROL 1.25 MG/1
50000 CAPSULE ORAL
Qty: 12 CAPSULE | Refills: 0 | Status: SHIPPED | OUTPATIENT
Start: 2021-04-26 | End: 2021-07-13

## 2021-05-19 ENCOUNTER — TELEPHONE (OUTPATIENT)
Dept: INTERNAL MEDICINE | Facility: CLINIC | Age: 41
End: 2021-05-19

## 2021-05-26 ENCOUNTER — TELEPHONE (OUTPATIENT)
Dept: INTERNAL MEDICINE | Facility: CLINIC | Age: 41
End: 2021-05-26

## 2021-05-26 ENCOUNTER — OFFICE VISIT (OUTPATIENT)
Dept: INTERNAL MEDICINE | Facility: CLINIC | Age: 41
End: 2021-05-26
Payer: COMMERCIAL

## 2021-05-26 VITALS
HEIGHT: 66 IN | BODY MASS INDEX: 35.82 KG/M2 | OXYGEN SATURATION: 98 % | HEART RATE: 90 BPM | RESPIRATION RATE: 16 BRPM | WEIGHT: 222.88 LBS | SYSTOLIC BLOOD PRESSURE: 120 MMHG | DIASTOLIC BLOOD PRESSURE: 66 MMHG

## 2021-05-26 DIAGNOSIS — R07.89 CHEST TIGHTNESS: ICD-10-CM

## 2021-05-26 DIAGNOSIS — J45.20 MILD INTERMITTENT ASTHMA WITH ALLERGIC RHINITIS WITHOUT COMPLICATION: Primary | ICD-10-CM

## 2021-05-26 PROCEDURE — 93010 EKG 12-LEAD: ICD-10-PCS | Mod: S$GLB,,, | Performed by: INTERNAL MEDICINE

## 2021-05-26 PROCEDURE — 93010 ELECTROCARDIOGRAM REPORT: CPT | Mod: S$GLB,,, | Performed by: INTERNAL MEDICINE

## 2021-05-26 PROCEDURE — 93005 ELECTROCARDIOGRAM TRACING: CPT | Mod: S$GLB,,, | Performed by: PHYSICIAN ASSISTANT

## 2021-05-26 PROCEDURE — 99999 PR PBB SHADOW E&M-EST. PATIENT-LVL III: ICD-10-PCS | Mod: PBBFAC,,, | Performed by: PHYSICIAN ASSISTANT

## 2021-05-26 PROCEDURE — 99214 PR OFFICE/OUTPT VISIT, EST, LEVL IV, 30-39 MIN: ICD-10-PCS | Mod: S$GLB,,, | Performed by: PHYSICIAN ASSISTANT

## 2021-05-26 PROCEDURE — 93005 EKG 12-LEAD: ICD-10-PCS | Mod: S$GLB,,, | Performed by: PHYSICIAN ASSISTANT

## 2021-05-26 PROCEDURE — 99214 OFFICE O/P EST MOD 30 MIN: CPT | Mod: S$GLB,,, | Performed by: PHYSICIAN ASSISTANT

## 2021-05-26 PROCEDURE — 99999 PR PBB SHADOW E&M-EST. PATIENT-LVL III: CPT | Mod: PBBFAC,,, | Performed by: PHYSICIAN ASSISTANT

## 2021-05-26 RX ORDER — PREDNISONE 20 MG/1
20 TABLET ORAL DAILY
Qty: 5 TABLET | Refills: 0 | Status: SHIPPED | OUTPATIENT
Start: 2021-05-26 | End: 2021-05-31

## 2021-05-26 RX ORDER — ALBUTEROL SULFATE 90 UG/1
2 AEROSOL, METERED RESPIRATORY (INHALATION) EVERY 6 HOURS PRN
Qty: 18 G | Refills: 3 | Status: SHIPPED | OUTPATIENT
Start: 2021-05-26 | End: 2021-12-22 | Stop reason: SDUPTHER

## 2021-05-29 ENCOUNTER — HOSPITAL ENCOUNTER (OUTPATIENT)
Dept: RADIOLOGY | Facility: HOSPITAL | Age: 41
Discharge: HOME | End: 2021-05-29
Attending: INTERNAL MEDICINE
Payer: COMMERCIAL

## 2021-05-29 DIAGNOSIS — Z12.31 SCREENING MAMMOGRAM, ENCOUNTER FOR: ICD-10-CM

## 2021-05-29 PROCEDURE — 77063 MAMMO DIGITAL SCREENING BILAT WITH TOMO: ICD-10-PCS | Mod: 26,,, | Performed by: RADIOLOGY

## 2021-05-29 PROCEDURE — 77067 SCR MAMMO BI INCL CAD: CPT | Mod: TC

## 2021-05-29 PROCEDURE — 77067 SCR MAMMO BI INCL CAD: CPT | Mod: 26,,, | Performed by: RADIOLOGY

## 2021-05-29 PROCEDURE — 77063 BREAST TOMOSYNTHESIS BI: CPT | Mod: 26,,, | Performed by: RADIOLOGY

## 2021-05-29 PROCEDURE — 77067 MAMMO DIGITAL SCREENING BILAT WITH TOMO: ICD-10-PCS | Mod: 26,,, | Performed by: RADIOLOGY

## 2021-07-07 ENCOUNTER — PATIENT MESSAGE (OUTPATIENT)
Dept: INTERNAL MEDICINE | Facility: CLINIC | Age: 41
End: 2021-07-07

## 2021-07-08 ENCOUNTER — OFFICE VISIT (OUTPATIENT)
Dept: INTERNAL MEDICINE | Facility: CLINIC | Age: 41
End: 2021-07-08
Payer: COMMERCIAL

## 2021-07-08 ENCOUNTER — LAB VISIT (OUTPATIENT)
Dept: LAB | Facility: HOSPITAL | Age: 41
End: 2021-07-08
Payer: COMMERCIAL

## 2021-07-08 VITALS
HEIGHT: 66 IN | DIASTOLIC BLOOD PRESSURE: 84 MMHG | BODY MASS INDEX: 35.89 KG/M2 | HEART RATE: 86 BPM | OXYGEN SATURATION: 97 % | WEIGHT: 223.31 LBS | TEMPERATURE: 98 F | SYSTOLIC BLOOD PRESSURE: 132 MMHG

## 2021-07-08 DIAGNOSIS — K21.9 GASTROESOPHAGEAL REFLUX DISEASE, UNSPECIFIED WHETHER ESOPHAGITIS PRESENT: ICD-10-CM

## 2021-07-08 DIAGNOSIS — K21.9 GASTROESOPHAGEAL REFLUX DISEASE, UNSPECIFIED WHETHER ESOPHAGITIS PRESENT: Primary | ICD-10-CM

## 2021-07-08 LAB — LIPASE SERPL-CCNC: 39 U/L (ref 4–60)

## 2021-07-08 PROCEDURE — 99999 PR PBB SHADOW E&M-EST. PATIENT-LVL IV: CPT | Mod: PBBFAC,,, | Performed by: INTERNAL MEDICINE

## 2021-07-08 PROCEDURE — 36415 COLL VENOUS BLD VENIPUNCTURE: CPT | Performed by: INTERNAL MEDICINE

## 2021-07-08 PROCEDURE — 99214 OFFICE O/P EST MOD 30 MIN: CPT | Mod: S$GLB,,, | Performed by: INTERNAL MEDICINE

## 2021-07-08 PROCEDURE — 99214 PR OFFICE/OUTPT VISIT, EST, LEVL IV, 30-39 MIN: ICD-10-PCS | Mod: S$GLB,,, | Performed by: INTERNAL MEDICINE

## 2021-07-08 PROCEDURE — 83690 ASSAY OF LIPASE: CPT | Performed by: INTERNAL MEDICINE

## 2021-07-08 PROCEDURE — 99999 PR PBB SHADOW E&M-EST. PATIENT-LVL IV: ICD-10-PCS | Mod: PBBFAC,,, | Performed by: INTERNAL MEDICINE

## 2021-07-08 RX ORDER — BUTALBITAL, ACETAMINOPHEN AND CAFFEINE 50; 325; 40 MG/1; MG/1; MG/1
1 TABLET ORAL EVERY 4 HOURS PRN
Qty: 30 TABLET | Refills: 1 | Status: SHIPPED | OUTPATIENT
Start: 2021-07-08 | End: 2021-08-07

## 2021-07-08 RX ORDER — ESOMEPRAZOLE MAGNESIUM 40 MG/1
40 CAPSULE, DELAYED RELEASE ORAL
Qty: 30 CAPSULE | Refills: 11 | Status: SHIPPED | OUTPATIENT
Start: 2021-07-08 | End: 2023-05-10

## 2021-07-08 RX ORDER — ONDANSETRON 4 MG/1
4 TABLET, FILM COATED ORAL EVERY 6 HOURS PRN
Qty: 20 TABLET | Refills: 1 | Status: SHIPPED | OUTPATIENT
Start: 2021-07-08 | End: 2023-05-10

## 2021-07-09 ENCOUNTER — HOSPITAL ENCOUNTER (OUTPATIENT)
Dept: RADIOLOGY | Facility: OTHER | Age: 41
Discharge: HOME | End: 2021-07-09
Attending: INTERNAL MEDICINE
Payer: COMMERCIAL

## 2021-07-09 DIAGNOSIS — K21.9 GASTROESOPHAGEAL REFLUX DISEASE, UNSPECIFIED WHETHER ESOPHAGITIS PRESENT: ICD-10-CM

## 2021-07-09 PROCEDURE — 76700 US EXAM ABDOM COMPLETE: CPT | Mod: 26,,, | Performed by: INTERNAL MEDICINE

## 2021-07-09 PROCEDURE — 76700 US EXAM ABDOM COMPLETE: CPT | Mod: TC

## 2021-07-09 PROCEDURE — 76700 US ABDOMEN COMPLETE: ICD-10-PCS | Mod: 26,,, | Performed by: INTERNAL MEDICINE

## 2021-07-22 ENCOUNTER — PATIENT OUTREACH (OUTPATIENT)
Dept: ADMINISTRATIVE | Facility: OTHER | Age: 41
End: 2021-07-22

## 2021-09-08 ENCOUNTER — TELEPHONE (OUTPATIENT)
Dept: GASTROENTEROLOGY | Facility: CLINIC | Age: 41
End: 2021-09-08

## 2021-09-15 ENCOUNTER — TELEPHONE (OUTPATIENT)
Dept: GASTROENTEROLOGY | Facility: CLINIC | Age: 41
End: 2021-09-15

## 2021-09-21 NOTE — OP NOTE
[facility]  Plastic Surgery  Operative Note    SUMMARY     Date of Procedure: 6/12/2020     Procedure: Procedure(s) (LRB):  MAMMOPLASTY, REDUCTION, BILATERAL (Bilateral)     Surgeon(s) and Role:     * Marvin Levine MD - Primary    Assisting Surgeon: None    Pre-Operative Diagnosis: Macromastia [N62]    Post-Operative Diagnosis: * No post-op diagnosis entered *    Anesthesia: General    Indications:   40 year old female with macromastia.  Written consent obtained.  We discussed risks, including risk of bleeding, infection, hematoma, seroma, scabbing of nipple, altered sensation in nipple, possibility of free nipple grafting.        Procedure:   The patient was transported to the OR and placed in a supine position, where general anesthesia was induced.  An appropriate time out was performed.  She was prepped and draped in standard fashion.  Heparin SQ was dosed.      Starting on the right, the pedicle was de-epithelialized.  C shaped skin resection was performed after raising mastectomy flaps.  Meticulous hemostasis was obtained.  Thrombin foam was used during the procedure to control bleeding.  Drains were placed through the inferior IMF and secured to bulb suction.  A similar matching procedure was performed on the left side.       The nipple and areola were reinset and skin was closed with 3-0 vicryl, 3-0 monocryl, and 3-0 quill.  The drains were confirmed to be to bulb suction.  A prineo dressing was placed over the breasts and a xeroform dressing was placed over the nipple areola.       All counts were correct, abdalla was removed in the OR.  She was placed in an appropriately sized surgical bra.       Complications: No    Estimated Blood Loss (EBL): 100 cc           Implants: * No implants in log *    Specimens:   Specimen (12h ago, onward)    None                  Condition: Good    Disposition: PACU hemodynamically stable    Attestation: I was scrubbed for the critical portions of the  procedure   feels better

## 2021-12-22 ENCOUNTER — LAB VISIT (OUTPATIENT)
Dept: LAB | Facility: HOSPITAL | Age: 41
End: 2021-12-22
Attending: INTERNAL MEDICINE
Payer: COMMERCIAL

## 2021-12-22 ENCOUNTER — OFFICE VISIT (OUTPATIENT)
Dept: INTERNAL MEDICINE | Facility: CLINIC | Age: 41
End: 2021-12-22
Payer: COMMERCIAL

## 2021-12-22 VITALS
WEIGHT: 230.19 LBS | HEART RATE: 96 BPM | DIASTOLIC BLOOD PRESSURE: 82 MMHG | HEIGHT: 66 IN | OXYGEN SATURATION: 99 % | SYSTOLIC BLOOD PRESSURE: 122 MMHG | BODY MASS INDEX: 36.99 KG/M2

## 2021-12-22 DIAGNOSIS — E55.9 VITAMIN D DEFICIENCY: ICD-10-CM

## 2021-12-22 DIAGNOSIS — I10 BENIGN ESSENTIAL HYPERTENSION: ICD-10-CM

## 2021-12-22 DIAGNOSIS — R73.03 PREDIABETES: ICD-10-CM

## 2021-12-22 DIAGNOSIS — J45.20 MILD INTERMITTENT ASTHMA WITH ALLERGIC RHINITIS WITHOUT COMPLICATION: Primary | ICD-10-CM

## 2021-12-22 DIAGNOSIS — E66.01 CLASS 2 SEVERE OBESITY DUE TO EXCESS CALORIES WITH SERIOUS COMORBIDITY AND BODY MASS INDEX (BMI) OF 37.0 TO 37.9 IN ADULT: ICD-10-CM

## 2021-12-22 LAB
25(OH)D3+25(OH)D2 SERPL-MCNC: 14 NG/ML (ref 30–96)
ALBUMIN SERPL BCP-MCNC: 3.7 G/DL (ref 3.5–5.2)
ALP SERPL-CCNC: 81 U/L (ref 55–135)
ALT SERPL W/O P-5'-P-CCNC: 17 U/L (ref 10–44)
ANION GAP SERPL CALC-SCNC: 7 MMOL/L (ref 8–16)
AST SERPL-CCNC: 17 U/L (ref 10–40)
BILIRUB SERPL-MCNC: 0.3 MG/DL (ref 0.1–1)
BUN SERPL-MCNC: 11 MG/DL (ref 6–20)
CALCIUM SERPL-MCNC: 9.7 MG/DL (ref 8.7–10.5)
CHLORIDE SERPL-SCNC: 104 MMOL/L (ref 95–110)
CO2 SERPL-SCNC: 28 MMOL/L (ref 23–29)
CREAT SERPL-MCNC: 0.7 MG/DL (ref 0.5–1.4)
EST. GFR  (AFRICAN AMERICAN): >60 ML/MIN/1.73 M^2
EST. GFR  (NON AFRICAN AMERICAN): >60 ML/MIN/1.73 M^2
ESTIMATED AVG GLUCOSE: 131 MG/DL (ref 68–131)
GLUCOSE SERPL-MCNC: 93 MG/DL (ref 70–110)
HBA1C MFR BLD: 6.2 % (ref 4–5.6)
POTASSIUM SERPL-SCNC: 4.4 MMOL/L (ref 3.5–5.1)
PROT SERPL-MCNC: 7.5 G/DL (ref 6–8.4)
SODIUM SERPL-SCNC: 139 MMOL/L (ref 136–145)

## 2021-12-22 PROCEDURE — 83036 HEMOGLOBIN GLYCOSYLATED A1C: CPT | Performed by: INTERNAL MEDICINE

## 2021-12-22 PROCEDURE — 99214 OFFICE O/P EST MOD 30 MIN: CPT | Mod: S$GLB,,, | Performed by: INTERNAL MEDICINE

## 2021-12-22 PROCEDURE — 99999 PR PBB SHADOW E&M-EST. PATIENT-LVL IV: ICD-10-PCS | Mod: PBBFAC,,, | Performed by: INTERNAL MEDICINE

## 2021-12-22 PROCEDURE — 99214 PR OFFICE/OUTPT VISIT, EST, LEVL IV, 30-39 MIN: ICD-10-PCS | Mod: S$GLB,,, | Performed by: INTERNAL MEDICINE

## 2021-12-22 PROCEDURE — 80053 COMPREHEN METABOLIC PANEL: CPT | Performed by: INTERNAL MEDICINE

## 2021-12-22 PROCEDURE — 36415 COLL VENOUS BLD VENIPUNCTURE: CPT | Performed by: INTERNAL MEDICINE

## 2021-12-22 PROCEDURE — 99999 PR PBB SHADOW E&M-EST. PATIENT-LVL IV: CPT | Mod: PBBFAC,,, | Performed by: INTERNAL MEDICINE

## 2021-12-22 PROCEDURE — 82306 VITAMIN D 25 HYDROXY: CPT | Performed by: INTERNAL MEDICINE

## 2021-12-22 RX ORDER — ALBUTEROL SULFATE 90 UG/1
2 AEROSOL, METERED RESPIRATORY (INHALATION) EVERY 6 HOURS PRN
Qty: 18 G | Refills: 3 | Status: SHIPPED | OUTPATIENT
Start: 2021-12-22 | End: 2023-05-10 | Stop reason: SDUPTHER

## 2021-12-22 RX ORDER — FLUTICASONE PROPIONATE AND SALMETEROL 250; 50 UG/1; UG/1
1 POWDER RESPIRATORY (INHALATION) 2 TIMES DAILY
Qty: 14 EACH | Refills: 3 | Status: SHIPPED | OUTPATIENT
Start: 2021-12-22 | End: 2023-05-10 | Stop reason: SDUPTHER

## 2021-12-22 RX ORDER — LEVOCETIRIZINE DIHYDROCHLORIDE 5 MG/1
5 TABLET, FILM COATED ORAL NIGHTLY
Qty: 90 TABLET | Refills: 3 | Status: SHIPPED | OUTPATIENT
Start: 2021-12-22 | End: 2022-07-19 | Stop reason: SDUPTHER

## 2021-12-22 RX ORDER — AMLODIPINE BESYLATE 5 MG/1
5 TABLET ORAL DAILY
Qty: 90 TABLET | Refills: 3 | Status: SHIPPED | OUTPATIENT
Start: 2021-12-22 | End: 2023-05-10 | Stop reason: SDUPTHER

## 2021-12-23 DIAGNOSIS — E55.9 VITAMIN D DEFICIENCY: Primary | ICD-10-CM

## 2021-12-23 RX ORDER — ERGOCALCIFEROL 1.25 MG/1
50000 CAPSULE ORAL
Qty: 12 CAPSULE | Refills: 3 | Status: SHIPPED | OUTPATIENT
Start: 2021-12-23 | End: 2023-05-10 | Stop reason: SDUPTHER

## 2022-01-05 ENCOUNTER — TELEPHONE (OUTPATIENT)
Dept: BARIATRICS | Facility: CLINIC | Age: 42
End: 2022-01-05
Payer: COMMERCIAL

## 2022-02-18 ENCOUNTER — PATIENT OUTREACH (OUTPATIENT)
Dept: ADMINISTRATIVE | Facility: OTHER | Age: 42
End: 2022-02-18
Payer: COMMERCIAL

## 2022-02-18 NOTE — PROGRESS NOTES
Health Maintenance Due   Topic Date Due    TETANUS VACCINE  09/12/2017     Updates were requested from care everywhere.  Chart was reviewed for overdue Proactive [facility] ([facility]) topics (CRS, Breast Cancer Screening, Eye exam)  Health Maintenance has been updated.  LINKS immunization registry triggered.  Immunizations were reconciled.

## 2022-03-28 NOTE — TELEPHONE ENCOUNTER
Called and spoke with pt and she stated that she was having a headache and a little nausea // pt stated that she was doing ok outside of that. I told pt to stay rested and if she felt any additional pain to take a tylenol. Pt verbalized understanding.   no epistaxis

## 2022-07-16 ENCOUNTER — HOSPITAL ENCOUNTER (EMERGENCY)
Facility: HOSPITAL | Age: 42
Discharge: HOME | End: 2022-07-17
Attending: INTERNAL MEDICINE
Payer: COMMERCIAL

## 2022-07-16 DIAGNOSIS — R07.9 CHEST PAIN: ICD-10-CM

## 2022-07-16 DIAGNOSIS — H10.9 CONJUNCTIVITIS OF BOTH EYES, UNSPECIFIED CONJUNCTIVITIS TYPE: Primary | ICD-10-CM

## 2022-07-16 PROCEDURE — 93010 ELECTROCARDIOGRAM REPORT: CPT | Mod: ,,, | Performed by: INTERNAL MEDICINE

## 2022-07-16 PROCEDURE — 93010 EKG 12-LEAD: ICD-10-PCS | Mod: ,,, | Performed by: INTERNAL MEDICINE

## 2022-07-16 PROCEDURE — 93005 ELECTROCARDIOGRAM TRACING: CPT | Mod: ER

## 2022-07-16 PROCEDURE — 99285 EMERGENCY DEPT VISIT HI MDM: CPT | Mod: 25,ER

## 2022-07-16 NOTE — Clinical Note
"Cristina Man (Kendra) was seen and treated in our emergency department on 7/16/2022.  She may return to work on 07/19/2022.       If you have any questions or concerns, please don't hesitate to call.      Sanchez YOUNG RN    "

## 2022-07-17 VITALS
HEIGHT: 66 IN | SYSTOLIC BLOOD PRESSURE: 144 MMHG | HEART RATE: 77 BPM | BODY MASS INDEX: 36.96 KG/M2 | DIASTOLIC BLOOD PRESSURE: 84 MMHG | WEIGHT: 230 LBS | TEMPERATURE: 98 F | RESPIRATION RATE: 16 BRPM | OXYGEN SATURATION: 99 %

## 2022-07-17 PROBLEM — R07.9 CHEST PAIN: Status: ACTIVE | Noted: 2019-06-10

## 2022-07-17 PROBLEM — H10.9 CONJUNCTIVITIS OF BOTH EYES: Status: ACTIVE | Noted: 2022-07-17

## 2022-07-17 LAB
ALBUMIN SERPL-MCNC: 3.7 G/DL (ref 3.3–5.5)
ALP SERPL-CCNC: 88 U/L (ref 42–141)
BILIRUB SERPL-MCNC: 0.5 MG/DL (ref 0.2–1.6)
BUN SERPL-MCNC: 10 MG/DL (ref 7–22)
CALCIUM SERPL-MCNC: 9.5 MG/DL (ref 8–10.3)
CHLORIDE SERPL-SCNC: 105 MMOL/L (ref 98–108)
CREAT SERPL-MCNC: 0.4 MG/DL (ref 0.6–1.2)
GLUCOSE SERPL-MCNC: 110 MG/DL (ref 73–118)
HCT, POC: NORMAL
HGB, POC: NORMAL (ref 14–18)
MCH, POC: NORMAL
MCHC, POC: NORMAL
MCV, POC: NORMAL
MPV, POC: NORMAL
POC ALT (SGPT): 20 U/L (ref 10–47)
POC AST (SGOT): 23 U/L (ref 11–38)
POC CARDIAC TROPONIN I: 0.01 NG/ML
POC PLATELET COUNT: NORMAL
POC TCO2: 29 MMOL/L (ref 18–33)
POTASSIUM BLD-SCNC: 3.7 MMOL/L (ref 3.6–5.1)
PROTEIN, POC: 7.4 G/DL (ref 6.4–8.1)
RBC, POC: NORMAL
RDW, POC: NORMAL
SAMPLE: NORMAL
SODIUM BLD-SCNC: 141 MMOL/L (ref 128–145)
WBC, POC: NORMAL

## 2022-07-17 PROCEDURE — 84484 ASSAY OF TROPONIN QUANT: CPT | Mod: ER

## 2022-07-17 PROCEDURE — 80053 COMPREHEN METABOLIC PANEL: CPT | Mod: ER

## 2022-07-17 PROCEDURE — 85025 COMPLETE CBC W/AUTO DIFF WBC: CPT | Mod: ER

## 2022-07-17 PROCEDURE — 25000003 PHARM REV CODE 250: Mod: ER | Performed by: INTERNAL MEDICINE

## 2022-07-17 RX ORDER — TOBRAMYCIN AND DEXAMETHASONE 3; 1 MG/ML; MG/ML
2 SUSPENSION/ DROPS OPHTHALMIC
Qty: 5 ML | Refills: 0 | Status: SHIPPED | OUTPATIENT
Start: 2022-07-17 | End: 2022-07-22

## 2022-07-17 RX ORDER — PROPARACAINE HYDROCHLORIDE 5 MG/ML
1 SOLUTION/ DROPS OPHTHALMIC
Status: COMPLETED | OUTPATIENT
Start: 2022-07-17 | End: 2022-07-17

## 2022-07-17 RX ADMIN — PROPARACAINE HYDROCHLORIDE 1 DROP: 5 SOLUTION/ DROPS OPHTHALMIC at 12:07

## 2022-07-17 NOTE — ED PROVIDER NOTES
Encounter Date: 7/16/2022    SCRIBE #1 NOTE: I, Aaron Borrego, am scribing for, and in the presence of,  Dr. Valdez. I have scribed the following portions of the note - Other sections scribed: HPI, ROS, PE.       History     Chief Complaint   Patient presents with    Chest Pain     Reports chest tightness that started this evening. Reports Hx of asthma. Last use of rescue inhaler was just PTA. Also reports bilateral eye pressure. Left eye greater than right eye with tearing. Denies vision disturbance     Cristina Man is a 42 y.o. female with Hx of asthma and allergic rhinitis who presents to the ED for evaluation of acute bilateral eye pressure, redness, and tearing onset this morning with chest tightness onset this evening. Last use of rescue inhaler was just PTA. Patient endorses intermittent rhinorrhea but denies fever, SOB, or visual disturbance.    Patient is requesting POCT glucose testing as she is a prediabetic and has been having urinary frequency lately. No further complaints at the present time.    The history is provided by the patient. No  was used.     Review of patient's allergies indicates:  No Known Allergies  Past Medical History:   Diagnosis Date    Abnormal Pap smear of cervix     Allergic rhinitis due to allergen     Asthma at age 4     Past Surgical History:   Procedure Laterality Date    HEMORRHOID SURGERY  2009    HYSTERECTOMY  2015    REDUCTION OF BOTH BREASTS Bilateral 6/12/2020    Procedure: MAMMOPLASTY, REDUCTION, BILATERAL;  Surgeon: Marvin Levine MD;  Location: [facility] OR;  Service: Plastics;  Laterality: Bilateral;     Family History   Problem Relation Age of Onset    Hypertension Mother     Cirrhosis Father         alcoholic    Hypertension Brother     Heart failure Maternal Grandmother     Hypertension Maternal Grandmother     Diabetes Maternal Grandmother     Hypertension Maternal Grandfather         DM    Diabetes Maternal Grandfather     Lung  cancer Paternal Grandmother     Hypertension Paternal Grandmother     Diabetes Paternal Grandmother     Breast cancer Neg Hx     Colon cancer Neg Hx     Ovarian cancer Neg Hx      Social History     Tobacco Use    Smoking status: Never Smoker    Smokeless tobacco: Never Used   Substance Use Topics    Alcohol use: Yes     Comment: occ    Drug use: No     Review of Systems   Constitutional: Negative for fever.   HENT: Positive for rhinorrhea.    Eyes: Positive for pain and redness. Negative for visual disturbance.        (+) eye tearing   Respiratory: Negative for shortness of breath.    Genitourinary: Positive for frequency.   All other systems reviewed and are negative.      Physical Exam     Initial Vitals [07/16/22 2146]   BP Pulse Resp Temp SpO2   (!) 168/88 72 19 97.9 °F (36.6 °C) 100 %      MAP       --         Physical Exam    Nursing note and vitals reviewed.  Constitutional: She appears well-developed and well-nourished.   HENT:   Head: Normocephalic and atraumatic.   Eyes: Right conjunctiva is injected. Left conjunctiva is injected.   Neck: Neck supple.   Normal range of motion.  Cardiovascular: Normal rate, regular rhythm and normal heart sounds. Exam reveals no gallop and no friction rub.    No murmur heard.  Pulmonary/Chest: Breath sounds normal. No respiratory distress. She has no wheezes. She has no rhonchi. She has no rales.   Abdominal: Abdomen is soft. There is no abdominal tenderness.   Musculoskeletal:         General: No edema. Normal range of motion.      Cervical back: Normal range of motion and neck supple.     Neurological: She is alert and oriented to person, place, and time. GCS score is 15. GCS eye subscore is 4. GCS verbal subscore is 5. GCS motor subscore is 6.   Skin: Skin is warm and dry.   Psychiatric: She has a normal mood and affect.         ED Course   Procedures  Labs Reviewed   POCT CMP - Abnormal; Notable for the following components:       Result Value    POC  Creatinine 0.4 (*)     All other components within normal limits   TROPONIN ISTAT   POCT CBC   POCT CMP   POCT TROPONIN         EKG Readings: (Independently Interpreted)   Previous EKG Date: 68. Rhythm: Normal Sinus Rhythm. Ectopy: No Ectopy. Conduction: Normal. ST Segments: Normal ST Segments. T Waves: Normal. Clinical Impression: Normal Sinus Rhythm       Imaging Results          X-Ray Chest PA And Lateral (Final result)  Result time 07/16/22 22:21:07    Final result by Jim Arevalo MD (07/16/22 22:21:07)                 Impression:      There is no radiographic evidence for acute intrathoracic process.      Electronically signed by: Jim Arevalo  Date:    07/16/2022  Time:    22:21             Narrative:    EXAMINATION:  XR CHEST PA AND LATERAL    CLINICAL HISTORY:  chest pain;    TECHNIQUE:  PA and lateral views of the chest were performed.    COMPARISON:  Chest radiograph March 23, 2007    FINDINGS:  Two views of the chest are submitted.  Aortic atherosclerotic change noted.  The cardiomediastinal silhouette appears appropriate for position, and depth of inspiration.    There is no evidence for confluent infiltrate or consolidation, significant pleural effusion or pneumothorax.  The osseous structures demonstrate chronic change.                                 Medications   proparacaine 0.5 % ophthalmic solution 1 drop (1 drop Both Eyes Given 7/17/22 0016)     Medical Decision Making:   History:   Old Medical Records: I decided to obtain old medical records.  Initial Assessment:   Cristina Man is a 42 y.o. female with Hx of asthma and allergic rhinitis who presents to the ED for evaluation of acute bilateral eye pressure, redness, and tearing onset this morning with chest tightness onset this evening. Last use of rescue inhaler was just PTA. Patient endorses intermittent rhinorrhea but denies fever, SOB, or visual disturbance.    Patient is requesting POCT glucose testing as she is a prediabetic and  has been having urinary frequency lately. No further complaints at the present time.  Independently Interpreted Test(s):   I have ordered and independently interpreted X-rays - see prior notes.  I have ordered and independently interpreted EKG Reading(s) - see prior notes  Clinical Tests:   Lab Tests: Ordered and Reviewed  Radiological Study: Ordered and Reviewed  Medical Tests: Ordered and Reviewed  ED Management:  Cardiac evaluation reveals no emergent disease at this time.  Patient was given instructions for conjunctivitis and received prescription for TobraDex.  She was advised to follow-up with her primary care physician within the next week for re-evaluation/return to the emergency department if condition worsens.          Scribe Attestation:   Scribe #1: I performed the above scribed service and the documentation accurately describes the services I performed. I attest to the accuracy of the note.               This document was produced by a scribe under my direction and in my presence. I agree with the content of the note and have made any necessary edits.     Dr. Valdez    07/17/2022 6:51 AM    Clinical Impression:   Final diagnoses:  [R07.9] Chest pain  [H10.9] Conjunctivitis of both eyes, unspecified conjunctivitis type (Primary)          ED Disposition Condition    Discharge Stable        ED Prescriptions     Medication Sig Dispense Start Date End Date Auth. Provider    tobramycin-dexamethasone 0.3-0.1% (TOBRADEX) 0.3-0.1 % DrpS Place 2 drops into both eyes every 4 (four) hours while awake. for 5 days 5 mL 7/17/2022 7/22/2022 Kang Valdez MD        Follow-up Information     Follow up With Specialties Details Why Contact Info    Madelaine Hayden MD Internal Medicine Schedule an appointment as soon as possible for a visit in 2 days For reevaluation [address]  [place] [address]  [phone]             Kang Valdez MD  07/17/22 0450

## 2022-07-19 ENCOUNTER — PATIENT MESSAGE (OUTPATIENT)
Dept: NEUROLOGY | Facility: CLINIC | Age: 42
End: 2022-07-19
Payer: COMMERCIAL

## 2022-07-19 ENCOUNTER — TELEPHONE (OUTPATIENT)
Dept: OPHTHALMOLOGY | Facility: CLINIC | Age: 42
End: 2022-07-19
Payer: COMMERCIAL

## 2022-07-19 ENCOUNTER — OFFICE VISIT (OUTPATIENT)
Dept: INTERNAL MEDICINE | Facility: CLINIC | Age: 42
End: 2022-07-19
Payer: COMMERCIAL

## 2022-07-19 ENCOUNTER — PATIENT MESSAGE (OUTPATIENT)
Dept: INTERNAL MEDICINE | Facility: CLINIC | Age: 42
End: 2022-07-19

## 2022-07-19 ENCOUNTER — PATIENT MESSAGE (OUTPATIENT)
Dept: INTERNAL MEDICINE | Facility: CLINIC | Age: 42
End: 2022-07-19
Payer: COMMERCIAL

## 2022-07-19 VITALS
OXYGEN SATURATION: 99 % | SYSTOLIC BLOOD PRESSURE: 140 MMHG | WEIGHT: 219.81 LBS | HEIGHT: 66 IN | BODY MASS INDEX: 35.32 KG/M2 | DIASTOLIC BLOOD PRESSURE: 80 MMHG | HEART RATE: 85 BPM

## 2022-07-19 DIAGNOSIS — H57.13 EYE PAIN, BILATERAL: Primary | ICD-10-CM

## 2022-07-19 DIAGNOSIS — G43.009 MIGRAINE WITHOUT AURA AND WITHOUT STATUS MIGRAINOSUS, NOT INTRACTABLE: ICD-10-CM

## 2022-07-19 DIAGNOSIS — J45.20 MILD INTERMITTENT ASTHMA WITH ALLERGIC RHINITIS WITHOUT COMPLICATION: ICD-10-CM

## 2022-07-19 DIAGNOSIS — G44.209 TENSION HEADACHE: ICD-10-CM

## 2022-07-19 DIAGNOSIS — M54.2 NECK AND SHOULDER PAIN: ICD-10-CM

## 2022-07-19 DIAGNOSIS — J30.9 ALLERGIC SINUSITIS: ICD-10-CM

## 2022-07-19 DIAGNOSIS — M25.519 NECK AND SHOULDER PAIN: ICD-10-CM

## 2022-07-19 DIAGNOSIS — Z12.31 SCREENING MAMMOGRAM, ENCOUNTER FOR: Primary | ICD-10-CM

## 2022-07-19 PROCEDURE — 99214 OFFICE O/P EST MOD 30 MIN: CPT | Mod: S$GLB,,, | Performed by: PHYSICIAN ASSISTANT

## 2022-07-19 PROCEDURE — 99214 PR OFFICE/OUTPT VISIT, EST, LEVL IV, 30-39 MIN: ICD-10-PCS | Mod: S$GLB,,, | Performed by: PHYSICIAN ASSISTANT

## 2022-07-19 PROCEDURE — 99999 PR PBB SHADOW E&M-EST. PATIENT-LVL V: CPT | Mod: PBBFAC,,, | Performed by: PHYSICIAN ASSISTANT

## 2022-07-19 PROCEDURE — 99999 PR PBB SHADOW E&M-EST. PATIENT-LVL V: ICD-10-PCS | Mod: PBBFAC,,, | Performed by: PHYSICIAN ASSISTANT

## 2022-07-19 RX ORDER — FLUTICASONE PROPIONATE 50 MCG
1 SPRAY, SUSPENSION (ML) NASAL DAILY
Qty: 16 G | Refills: 3 | Status: SHIPPED | OUTPATIENT
Start: 2022-07-19 | End: 2023-05-10 | Stop reason: SDUPTHER

## 2022-07-19 RX ORDER — AZELASTINE 1 MG/ML
1 SPRAY, METERED NASAL 2 TIMES DAILY
Qty: 30 ML | Refills: 3 | Status: SHIPPED | OUTPATIENT
Start: 2022-07-19 | End: 2023-05-10 | Stop reason: SDUPTHER

## 2022-07-19 RX ORDER — TIZANIDINE 4 MG/1
4 TABLET ORAL NIGHTLY PRN
Qty: 20 TABLET | Refills: 0 | Status: SHIPPED | OUTPATIENT
Start: 2022-07-19 | End: 2022-08-05

## 2022-07-19 RX ORDER — LEVOCETIRIZINE DIHYDROCHLORIDE 5 MG/1
5 TABLET, FILM COATED ORAL NIGHTLY
Qty: 90 TABLET | Refills: 3 | Status: SHIPPED | OUTPATIENT
Start: 2022-07-19 | End: 2023-05-10 | Stop reason: SDUPTHER

## 2022-07-19 NOTE — PROGRESS NOTES
"    Subjective:       Patient ID: Cristina Man is a 42 y.o. female.    Chief Complaint: Follow-up (ER f/u), Headache, and Shoulder Pain    HPI     Established pt of Madelaine Hayden MD (new to me)      Here for a few concerns today      ED f/u: seen there 3 days ago for eye pain (b/l that she rates 10/10) Note eyes were sensitive light, runny, irritating, causing headaches. Describes vision as "aggravating" trouble reading close and far way. Has eye glass prescriptions but old. She was prescribed Tobradex in the ED, with moderate improvement of symptoms.     Neck and shoulder tight aching. For several months, feels tension.    HA: c/o headaches for over past 20 years. states "they are always different, varying locations" Notes triggers sinus, allergies, hunger and sometimes with migrainous (temporal ha). Sensitivity light as noted above. She takes OTC NSAIDs with relief. She expresses frustration about her chronic health issues and headaches. Feels nothing is being done.  Reports trying several meds in the past,. Past imaging (CT) at .     Asthma: Using albuterol several times a week. Always feels congested. Needs refills of nasal sprays. Never received xyzal prescribed in Dec by PCP. Tried singular but noted mood changes. Non adherence with Advair       Past Medical History:   Diagnosis Date    Abnormal Pap smear of cervix     Allergic rhinitis due to allergen     Asthma at age 4     Social History     Tobacco Use    Smoking status: Never Smoker    Smokeless tobacco: Never Used   Substance Use Topics    Alcohol use: Yes     Comment: occ    Drug use: No     Review of patient's allergies indicates:  No Known Allergies    Review of Systems    Answers for HPI/ROS submitted by the patient on 7/18/2022  activity change: Yes  unexpected weight change: No  neck pain: Yes  hearing loss: No  rhinorrhea: No  trouble swallowing: No  eye discharge: No  visual disturbance: Yes  chest tightness: Yes (not new, relates to " "her asthma)  wheezing: No  chest pain: Yes (not new, relates to her asthma)  palpitations: Yes  blood in stool: No  constipation: No  vomiting: No  diarrhea: No  polydipsia: No  polyuria: Yes  difficulty urinating: No  hematuria: No  dysuria: No  joint swelling: No  arthralgias: No  headaches: Yes  confusion: No  dysphoric mood: No  Objective: BP (!) 140/80 (BP Location: Right arm, Patient Position: Sitting, BP Method: Large (Manual))   Pulse 85   Ht 5' 6" (1.676 m)   Wt 99.7 kg (219 lb 12.8 oz)   LMP 04/27/2015   SpO2 99%   BMI 35.48 kg/m²         Physical Exam  Vitals reviewed.   Constitutional:       General: She is not in acute distress.     Appearance: She is well-developed.   HENT:      Head: Normocephalic and atraumatic.      Right Ear: Tympanic membrane, ear canal and external ear normal.      Left Ear: Tympanic membrane, ear canal and external ear normal.      Nose: Congestion present.      Right Sinus: Maxillary sinus tenderness present.   Cardiovascular:      Rate and Rhythm: Normal rate and regular rhythm.      Heart sounds: No murmur heard.  Pulmonary:      Effort: Pulmonary effort is normal.      Breath sounds: Normal breath sounds. No wheezing or rales.   Abdominal:      General: Bowel sounds are normal.      Palpations: Abdomen is soft.      Tenderness: There is no abdominal tenderness.   Musculoskeletal:      Cervical back: Pain with movement and muscular tenderness present. No spinous process tenderness. Decreased range of motion.   Skin:     General: Skin is warm and dry.      Findings: No rash.   Neurological:      Mental Status: She is alert.         Assessment:       Problem List Items Addressed This Visit        Neuro    Migraine without aura and without status migrainosus, not intractable    Relevant Orders    Ambulatory referral/consult to Neurology       Pulmonary    Mild intermittent asthma with allergic rhinitis without complication    Relevant Medications    levocetirizine (XYZAL) " 5 MG tablet    fluticasone propionate (FLONASE) 50 mcg/actuation nasal spray    azelastine (ASTELIN) 137 mcg (0.1 %) nasal spray      Other Visit Diagnoses     Eye pain, bilateral    -  Primary    Relevant Orders    Ambulatory referral/consult to Ophthalmology    Neck and shoulder pain        Relevant Medications    tiZANidine (ZANAFLEX) 4 MG tablet    Tension headache        Relevant Medications    tiZANidine (ZANAFLEX) 4 MG tablet    Other Relevant Orders    Ambulatory referral/consult to Physical/Occupational Therapy    Ambulatory referral/consult to Neurology    Allergic sinusitis        Relevant Medications    levocetirizine (XYZAL) 5 MG tablet    fluticasone propionate (FLONASE) 50 mcg/actuation nasal spray    azelastine (ASTELIN) 137 mcg (0.1 %) nasal spray          Plan:         Cristina was seen today for follow-up, headache and shoulder pain.    Diagnoses and all orders for this visit:    Eye pain, bilateral  -     Ambulatory referral/consult to Ophthalmology; Future    Neck and shoulder pain       -     Ambulatory referral/consult to Physical/Occupational Therapy; Future  -     tiZANidine (ZANAFLEX) 4 MG tablet; Take 1 tablet (4 mg total) by mouth nightly as needed. (side effect profile discussed)    Tension headache  -     Ambulatory referral/consult to Physical/Occupational Therapy; Future  -     tiZANidine (ZANAFLEX) 4 MG tablet; Take 1 tablet (4 mg total) by mouth nightly as needed.  -     Ambulatory referral/consult to Neurology; Future    Mild intermittent asthma with allergic rhinitis without complication  Restart nasal sprays  Discusses importance of daily maintenance inhaler use   Trial of xyzal  -     levocetirizine (XYZAL) 5 MG tablet; Take 1 tablet (5 mg total) by mouth every evening.  -     fluticasone propionate (FLONASE) 50 mcg/actuation nasal spray; 1 spray (50 mcg total) by Each Nostril route once daily.  -     azelastine (ASTELIN) 137 mcg (0.1 %) nasal spray; 1 spray (137 mcg total) by  Nasal route 2 (two) times daily.    Allergic sinusitis  -     levocetirizine (XYZAL) 5 MG tablet; Take 1 tablet (5 mg total) by mouth every evening.  -     fluticasone propionate (FLONASE) 50 mcg/actuation nasal spray; 1 spray (50 mcg total) by Each Nostril route once daily.  -     azelastine (ASTELIN) 137 mcg (0.1 %) nasal spray; 1 spray (137 mcg total) by Nasal route 2 (two) times daily.    Migraine without aura and without status migrainosus, not intractable  -     Ambulatory referral/consult to Neurology; Future      Future Appointments   Date Time Provider Department Center   7/27/2022  9:15 AM Caty Gracia OD [facility] OPTOMTY [place]   8/1/2022  5:15 PM Fady Bo PT [facility]   8/8/2022  8:00 AM Josh Che MD [facility] NEURO [facility]   9/6/2022  3:15 PM Isaias Jovel MD [id number]         F/u in 1 month with PCP or me ( if PCP unavailable)    Saadia Strong PA-C

## 2022-07-19 NOTE — TELEPHONE ENCOUNTER
----- Message from Giovanna An sent at 7/19/2022  9:16 AM CDT -----  Contact: Cristina @[phone]  Pt needs an ER fu visit for eye pain

## 2022-08-01 ENCOUNTER — CLINICAL SUPPORT (OUTPATIENT)
Dept: REHABILITATION | Facility: HOSPITAL | Age: 42
End: 2022-08-01
Payer: COMMERCIAL

## 2022-08-01 DIAGNOSIS — G44.209 TENSION HEADACHE: ICD-10-CM

## 2022-08-01 DIAGNOSIS — M62.9 MUSCULOSKELETAL DISORDER INVOLVING UPPER TRAPEZIUS MUSCLE: Primary | ICD-10-CM

## 2022-08-01 DIAGNOSIS — R29.3 BAD POSTURE: ICD-10-CM

## 2022-08-01 PROCEDURE — 97110 THERAPEUTIC EXERCISES: CPT

## 2022-08-01 PROCEDURE — 97161 PT EVAL LOW COMPLEX 20 MIN: CPT

## 2022-08-01 NOTE — PLAN OF CARE
[facility]   Physical Therapy Initial Evaluation      Date: 8/1/2022   Name: Cristina Man  Clinic Number: [id number]    Therapy Diagnosis:   Encounter Diagnoses   Name Primary?    Tension headache     Musculoskeletal disorder involving upper trapezius muscle Yes    Bad posture      Physician: Saadia Strong PA-C    Physician Orders: PT Eval and Treat   Medical Diagnosis from Referral: G44.209 (ICD-10-CM) - Tension headache  Evaluation Date: 8/1/2022  Authorization Period Expiration: TBD  Plan of Care Expiration: 10/28/2022  Progress Note Due: 9/1/2022  Visit # / Visits authorized: 1/ 1   FOTO: 1/5    Precautions: Standard     Time In: 510  Time Out: 600  Total Appointment Time (timed & untimed codes): 50 minutes      SUBJECTIVE     Date of onset: 2-3 months     History of current condition - Cristina reports: she started to have increased tension in her shoulder and neck bilaterally. Patient reports she feels that it may be coming from stress. Patient reports she has also been dealing with eye pressure and sinus issues for several years. 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 Patient is not sure if this type of work is making her symptoms worse. Patient reports she seldomly gets pain down the arms. Patient reports her pain is mainly in her neck and shoulders.       Imaging, none:     Prior Therapy: Yes  Occupation: Reads water   Prior Level of Function: No change   Current Level of Function: Work and ADLS     Pain:  Current 5/10, worst 7/10, best 2/10   Location: bilateral neck  and shoulder  .   Description: Aching and Throbbing  Aggravating Factors: Stress, stooping down   Easing Factors: Slow down activity, massage    Patients goals: To reduce neck and shoulder pain       Medical History:   Past Medical History:   Diagnosis Date    Abnormal Pap smear of cervix     Allergic rhinitis due to allergen     Asthma at age 4       Surgical History:   Cristina Man  has a  past surgical history that includes Hemorrhoid surgery (2009); Hysterectomy (2015); and Reduction of both breasts (Bilateral, 2020).    Medications:   Cristina has a current medication list which includes the following prescription(s): albuterol, amlodipine, azelastine, ergocalciferol, esomeprazole, fluticasone propionate, fluticasone-salmeterol 250-50 mcg/dose, ibuprofen, levocetirizine, ondansetron, and tolterodine, and the following Facility-Administered Medications: EPINEPHrine 1,000 mcg in lactated Ringers 1,000 mL irrigation and EPINEPHrine 1,000 mcg in lactated Ringers 1,000 mL irrigation.    Allergies:   Review of patient's allergies indicates:  No Known Allergies       OBJECTIVE     Cervical AROM: Pain/Dysfunction with Movement:   Flexion: 40 degrees Aching in middle of back    Extension: 55 degrees Tension in middle of neck/back    Right side bendin degrees    Left side bendin degrees    Right rotation: WNL degrees    Left rotation: WNL degrees      Myotome Right Left Pain/Dysfunction with Movement   C4- Shoulder Elevation 5/5 5/5    C5- Shoulder Abduction 5/5 5/5    C6- Wrist Extension 5/5 5/5    C7- Elbow Extension 5/5 5/5    C8- Phalangeal Abduction 5/5 5/5    T1- 5th Finger Abduction 5/5 5/5      Posture Assessment: Slumped w/ forward head posture     Palpation: Hypertonicity of upper traps, mid traps, levator              Limitation/Restriction for FOTO Neck Survey    Therapist reviewed FOTO scores for Cristina Man on 2022.   FOTO documents entered into [employer] - see Media section.    Limitation Score: 37%         TREATMENT     Total Treatment time (time-based codes) separate from Evaluation: 15 minutes      Cristina received the treatments listed below:      therapeutic exercises to develop flexibility for 15 minutes including:  UT Stretch  LV Stretch   Mid Trap Stretch         PATIENT EDUCATION AND HOME EXERCISES     Education provided:   - HEP    Written Home Exercises Provided: yes.  Exercises were reviewed and Cristina was able to demonstrate them prior to the end of the session.  Cristina demonstrated good  understanding of the education provided. See EMR under Patient Instructions for exercises provided during therapy sessions.    ASSESSMENT     Cristina is a 42 y.o. female referred to outpatient Physical Therapy with a medical diagnosis of tension headaches. Patient presents with a history of neck/shoulder pain and headaches. This is a chronic condition that may be induced from her job and stress. Patient presents with increased tension in upper traps, levator, and mid traps. Patient did well with initiation of therex today.     Patient prognosis is Good.   Patient will benefit from skilled outpatient Physical Therapy to address the deficits stated above and in the chart below, provide patient /family education, and to maximize patientt's level of independence.     Plan of care discussed with patient: Yes  Patient's spiritual, cultural and educational needs considered and patient is agreeable to the plan of care and goals as stated below:     Anticipated Barriers for therapy: Chronic Symptom     Medical Necessity is demonstrated by the following  History  Co-morbidities and personal factors that may impact the plan of care Co-morbidities:   high BMI    Personal Factors:   no deficits     low   Examination  Body Structures and Functions, activity limitations and participation restrictions that may impact the plan of care Body Regions:   neck  back    Body Systems:    ROM    Participation Restrictions:   None    Activity limitations:   Learning and applying knowledge  no deficits    General Tasks and Commands  no deficits    Communication  no deficits    Mobility  no deficits    Self care  no deficits    Domestic Life  no deficits    Interactions/Relationships  no deficits    Life Areas  no deficits    Community and Social Life  no deficits         low   Clinical Presentation stable and  uncomplicated low   Decision Making/ Complexity Score: low     Goals:  Short Term Goals (3 Weeks):   1. Pt will be independent with HEP to supplement PT in improving pain free cervical mobility  2. Pt will improve cervical AROM 5 deg in all planes to improve cervical mobility for driving  3. Pt will demonstrate improved sitting posture to decrease pain experienced in head and neck.  Long Term Goals (6 Weeks):   1. Pt will improve FOTO to </=30% limitation to improve perceived limitation with changing and maintaining mobility.  2. Pt will improve cervical AROM to WNL in all planes to improve cervical mobility for driving   3. Pt will improve UE MMTs 1 grade in all planes to improve strength for lifting and carrying tasks.  4. Pt will report no pain with cervical AROM in all planes to promote QOL.      PLAN   Plan of care Certification: 8/1/2022 to 10/28/2022.    Outpatient Physical Therapy 2 times weekly for 6 weeks to include the following interventions: Cervical/Lumbar Traction, Manual Therapy, Moist Heat/ Ice, Neuromuscular Re-ed, Patient Education, Self Care, Therapeutic Activities, Therapeutic Exercise and FDN.     Fady Bo, PT      I CERTIFY THE NEED FOR THESE SERVICES FURNISHED UNDER THIS PLAN OF TREATMENT AND WHILE UNDER MY CARE   Physician's comments:     Physician's Signature: ___________________________________________________

## 2022-09-06 ENCOUNTER — OFFICE VISIT (OUTPATIENT)
Dept: OBSTETRICS AND GYNECOLOGY | Facility: CLINIC | Age: 42
End: 2022-09-06
Attending: OBSTETRICS & GYNECOLOGY
Payer: COMMERCIAL

## 2022-09-06 VITALS
SYSTOLIC BLOOD PRESSURE: 136 MMHG | HEIGHT: 66 IN | DIASTOLIC BLOOD PRESSURE: 88 MMHG | WEIGHT: 218.06 LBS | BODY MASS INDEX: 35.04 KG/M2

## 2022-09-06 DIAGNOSIS — Z01.419 WOMEN'S ANNUAL ROUTINE GYNECOLOGICAL EXAMINATION: Primary | ICD-10-CM

## 2022-09-06 DIAGNOSIS — Z90.710 HISTORY OF HYSTERECTOMY: ICD-10-CM

## 2022-09-06 DIAGNOSIS — R35.0 URINARY FREQUENCY: ICD-10-CM

## 2022-09-06 DIAGNOSIS — Z12.4 PAP SMEAR FOR CERVICAL CANCER SCREENING: ICD-10-CM

## 2022-09-06 DIAGNOSIS — Z12.31 VISIT FOR SCREENING MAMMOGRAM: ICD-10-CM

## 2022-09-06 PROCEDURE — 99999 PR PBB SHADOW E&M-EST. PATIENT-LVL III: ICD-10-PCS | Mod: PBBFAC,,, | Performed by: OBSTETRICS & GYNECOLOGY

## 2022-09-06 PROCEDURE — 87624 HPV HI-RISK TYP POOLED RSLT: CPT | Performed by: OBSTETRICS & GYNECOLOGY

## 2022-09-06 PROCEDURE — 99999 PR PBB SHADOW E&M-EST. PATIENT-LVL III: CPT | Mod: PBBFAC,,, | Performed by: OBSTETRICS & GYNECOLOGY

## 2022-09-06 PROCEDURE — 99396 PREV VISIT EST AGE 40-64: CPT | Mod: S$GLB,,, | Performed by: OBSTETRICS & GYNECOLOGY

## 2022-09-06 PROCEDURE — 88175 CYTOPATH C/V AUTO FLUID REDO: CPT | Performed by: OBSTETRICS & GYNECOLOGY

## 2022-09-06 PROCEDURE — 87086 URINE CULTURE/COLONY COUNT: CPT | Performed by: OBSTETRICS & GYNECOLOGY

## 2022-09-06 PROCEDURE — 99396 PR PREVENTIVE VISIT,EST,40-64: ICD-10-PCS | Mod: S$GLB,,, | Performed by: OBSTETRICS & GYNECOLOGY

## 2022-09-06 NOTE — PROGRESS NOTES
Cristina Man is a 42 y.o. female  who presents for annual exam.  S/P TLH .  Denies bleeding and flashes / sweats.  Over the past year, she finds that urinary urgency / frequency has significantly increased.  She is now voiding 5-6 times during the night as well as multiple times during the day.  Denies dysuria, fever, back pain.  Also, she notes occasional loss of urine with cough / sneeze.     Patient's last menstrual period was 2015.    Past Medical History:   Diagnosis Date    Abnormal Pap smear of cervix     Allergic rhinitis due to allergen     Asthma at age 4       Past Surgical History:   Procedure Laterality Date    HEMORRHOID SURGERY      HYSTERECTOMY      REDUCTION OF BOTH BREASTS Bilateral 2020    Procedure: MAMMOPLASTY, REDUCTION, BILATERAL;  Surgeon: Marvin Levine MD;  Location: [facility];  Service: Plastics;  Laterality: Bilateral;       OB History          2    Para   2    Term   2            AB        Living             SAB        IAB        Ectopic        Multiple        Live Births                     ROS:  GENERAL: Feeling well overall.   SKIN: Denies rash or lesions.   HEAD: Denies head injury or headache.   NODES: Denies enlarged lymph nodes.   CHEST: Denies chest pain or shortness of breath.   CARDIOVASCULAR: Denies palpitations or left sided chest pain.   ABDOMEN: No abdominal pain, nausea, vomiting or rectal bleeding.   URINARY:  Reports urinary frequency and urgency.  No dysuria or hematuria.  REPRODUCTIVE: See HPI.   BREASTS: Denies pain, lumps, or nipple discharge.   HEMATOLOGIC: No easy bruisability or excessive bleeding.   MUSCULOSKELETAL: Denies joint pain or swelling.   NEUROLOGIC: Denies syncope or weakness.   PSYCHIATRIC: Denies depression.    PE:   (chaperone present during entire exam)  APPEARANCE: Well nourished, well developed, in no acute distress.  BREASTS: Symmetrical.  Bilateral breast reduction.  No palpable masses, nipple  discharge or adenopathy bilaterally.  ABDOMEN: Soft. No tenderness or masses. No CVA tenderness.  VULVA: No lesions. Normal female genitalia.  URETHRAL MEATUS: Normal size and location, no lesions, no prolapse.  URETHRA: No masses, tenderness, prolapse or scarring.  VAGINA: No lesions, no abnormal discharge, no significant cystocele or rectocele.  CERVIX / UTERUS: Surgically absent.  ADNEXA: No masses, tenderness or CDS nodularity.  ANUS PERINEUM: Normal.      Diagnosis:  1. Women's annual routine gynecological examination    2. History of hysterectomy    3. Pap smear for cervical cancer screening    4. Urinary frequency    5. Visit for screening mammogram          PLAN:    Orders Placed This Encounter    Urine culture    HPV High Risk Genotypes, PCR    Ambulatory referral/consult to Urogynecology    Liquid-Based Pap Smear, Screening       Patient was counseled today on the need for annual gyn exams.  We also discussed her increase urinary frequency / urgency.  Urine will be sent for and she will have a consult with GYN-Urology.  Mammogram is scheduled for 09/27/2022.    Follow-up in 1 year.

## 2022-09-09 ENCOUNTER — PATIENT MESSAGE (OUTPATIENT)
Dept: OBSTETRICS AND GYNECOLOGY | Facility: CLINIC | Age: 42
End: 2022-09-09
Payer: COMMERCIAL

## 2022-09-09 LAB — BACTERIA UR CULT: NO GROWTH

## 2022-09-10 ENCOUNTER — PATIENT MESSAGE (OUTPATIENT)
Dept: OBSTETRICS AND GYNECOLOGY | Facility: CLINIC | Age: 42
End: 2022-09-10
Payer: COMMERCIAL

## 2022-09-10 LAB
CLINICAL INFO: NORMAL
CYTO CVX: NORMAL
CYTOLOGIST CVX/VAG CYTO: NORMAL
CYTOLOGIST CVX/VAG CYTO: NORMAL
CYTOLOGY CMNT CVX/VAG CYTO-IMP: NORMAL
CYTOLOGY PAP THIN PREP EXPLANATION: NORMAL
DATE OF PREVIOUS PAP: NORMAL
DATE PREVIOUS BX: YES
GEN CATEG CVX/VAG CYTO-IMP: NORMAL
HPV E6+E7 MRNA CVX QL NAA+PROBE: NOT DETECTED
LMP START DATE: NORMAL
MICROORGANISM CVX/VAG CYTO: NORMAL
PATHOLOGIST CVX/VAG CYTO: NORMAL
SERVICE CMNT-IMP: NORMAL
SPECIMEN SOURCE CVX/VAG CYTO: NORMAL
STAT OF ADQ CVX/VAG CYTO-IMP: NORMAL

## 2022-09-16 ENCOUNTER — PATIENT MESSAGE (OUTPATIENT)
Dept: UROGYNECOLOGY | Facility: CLINIC | Age: 42
End: 2022-09-16
Payer: COMMERCIAL

## 2022-09-20 ENCOUNTER — OFFICE VISIT (OUTPATIENT)
Dept: UROGYNECOLOGY | Facility: CLINIC | Age: 42
End: 2022-09-20
Attending: OBSTETRICS & GYNECOLOGY
Payer: COMMERCIAL

## 2022-09-20 VITALS
BODY MASS INDEX: 35.61 KG/M2 | HEIGHT: 66 IN | SYSTOLIC BLOOD PRESSURE: 137 MMHG | DIASTOLIC BLOOD PRESSURE: 66 MMHG | WEIGHT: 221.56 LBS | HEART RATE: 90 BPM

## 2022-09-20 DIAGNOSIS — N95.2 ATROPHIC VAGINITIS: ICD-10-CM

## 2022-09-20 DIAGNOSIS — N39.46 MIXED URGE AND STRESS INCONTINENCE: ICD-10-CM

## 2022-09-20 DIAGNOSIS — N94.10 DYSPAREUNIA, FEMALE: Primary | ICD-10-CM

## 2022-09-20 DIAGNOSIS — R35.0 URINARY FREQUENCY: ICD-10-CM

## 2022-09-20 PROCEDURE — 87086 URINE CULTURE/COLONY COUNT: CPT | Performed by: OBSTETRICS & GYNECOLOGY

## 2022-09-20 PROCEDURE — 99214 PR OFFICE/OUTPT VISIT, EST, LEVL IV, 30-39 MIN: ICD-10-PCS | Mod: S$GLB,,, | Performed by: OBSTETRICS & GYNECOLOGY

## 2022-09-20 PROCEDURE — 99999 PR PBB SHADOW E&M-EST. PATIENT-LVL V: ICD-10-PCS | Mod: PBBFAC,,, | Performed by: OBSTETRICS & GYNECOLOGY

## 2022-09-20 PROCEDURE — 99214 OFFICE O/P EST MOD 30 MIN: CPT | Mod: S$GLB,,, | Performed by: OBSTETRICS & GYNECOLOGY

## 2022-09-20 PROCEDURE — 99999 PR PBB SHADOW E&M-EST. PATIENT-LVL V: CPT | Mod: PBBFAC,,, | Performed by: OBSTETRICS & GYNECOLOGY

## 2022-09-20 NOTE — PROGRESS NOTES
Subjective:       Patient ID: Cristina Man is a 42 y.o. female.    Chief Complaint:  Urinary Frequency      History of Present Illness  HPI 42 Y O F  has a past medical history of Abnormal Pap smear of cervix, Allergic rhinitis due to allergen, and Asthma (at age 4). Refereed for urinary frequency.     [facility]    Question 9/20/2022  3:30 PM CDT - Filed by Anita Boswell MA   General Urogynecology: Are you experiencing the following?    Dysuria (painful urination) No   Nocturia:  waking up at night to empty your bladder  Yes   If you answered yes to the previous question, how many times does this happen per night? 3-4   Enuresis (urine loss during sleep) No   Dribbling urine after you urinate Yes   Hematuria (urine appears red) No   Type of stream Strong   Urinary frequency: How often a day are you going to the bathroom per day?  More than 15   Urinary Tract Infections: How many Urinary Tract Infections have you had in the past year? I have not had a UTI in the past year   If you have had a UTI in the past year, what treatments have you had so far?  I have not had a UTI in the past year   Urinary Incontinence (General): Are you experiencing the following?    Past consultation for incontinence: Have you ever seen someone for the evaluation of incontinence? No   If you answered yes to the previous question, please select all the therapies you have tried.  N/a- I answered no to the previous question   Please note the effectiveness of the therapies.    Need to wear protection to keep clothes dry  Yes   If you answered yes to the previous question, please nixon the protection you use.  Panty liner   If you wear protection, how much wetness is typically on each pad? Light   If you wear protection, how often do you have to change per day, if applicable?  5-6   Stress Symptoms: Are you experiencing the following?    Leakage of urine with cough, laugh and/or sneeze Yes   If you answered yes to the previous  "question, what is the frequency in days, weeks and/or months? Several times a day   Leakage of urine with sex No   Leakage of urine with bending/ lifting Yes   Leakage of urine with briskly walking or jogging No   If you lose urine for any other reason not previously mentioned, please note it below, if applicable.     Urge Symptoms: Are you experiencing the following?    Urgency ("got to go" feeling) Yes   Urge: How frequently do you feel an urge to urinate (feeling like you "gotta go" to the bathroom and can't wait) Several times a month   Do you experience a leakage of urine when you have a feeling of urgency?  Yes   Leakage of urine when unaware No   Past use of anticholinergics (medications used to treat overactive bladder) No   If you answered yes to the previous question, please nixon the anticholinergics you have used:     Have you ever used Mirbetriq (aka Mirabegron)?  No   Prolapse Symptoms: Are you experiencing any of the following?     Falling out/ Bulging/ Heaviness in the vagina No   Vaginal/ Abdominal Pain/ Pressure Yes   Need to strain/ Push to void No   Need to wait on the toilet before you void No   Unusual position to urinate (using your hands to push back the vaginal bulge) No   Sensation of incomplete emptying Yes   Past use of pessary device No   If you answered yes to the previous question, please list the devices you have used below.     Bowel Symptoms: Are you experiencing any of the following?    Constipation No   Diarrhea  No   Hematochezia (bloody stool) No   Incomplete evacuation of stool No   Involuntary loss of formed stool No   Fecal smearing/urgency No   Involuntary loss of gas No   Vaginal Symptoms: Are you experiencing any of the following?     Abnormal vaginal bleeding  No   Vaginal dryness No   Sexually active  No   Dyspareunia (painful intercourse) No   Estrogen use  No       GYN & OB History  Patient's last menstrual period was 04/27/2015.   Date of Last Pap: No result " found    OB History    Para Term  AB Living   2 2 2         SAB IAB Ectopic Multiple Live Births                  # Outcome Date GA Lbr Orion/2nd Weight Sex Delivery Anes PTL Lv   2 Term            1 Term              OB     x 2.  C/s x 0.    Largest: 8 # 0 oz       GYN  Menarche:  11  Menstrual cycle:   Menopause:    Hysterectomy:  Laparoscopic: indication: Menorrhagia   Ovaries:  present   Tubal ligation: NO   Other abdominal surgeries:     Review of Systems  Review of Systems   Constitutional: Negative.  Negative for activity change, appetite change, chills, diaphoresis, fatigue, fever and unexpected weight change.   HENT: Negative.     Eyes: Negative.    Respiratory: Negative.  Negative for apnea, cough and wheezing.    Cardiovascular: Negative.  Negative for chest pain and palpitations.   Gastrointestinal:  Negative for abdominal distention, abdominal pain, anal bleeding, blood in stool, constipation, diarrhea, nausea, rectal pain and vomiting.   Endocrine: Negative.    Genitourinary:  Positive for frequency and urgency. Negative for decreased urine volume, difficulty urinating, dyspareunia, dysuria, enuresis, flank pain, genital sores, hematuria, menstrual problem, pelvic pain, vaginal bleeding, vaginal discharge and vaginal pain.   Musculoskeletal:  Negative for back pain and gait problem.   Skin:  Negative for color change, pallor, rash and wound.   Allergic/Immunologic: Negative for immunocompromised state.   Neurological: Negative.  Negative for dizziness and speech difficulty.   Hematological:  Negative for adenopathy.   Psychiatric/Behavioral:  Negative for agitation, behavioral problems, confusion and sleep disturbance.          Objective:     Physical Exam   Constitutional: She is oriented to person, place, and time. She appears well-developed.   HENT:   Head: Normocephalic and atraumatic.   Eyes: Conjunctivae and EOM are normal.   Cardiovascular: Normal rate, regular rhythm, S1 normal,  S2 normal, normal heart sounds and intact distal pulses.   Pulmonary/Chest: Effort normal and breath sounds normal. She exhibits no tenderness.   Abdominal: Soft. Bowel sounds are normal. She exhibits no distension and no mass. There is no splenomegaly or hepatomegaly. There is no abdominal tenderness. There is no rigidity, no rebound and no guarding. Hernia confirmed negative in the right inguinal area and confirmed negative in the left inguinal area.   Genitourinary: Pelvic exam was performed with patient supine. Rectum normal, vagina normal, skenes normal and bartholins normal. Right labia normal and left labia normal. Urethra exhibits hypermobility. Urethra exhibits no urethral caruncle, no urethral diverticulum and no urethral mass. Right bartholin is not enlarged and not tender. Left bartholin is not enlarged and not tender. Rectal exam shows resting tone normal and active tone normal. Rectal exam shows no external hemorrhoid, no fissure, no tenderness, anal tone normal and no dovetailing. Guaiac negative stool. There is atrophy and lavator tenderness in the vagina. No foreign body, tenderness, bleeding, unspecified prolapse of vaginal walls, fistula or mesh exposure in the vagina. Right adnexum displays no tenderness. Left adnexum displays no tenderness. Uterus is absent.   PVR: 75 ML  Empty cough stress test: Negative.  Kegel: 2/5    POP-Q  Aa: -2 Ba: -2 C: -5   GH: 3 PB: 2 TVL: 10   Ap: -2 Bp: -2 D:                      Musculoskeletal:         General: Normal range of motion.      Cervical back: Normal range of motion and neck supple.   Neurological: She is alert and oriented to person, place, and time. She has normal strength and normal reflexes. Cranial nerves II through XII intact. No cranial nerve deficit.   Skin: Skin is warm and dry.   Psychiatric: She has a normal mood and affect. Her speech is normal and behavior is normal. Judgment normal.          HCM  Pap's: no recent- hysterectomy 2015   Mammo:  BIRADS: 1 ; Last imaging  2021   Colonoscopy: N/a:   Dexa:  n/a        Assessment:        1. Dyspareunia, female    2. Urinary frequency    3. Atrophic vaginitis    4. Mixed urge and stress incontinence                Plan:       1. Mixed urinary incontinence, urge > stress:   --Bladder diary for 3-7 days, write the number of times you go to the rest room and associated symptoms of urgency or leakage.   --Empty bladder every 3 hours.  Empty well: wait a minute, lean forward on toilet.    --Avoid dietary irritants (see sheet).  Keep diary x 3-5 days to determine your irritants.  --KEGELS: do 10 in AM and 10 in PM, holding each x 10 seconds.  When you feel urge to go, STOP, KEGEL, and when urge has passed, then go to bathroom.  Start pelvic floor PT.     --URGE:  Myrbetriq 25 mg daily.  SE profile reviewed.    Takes 2-4 weeks to see if will have effect.  For dry mouth: get sour, sugar free lozenge or gum.    --STRESS:  Non surgical options: Pessary vs. Impressa vs Rivive - which represent urethral and bladder support devices; Surgical options including 1.  mid urethral sling; retropubic, transobturator vs single incision 2. Fascial slings 3. Sexton procedure 4. Periurethral bulking     2. --Nocturia (nighttime urination): stop fluids 2 hours before bed.  If have leg swelling:  Elevate feet above chest x 1 hour before bed to get excess fluid off.  Can also use support hose (knee highs).       3. Vaginal atrophy (dryness):  Use 1 gram of estrogen cream in vagina nightly x 2 weeks, then twice a week thereafter.      4. Dyspareunia:  --Long discussion about possible causes for the pain, suspect  levator ani origin   --Start Pelvic floor therapy with    -- Start 10 mg Elavil daily, may need to increase in the future, SE profile discussed   --Keep pelvic pain diary, detail when the pain is felt and where the pain extends to  --Discussed liberal use of lubrication and digitation prior to attempted penetration        Approximately 35 minutes of total time spent in consult, 75 % in discussion. This includes face to face time and non-face to face time preparing to see the patient, obtaining and/or reviewing separately obtained history, documenting clinical information in the electronic or other health record, independently interpreting results (not separately reported) and communicating results to the patient/family/caregiver, or care coordination (not separately reported).        Thank you for requesting consultation of your patient.  I look forward to participating in her care.    Celsa Huerta DO  Female Pelvic Medicine and Reconstructive Surgery  [facility] [place], [place]

## 2022-09-20 NOTE — PATIENT INSTRUCTIONS
1. Mixed urinary incontinence, urge > stress:   --Bladder diary for 3-7 days, write the number of times you go to the rest room and associated symptoms of urgency or leakage.   --Empty bladder every 3 hours.  Empty well: wait a minute, lean forward on toilet.    --Avoid dietary irritants (see sheet).  Keep diary x 3-5 days to determine your irritants.  --KEGELS: do 10 in AM and 10 in PM, holding each x 10 seconds.  When you feel urge to go, STOP, KEGEL, and when urge has passed, then go to bathroom.  Start pelvic floor PT.     --URGE:  Myrbetriq 25 mg daily.  SE profile reviewed.    Takes 2-4 weeks to see if will have effect.  For dry mouth: get sour, sugar free lozenge or gum.    --STRESS:  Non surgical options: Pessary vs. Impressa vs Rivive - which represent urethral and bladder support devices; Surgical options including 1.  mid urethral sling; retropubic, transobturator vs single incision 2. Fascial slings 3. Sexton procedure 4. Periurethral bulking     2. --Nocturia (nighttime urination): stop fluids 2 hours before bed.  If have leg swelling:  Elevate feet above chest x 1 hour before bed to get excess fluid off.  Can also use support hose (knee highs).       3. Vaginal atrophy (dryness):  Use REPLENS or REFRESH OTC: 1/2 applicator full in vagina twice a week.      4. Dyspareunia:  --Long discussion about possible causes for the pain, suspect  levator ani origin   --Start Pelvic floor therapy with    -- Consider Elavil in the future, SE profile discussed   --Keep pelvic pain diary, detail when the pain is felt and where the pain extends to  --Discussed liberal use of lubrication and digitation prior to attempted penetration

## 2022-09-21 LAB — BACTERIA UR CULT: NO GROWTH

## 2022-09-23 ENCOUNTER — PATIENT MESSAGE (OUTPATIENT)
Dept: UROGYNECOLOGY | Facility: CLINIC | Age: 42
End: 2022-09-23
Payer: COMMERCIAL

## 2022-09-27 ENCOUNTER — HOSPITAL ENCOUNTER (OUTPATIENT)
Dept: RADIOLOGY | Facility: OTHER | Age: 42
Discharge: HOME | End: 2022-09-27
Attending: INTERNAL MEDICINE
Payer: COMMERCIAL

## 2022-09-27 DIAGNOSIS — Z12.31 SCREENING MAMMOGRAM, ENCOUNTER FOR: ICD-10-CM

## 2022-09-27 PROCEDURE — 77067 SCR MAMMO BI INCL CAD: CPT | Mod: TC

## 2022-09-27 PROCEDURE — 77063 MAMMO DIGITAL SCREENING BILAT WITH TOMO: ICD-10-PCS | Mod: 26,,, | Performed by: RADIOLOGY

## 2022-09-27 PROCEDURE — 77067 MAMMO DIGITAL SCREENING BILAT WITH TOMO: ICD-10-PCS | Mod: 26,,, | Performed by: RADIOLOGY

## 2022-09-27 PROCEDURE — 77063 BREAST TOMOSYNTHESIS BI: CPT | Mod: 26,,, | Performed by: RADIOLOGY

## 2022-09-27 PROCEDURE — 77067 SCR MAMMO BI INCL CAD: CPT | Mod: 26,,, | Performed by: RADIOLOGY

## 2022-09-28 ENCOUNTER — TELEPHONE (OUTPATIENT)
Dept: UROGYNECOLOGY | Facility: CLINIC | Age: 42
End: 2022-09-28
Payer: COMMERCIAL

## 2022-09-28 NOTE — TELEPHONE ENCOUNTER
----- Message from Saige Zaidi sent at 9/28/2022  1:10 PM CDT -----  Regarding: Pharmacy call back  Type:  Pharmacy Calling to Clarify an RX    Name of Caller: Anat    Pharmacy Name:[id number] - [place] [address] [place] [facility] [place] [address]    Prescription Name: mirabegron (MYRBETRIQ) 25 mg Tb24 ER tablet    What do they need to clarify?:She states it is covered under her insurance, but is not preferred so it is over $100 for a chapo hs supply. Would like to know if it can be changed to something generic.     Best Call Back Number: [phone]     Additional Information:

## 2022-09-28 NOTE — TELEPHONE ENCOUNTER
Myrbetriq is too expensive for patient, I am going to send patient the myrbetriq assistance number. But could we get an alternative for her?

## 2022-10-02 PROBLEM — N95.2 ATROPHIC VAGINITIS: Status: ACTIVE | Noted: 2022-10-02

## 2022-10-02 PROBLEM — N39.46 MIXED URGE AND STRESS INCONTINENCE: Status: ACTIVE | Noted: 2022-10-02

## 2022-10-02 PROBLEM — R35.0 URINARY FREQUENCY: Status: ACTIVE | Noted: 2022-10-02

## 2022-10-02 PROBLEM — N94.10 DYSPAREUNIA, FEMALE: Status: ACTIVE | Noted: 2022-10-02

## 2022-10-28 ENCOUNTER — PATIENT MESSAGE (OUTPATIENT)
Dept: UROGYNECOLOGY | Facility: CLINIC | Age: 42
End: 2022-10-28
Payer: COMMERCIAL

## 2022-10-28 DIAGNOSIS — R35.0 URINARY FREQUENCY: Primary | ICD-10-CM

## 2022-10-31 ENCOUNTER — TELEPHONE (OUTPATIENT)
Dept: UROGYNECOLOGY | Facility: CLINIC | Age: 42
End: 2022-10-31
Payer: COMMERCIAL

## 2022-10-31 DIAGNOSIS — R35.0 URINARY FREQUENCY: Primary | ICD-10-CM

## 2022-10-31 RX ORDER — SOLIFENACIN SUCCINATE 10 MG/1
10 TABLET, FILM COATED ORAL DAILY
Qty: 30 TABLET | Refills: 11 | Status: SHIPPED | OUTPATIENT
Start: 2022-10-31 | End: 2023-12-30

## 2022-10-31 NOTE — TELEPHONE ENCOUNTER
Myrbetriq was too expensive.  Tolteradine did not help.  Will try vesicare 10 mg daily.  Will check urine cutlure. [address]

## 2022-10-31 NOTE — TELEPHONE ENCOUNTER
Pt stated she's experiencing urinary frequency with bladder pressure.Informed pt I've placed a UA/UC order for her to submit a urine specimen to the nearest [facility] lab. Once results are in she'll be notified. Pt also stated the rx for Mrybetriq was too expensive $400 and the Tolterodine did not work for her. Pt asked if the Oxybutynin in which was mention by the pharmacist would help. Pt is requesting a call back directly to discuss further.

## 2022-10-31 NOTE — TELEPHONE ENCOUNTER
----- Message from Sabra Villarreal sent at 10/31/2022  4:14 PM CDT -----  Hello,     Pt called and wanted to speak to the provider about her medication, and pt stated that she is having issues while urinating. Please give pt a call back at: [phone]

## 2022-11-02 ENCOUNTER — OFFICE VISIT (OUTPATIENT)
Dept: UROGYNECOLOGY | Facility: CLINIC | Age: 42
End: 2022-11-02
Payer: COMMERCIAL

## 2022-11-02 ENCOUNTER — LAB VISIT (OUTPATIENT)
Dept: LAB | Facility: OTHER | Age: 42
End: 2022-11-02
Attending: NURSE PRACTITIONER
Payer: COMMERCIAL

## 2022-11-02 VITALS
SYSTOLIC BLOOD PRESSURE: 185 MMHG | BODY MASS INDEX: 38.73 KG/M2 | WEIGHT: 241 LBS | HEART RATE: 88 BPM | HEIGHT: 66 IN | DIASTOLIC BLOOD PRESSURE: 94 MMHG

## 2022-11-02 DIAGNOSIS — R39.9 UTI SYMPTOMS: ICD-10-CM

## 2022-11-02 DIAGNOSIS — N95.2 ATROPHIC VAGINITIS: ICD-10-CM

## 2022-11-02 DIAGNOSIS — R35.0 URINARY FREQUENCY: ICD-10-CM

## 2022-11-02 DIAGNOSIS — N39.46 MIXED URGE AND STRESS INCONTINENCE: Primary | ICD-10-CM

## 2022-11-02 DIAGNOSIS — N94.10 DYSPAREUNIA, FEMALE: ICD-10-CM

## 2022-11-02 LAB
BACTERIA #/AREA URNS HPF: ABNORMAL /HPF
BILIRUB UR QL STRIP: NEGATIVE
CLARITY UR: CLEAR
COLOR UR: YELLOW
GLUCOSE UR QL STRIP: NEGATIVE
HGB UR QL STRIP: ABNORMAL
HYALINE CASTS #/AREA URNS LPF: 0 /LPF
KETONES UR QL STRIP: NEGATIVE
LEUKOCYTE ESTERASE UR QL STRIP: ABNORMAL
MICROSCOPIC COMMENT: ABNORMAL
NITRITE UR QL STRIP: NEGATIVE
PH UR STRIP: 7 [PH] (ref 5–8)
PROT UR QL STRIP: ABNORMAL
RBC #/AREA URNS HPF: 50 /HPF (ref 0–4)
SP GR UR STRIP: 1.02 (ref 1–1.03)
URN SPEC COLLECT METH UR: ABNORMAL
UROBILINOGEN UR STRIP-ACNC: NEGATIVE EU/DL
WBC #/AREA URNS HPF: >100 /HPF (ref 0–5)

## 2022-11-02 PROCEDURE — 99999 PR PBB SHADOW E&M-EST. PATIENT-LVL IV: ICD-10-PCS | Mod: PBBFAC,,, | Performed by: PHYSICIAN ASSISTANT

## 2022-11-02 PROCEDURE — 99999 PR PBB SHADOW E&M-EST. PATIENT-LVL IV: CPT | Mod: PBBFAC,,, | Performed by: PHYSICIAN ASSISTANT

## 2022-11-02 PROCEDURE — 99213 PR OFFICE/OUTPT VISIT, EST, LEVL III, 20-29 MIN: ICD-10-PCS | Mod: S$GLB,,, | Performed by: PHYSICIAN ASSISTANT

## 2022-11-02 PROCEDURE — 87086 URINE CULTURE/COLONY COUNT: CPT | Performed by: NURSE PRACTITIONER

## 2022-11-02 PROCEDURE — 87186 SC STD MICRODIL/AGAR DIL: CPT | Performed by: NURSE PRACTITIONER

## 2022-11-02 PROCEDURE — 87088 URINE BACTERIA CULTURE: CPT | Performed by: NURSE PRACTITIONER

## 2022-11-02 PROCEDURE — 81000 URINALYSIS NONAUTO W/SCOPE: CPT | Performed by: NURSE PRACTITIONER

## 2022-11-02 PROCEDURE — 99213 OFFICE O/P EST LOW 20 MIN: CPT | Mod: S$GLB,,, | Performed by: PHYSICIAN ASSISTANT

## 2022-11-02 PROCEDURE — 87077 CULTURE AEROBIC IDENTIFY: CPT | Performed by: NURSE PRACTITIONER

## 2022-11-02 RX ORDER — ESTRADIOL 0.1 MG/G
0.5 CREAM VAGINAL
Qty: 42.5 G | Refills: 11 | Status: SHIPPED | OUTPATIENT
Start: 2022-11-03

## 2022-11-02 RX ORDER — PHENAZOPYRIDINE HYDROCHLORIDE 200 MG/1
200 TABLET, FILM COATED ORAL 3 TIMES DAILY PRN
Qty: 10 TABLET | Refills: 0 | Status: SHIPPED | OUTPATIENT
Start: 2022-11-02 | End: 2023-05-10

## 2022-11-02 RX ORDER — NITROFURANTOIN 25; 75 MG/1; MG/1
100 CAPSULE ORAL 2 TIMES DAILY
Qty: 10 CAPSULE | Refills: 0 | Status: SHIPPED | OUTPATIENT
Start: 2022-11-02 | End: 2022-11-07

## 2022-11-02 NOTE — PROGRESS NOTES
[facility] - UROGYNECOLOGY  [address] [address]  2022     Cristina Man  [id number]  1980    UROGYN FOLLOW-UP  2022     42 y.o. female,   presents for urogyn follow up. She c/o   Chief Complaint   Patient presents with    intense burning    Dysuria    .     Last HPI from 2022:  42 Y O F  has a past medical history of Abnormal Pap smear of cervix, Allergic rhinitis due to allergen, and Asthma (at age 4). Refereed for urinary frequency    Changes from last visit:  Myrbetriq was too expensive. Tolteradine did not help. Trying vesicare 10 mg daily. 3 days ago started having intense pain with urination, urinary hesitation and dribbling. Patient dropped off urine specimen yesterday and UA is indicative of infection.     Past Medical History:   Diagnosis Date    Abnormal Pap smear of cervix     Allergic rhinitis due to allergen     Asthma at age 4       Past Surgical History:   Procedure Laterality Date    HEMORRHOID SURGERY      HYSTERECTOMY      REDUCTION OF BOTH BREASTS Bilateral 2020    Procedure: MAMMOPLASTY, REDUCTION, BILATERAL;  Surgeon: Marvin Levine MD;  Location: [facility];  Service: Plastics;  Laterality: Bilateral;    TOTAL REDUCTION MAMMOPLASTY         Family History   Problem Relation Age of Onset    Hypertension Mother     Cirrhosis Father         alcoholic    Hypertension Brother     Heart failure Maternal Grandmother     Hypertension Maternal Grandmother     Diabetes Maternal Grandmother     Hypertension Maternal Grandfather         DM    Diabetes Maternal Grandfather     Lung cancer Paternal Grandmother     Hypertension Paternal Grandmother     Diabetes Paternal Grandmother     Breast cancer Neg Hx     Colon cancer Neg Hx     Ovarian cancer Neg Hx        Social History     Socioeconomic History    Marital status:     Number of children: 2   Occupational History    Occupation:       Employer: [employer]   Tobacco Use    Smoking status: Never    Smokeless tobacco: Never   Substance and Sexual Activity    Alcohol use: Yes     Comment: occ    Drug use: No    Sexual activity: Yes     Partners: Male     Birth control/protection: None       Current Outpatient Medications   Medication Sig Dispense Refill    albuterol (PROVENTIL/VENTOLIN HFA) 90 mcg/actuation inhaler Inhale 2 puffs into the lungs every 6 (six) hours as needed for Wheezing. Rescue 18 g 3    amLODIPine (NORVASC) 5 MG tablet Take 1 tablet (5 mg total) by mouth once daily. 90 tablet 3    azelastine (ASTELIN) 137 mcg (0.1 %) nasal spray 1 spray (137 mcg total) by Nasal route 2 (two) times daily. 30 mL 3    ergocalciferol (ERGOCALCIFEROL) 50,000 unit Cap Take 1 capsule (50,000 Units total) by mouth every 7 days. 12 capsule 3    esomeprazole (NEXIUM) 40 MG capsule Take 1 capsule (40 mg total) by mouth before breakfast. 30 capsule 11    fluticasone propionate (FLONASE) 50 mcg/actuation nasal spray 1 spray (50 mcg total) by Each Nostril route once daily. 16 g 3    fluticasone-salmeterol diskus inhaler 250-50 mcg Inhale 1 puff into the lungs 2 (two) times daily. Controller 14 each 3    ibuprofen (ADVIL,MOTRIN) 600 MG tablet Take 1 tablet (600 mg total) by mouth daily as needed for Pain. 14 tablet 0    levocetirizine (XYZAL) 5 MG tablet Take 1 tablet (5 mg total) by mouth every evening. 90 tablet 3    ondansetron (ZOFRAN) 4 MG tablet Take 1 tablet (4 mg total) by mouth every 6 (six) hours as needed for Nausea. 20 tablet 1    solifenacin (VESICARE) 10 MG tablet Take 1 tablet (10 mg total) by mouth once daily. 30 tablet 11    tiZANidine (ZANAFLEX) 4 MG tablet TAKE 1 TABLET(4 MG) BY MOUTH EVERY NIGHT AS NEEDED 20 tablet 0     No current facility-administered medications for this visit.     Facility-Administered Medications Ordered in Other Visits   Medication Dose Route Frequency Provider Last Rate Last Admin    EPINEPHrine 1,000 mcg in  lactated Ringers 1,000 mL irrigation   Irrigation On Call Procedure Marvin Levine MD        EPINEPHrine 1,000 mcg in lactated Ringers 1,000 mL irrigation   Irrigation On Call Procedure Marvin Levine MD           Review of patient's allergies indicates:  No Known Allergies    OB History          2    Para   2    Term   2            AB        Living             SAB        IAB        Ectopic        Multiple        Live Births                      ROS  As per HPI.      Physical Exam  LMP 2015   General: alert and oriented, no acute distress  Respiratory: normal respiratory effort    Pelvic:  deferred    Impression  1. Mixed urge and stress incontinence  phenazopyridine (PYRIDIUM) 200 MG tablet    nitrofurantoin, macrocrystal-monohydrate, (MACROBID) 100 MG capsule    estradioL (ESTRACE) 0.01 % (0.1 mg/gram) vaginal cream    Ambulatory referral/consult to Physical/Occupational Therapy      2. Urinary frequency  phenazopyridine (PYRIDIUM) 200 MG tablet    nitrofurantoin, macrocrystal-monohydrate, (MACROBID) 100 MG capsule    estradioL (ESTRACE) 0.01 % (0.1 mg/gram) vaginal cream    Ambulatory referral/consult to Physical/Occupational Therapy      3. UTI symptoms  phenazopyridine (PYRIDIUM) 200 MG tablet    nitrofurantoin, macrocrystal-monohydrate, (MACROBID) 100 MG capsule    estradioL (ESTRACE) 0.01 % (0.1 mg/gram) vaginal cream      4. Atrophic vaginitis  estradioL (ESTRACE) 0.01 % (0.1 mg/gram) vaginal cream      5. Dyspareunia, female  estradioL (ESTRACE) 0.01 % (0.1 mg/gram) vaginal cream    Ambulatory referral/consult to Physical/Occupational Therapy        We reviewed the above issues and discussed options for short-term versus long-term management of her problems.     Plan:    1. Mixed urinary incontinence, urge > stress:   -- UTI symptoms today, UA +, culture pending from clean catch, will start to treat empirically with Macrobid and pyridium  -- Take pyridium up to 3 times daily for  max of 3 days at a time  -- Bladder diary for 3-7 days, write the number of times you go to the rest room and associated symptoms of urgency or leakage.   -- Empty bladder every 2-3 hours.  Empty well: wait a minute, lean forward on toilet.    -- Avoid dietary irritants (see sheet).  Keep diary x 3-5 days to determine your irritants.  -- KEGELS: do 10 in AM and 10 in PM, holding each x 10 seconds.  When you feel urge to go, STOP, KEGEL, and when urge has passed, then go to bathroom.  Start pelvic floor PT.  -- URGE:  Continue vesicare 10 mg.  (Can pause use while treating UTI) SE profile reviewed.  Takes 2-4 weeks to see if will have effect.  For dry mouth: get sour, sugar free lozenge or gum.    --STRESS:  Non surgical options: Pessary vs. Impressa vs Rivive - which represent urethral and bladder support devices; Surgical options including 1.  mid urethral sling; retropubic, transobturator vs single incision 2. Fascial slings 3. Sexton procedure 4. Periurethral bulking     I have sent in Macrobid to your pharmacy on file. The urine culture will still take up to 3 more days to result.  If it is not the correct medication, I will change it. Please remember to drink plenty of water to help flush out your kidneys and bladder to help with the infection. Please go to the ER for fever > 101, blood in the urine, nausea, or severe back pain.  Let our office know if you are not improving in a few days.       2. Nocturia (nighttime urination): Stop fluids 2 hours before bed.  If have leg swelling:  Elevate feet above chest x 1 hour before bed to get excess fluid off.  Can also use support hose (knee highs).       3. Vaginal atrophy (dryness):  Use 0.5-1 gram (dime size) of estrogen cream in vagina two nights a week .       4. Dyspareunia:  --Long discussion about possible causes for the pain, suspect  levator ani origin   --Start Pelvic floor therapy. See below for phone number to call to set up appointment.    -Consider  starting 10 mg Elavil nightly, may need to increase in the future, SE profile discussed   --Keep pelvic pain diary, detail when the pain is felt and where the pain extends to  --Discussed liberal use of lubrication and digitation prior to attempted penetration     [facility]  Pelvic floor PT(all take Medicaid):  1)  [facility]/[place]: (p) [phone]. (f) [phone]. Established patients call:  [phone].  2)  [facility] [place]: (p) [phone]  . (f) [phone].    3)  [facility] [place]: (p) [phone].  Or [phone].   4)  [facility] [place]. (p) [phone].      [facility]  [place]:  Dr. Kady Victor ([facility]: [address], [place], [place] [address]).  (p)  [phone].  (f) [phone]    --Medicaid as secondary/not primary    Missy Curran ([facility]/[place]): (p) [phone].  (f) [phone].    --Medicaid as secondary/not primary    Fernie Guerrero.  [facility].  [address] [phone]  [place] [address].  (p) [phone].  (f) [phone].  --Medicaid as secondary/not primary    Elissa Rehman ([facility] physical therapy:  [address] [facility]):  (p) [phone]. (f) [phone].   --Medicaid as secondary with Medicare/not primary    Stef Pavon, Amanda Fitzgerald, Lauren Shepley ([place]). (p) [phone]  (f) [phone]. --Takes Medicaid.     Casie Nunez, PT, DPT ([facility]: [address], [place]): (p) [phone].  (f) [phone].  --NO MEDICAID    RTC in 3 months    Max Carrion PA-C  Division of Female Pelvic Medicine and Reconstructive Surgery  Department of Obstetrics & Gynecology  [facility] [place], [place]

## 2022-11-02 NOTE — PATIENT INSTRUCTIONS
1. Mixed urinary incontinence, urge > stress:   -- UTI symptoms today, UA +, culture pending from clean catch, will start to treat empirically with Macrobid and pyridium  -- Take pyridium up to 3 times daily for max of 3 days at a time  -- Bladder diary for 3-7 days, write the number of times you go to the rest room and associated symptoms of urgency or leakage.   -- Empty bladder every 2-3 hours.  Empty well: wait a minute, lean forward on toilet.    -- Avoid dietary irritants (see sheet).  Keep diary x 3-5 days to determine your irritants.  -- KEGELS: do 10 in AM and 10 in PM, holding each x 10 seconds.  When you feel urge to go, STOP, KEGEL, and when urge has passed, then go to bathroom.  Start pelvic floor PT.  -- URGE:  Continue vesicare 10 mg.  (Can pause use while treating UTI) SE profile reviewed.  Takes 2-4 weeks to see if will have effect.  For dry mouth: get sour, sugar free lozenge or gum.    --STRESS:  Non surgical options: Pessary vs. Impressa vs Rivive - which represent urethral and bladder support devices; Surgical options including 1.  mid urethral sling; retropubic, transobturator vs single incision 2. Fascial slings 3. Sexton procedure 4. Periurethral bulking     I have sent in Macrobid to your pharmacy on file. The urine culture will still take up to 3 more days to result.  If it is not the correct medication, I will change it. Please remember to drink plenty of water to help flush out your kidneys and bladder to help with the infection. Please go to the ER for fever > 101, blood in the urine, nausea, or severe back pain.  Let our office know if you are not improving in a few days.       2. Nocturia (nighttime urination): Stop fluids 2 hours before bed.  If have leg swelling:  Elevate feet above chest x 1 hour before bed to get excess fluid off.  Can also use support hose (knee highs).       3. Vaginal atrophy (dryness):  Use 0.5-1 gram (dime size) of estrogen cream in vagina two nights a week .        4. Dyspareunia:  --Long discussion about possible causes for the pain, suspect  levator ani origin   --Start Pelvic floor therapy. See below for phone number to call to set up appointment.    -Consider starting 10 mg Elavil nightly, may need to increase in the future, SE profile discussed   --Keep pelvic pain diary, detail when the pain is felt and where the pain extends to  --Discussed liberal use of lubrication and digitation prior to attempted penetration     [facility]  Pelvic floor PT(all take Medicaid):  1)  [facility]: (p) [phone]. (f) [phone]. Established patients call:  [phone].  2)  [facility] [place]: (p) [phone]  . (f) [phone].    3)  [facility] [place]: (p) [phone].  Or [phone].   4)  [facility] [place]. (p) [phone].      NON-[facility]:  [place]:  Dr. Kady Victor ([facility]: [address], [place] [address]).  (p)  [phone].  (f) [phone]    --Medicaid as secondary/not primary    Missy Curran ([place]): (p) [phone].  (f) [phone].    --Medicaid as secondary/not primary    Fernie Guerrero.  [facility].  [address] [phone]  [place] [address].  (p) [phone].  (f) [phone].  --Medicaid as secondary/not primary    Elissa Rehman ([facility] physical therapy:  [address] [facility]):  (p) [phone]. (f) [phone].   --Medicaid as secondary with Medicare/not primary    Stef Pavon, Casie Nunez, Lauren Shepley ([facility]). (p) [phone]  (f) [phone]. --Takes Medicaid.     Casie Nunez, PT, DPT ([facility]: [address], [place] [address]): (p) [phone].  (f) [phone].  --NO MEDICAID    RTC in 3 months

## 2022-11-04 LAB — BACTERIA UR CULT: ABNORMAL

## 2023-05-10 ENCOUNTER — TELEPHONE (OUTPATIENT)
Dept: RADIOLOGY | Facility: HOSPITAL | Age: 43
End: 2023-05-10
Payer: COMMERCIAL

## 2023-05-10 ENCOUNTER — OFFICE VISIT (OUTPATIENT)
Dept: INTERNAL MEDICINE | Facility: CLINIC | Age: 43
End: 2023-05-10
Payer: COMMERCIAL

## 2023-05-10 VITALS
SYSTOLIC BLOOD PRESSURE: 134 MMHG | HEART RATE: 98 BPM | BODY MASS INDEX: 35.36 KG/M2 | HEIGHT: 66 IN | OXYGEN SATURATION: 97 % | WEIGHT: 220 LBS | DIASTOLIC BLOOD PRESSURE: 86 MMHG

## 2023-05-10 DIAGNOSIS — I10 BENIGN ESSENTIAL HYPERTENSION: ICD-10-CM

## 2023-05-10 DIAGNOSIS — E66.01 CLASS 2 SEVERE OBESITY DUE TO EXCESS CALORIES WITH SERIOUS COMORBIDITY AND BODY MASS INDEX (BMI) OF 35.0 TO 35.9 IN ADULT: ICD-10-CM

## 2023-05-10 DIAGNOSIS — R35.0 URINARY FREQUENCY: ICD-10-CM

## 2023-05-10 DIAGNOSIS — J30.9 ALLERGIC SINUSITIS: ICD-10-CM

## 2023-05-10 DIAGNOSIS — N63.14 MASS OF LOWER INNER QUADRANT OF RIGHT BREAST: ICD-10-CM

## 2023-05-10 DIAGNOSIS — R73.03 PREDIABETES: ICD-10-CM

## 2023-05-10 DIAGNOSIS — Z23 NEED FOR VACCINATION: ICD-10-CM

## 2023-05-10 DIAGNOSIS — Z00.00 VISIT FOR ANNUAL HEALTH EXAMINATION: Primary | ICD-10-CM

## 2023-05-10 DIAGNOSIS — G43.009 MIGRAINE WITHOUT AURA AND WITHOUT STATUS MIGRAINOSUS, NOT INTRACTABLE: ICD-10-CM

## 2023-05-10 DIAGNOSIS — Z13.220 LIPID SCREENING: ICD-10-CM

## 2023-05-10 DIAGNOSIS — J45.20 MILD INTERMITTENT ASTHMA WITH ALLERGIC RHINITIS WITHOUT COMPLICATION: ICD-10-CM

## 2023-05-10 DIAGNOSIS — R23.2 HOT FLASHES: ICD-10-CM

## 2023-05-10 DIAGNOSIS — E55.9 VITAMIN D DEFICIENCY: ICD-10-CM

## 2023-05-10 PROBLEM — H10.9 CONJUNCTIVITIS OF BOTH EYES: Status: RESOLVED | Noted: 2022-07-17 | Resolved: 2023-05-10

## 2023-05-10 PROBLEM — R07.9 CHEST PAIN: Status: RESOLVED | Noted: 2019-06-10 | Resolved: 2023-05-10

## 2023-05-10 LAB
BILIRUB UR QL STRIP: NEGATIVE
CLARITY UR REFRACT.AUTO: CLEAR
COLOR UR AUTO: YELLOW
GLUCOSE UR QL STRIP: NEGATIVE
HGB UR QL STRIP: NEGATIVE
KETONES UR QL STRIP: NEGATIVE
LEUKOCYTE ESTERASE UR QL STRIP: NEGATIVE
NITRITE UR QL STRIP: NEGATIVE
PH UR STRIP: 6 [PH] (ref 5–8)
PROT UR QL STRIP: NEGATIVE
SP GR UR STRIP: 1.02 (ref 1–1.03)
URN SPEC COLLECT METH UR: NORMAL

## 2023-05-10 PROCEDURE — 90471 IMMUNIZATION ADMIN: CPT | Mod: S$GLB,,, | Performed by: INTERNAL MEDICINE

## 2023-05-10 PROCEDURE — 90677 PCV20 VACCINE IM: CPT | Mod: S$GLB,,, | Performed by: INTERNAL MEDICINE

## 2023-05-10 PROCEDURE — 81003 URINALYSIS AUTO W/O SCOPE: CPT | Performed by: INTERNAL MEDICINE

## 2023-05-10 PROCEDURE — 1160F PR REVIEW ALL MEDS BY PRESCRIBER/CLIN PHARMACIST DOCUMENTED: ICD-10-PCS | Mod: CPTII,S$GLB,, | Performed by: INTERNAL MEDICINE

## 2023-05-10 PROCEDURE — 99396 PR PREVENTIVE VISIT,EST,40-64: ICD-10-PCS | Mod: 25,S$GLB,, | Performed by: INTERNAL MEDICINE

## 2023-05-10 PROCEDURE — 3075F SYST BP GE 130 - 139MM HG: CPT | Mod: CPTII,S$GLB,, | Performed by: INTERNAL MEDICINE

## 2023-05-10 PROCEDURE — 99396 PREV VISIT EST AGE 40-64: CPT | Mod: 25,S$GLB,, | Performed by: INTERNAL MEDICINE

## 2023-05-10 PROCEDURE — 90472 PNEUMOCOCCAL CONJUGATE VACCINE 20-VALENT: ICD-10-PCS | Mod: S$GLB,,, | Performed by: INTERNAL MEDICINE

## 2023-05-10 PROCEDURE — 3079F PR MOST RECENT DIASTOLIC BLOOD PRESSURE 80-89 MM HG: ICD-10-PCS | Mod: CPTII,S$GLB,, | Performed by: INTERNAL MEDICINE

## 2023-05-10 PROCEDURE — 90715 TDAP VACCINE 7 YRS/> IM: CPT | Mod: S$GLB,,, | Performed by: INTERNAL MEDICINE

## 2023-05-10 PROCEDURE — 99999 PR PBB SHADOW E&M-EST. PATIENT-LVL IV: ICD-10-PCS | Mod: PBBFAC,,, | Performed by: INTERNAL MEDICINE

## 2023-05-10 PROCEDURE — 90715 TDAP VACCINE GREATER THAN OR EQUAL TO 7YO IM: ICD-10-PCS | Mod: S$GLB,,, | Performed by: INTERNAL MEDICINE

## 2023-05-10 PROCEDURE — 3008F BODY MASS INDEX DOCD: CPT | Mod: CPTII,S$GLB,, | Performed by: INTERNAL MEDICINE

## 2023-05-10 PROCEDURE — 3008F PR BODY MASS INDEX (BMI) DOCUMENTED: ICD-10-PCS | Mod: CPTII,S$GLB,, | Performed by: INTERNAL MEDICINE

## 2023-05-10 PROCEDURE — 1160F RVW MEDS BY RX/DR IN RCRD: CPT | Mod: CPTII,S$GLB,, | Performed by: INTERNAL MEDICINE

## 2023-05-10 PROCEDURE — 90471 TDAP VACCINE GREATER THAN OR EQUAL TO 7YO IM: ICD-10-PCS | Mod: S$GLB,,, | Performed by: INTERNAL MEDICINE

## 2023-05-10 PROCEDURE — 90677 PNEUMOCOCCAL CONJUGATE VACCINE 20-VALENT: ICD-10-PCS | Mod: S$GLB,,, | Performed by: INTERNAL MEDICINE

## 2023-05-10 PROCEDURE — 1159F PR MEDICATION LIST DOCUMENTED IN MEDICAL RECORD: ICD-10-PCS | Mod: CPTII,S$GLB,, | Performed by: INTERNAL MEDICINE

## 2023-05-10 PROCEDURE — 1159F MED LIST DOCD IN RCRD: CPT | Mod: CPTII,S$GLB,, | Performed by: INTERNAL MEDICINE

## 2023-05-10 PROCEDURE — 3079F DIAST BP 80-89 MM HG: CPT | Mod: CPTII,S$GLB,, | Performed by: INTERNAL MEDICINE

## 2023-05-10 PROCEDURE — 90472 IMMUNIZATION ADMIN EACH ADD: CPT | Mod: S$GLB,,, | Performed by: INTERNAL MEDICINE

## 2023-05-10 PROCEDURE — 99999 PR PBB SHADOW E&M-EST. PATIENT-LVL IV: CPT | Mod: PBBFAC,,, | Performed by: INTERNAL MEDICINE

## 2023-05-10 PROCEDURE — 3075F PR MOST RECENT SYSTOLIC BLOOD PRESS GE 130-139MM HG: ICD-10-PCS | Mod: CPTII,S$GLB,, | Performed by: INTERNAL MEDICINE

## 2023-05-10 RX ORDER — AMLODIPINE BESYLATE 5 MG/1
5 TABLET ORAL DAILY
Qty: 90 TABLET | Refills: 3 | Status: SHIPPED | OUTPATIENT
Start: 2023-05-10 | End: 2024-05-09

## 2023-05-10 RX ORDER — ALBUTEROL SULFATE 90 UG/1
2 AEROSOL, METERED RESPIRATORY (INHALATION) EVERY 6 HOURS PRN
Qty: 18 G | Refills: 3 | Status: SHIPPED | OUTPATIENT
Start: 2023-05-10 | End: 2023-11-06

## 2023-05-10 RX ORDER — FLUTICASONE PROPIONATE AND SALMETEROL 250; 50 UG/1; UG/1
1 POWDER RESPIRATORY (INHALATION) 2 TIMES DAILY
Qty: 14 EACH | Refills: 3 | Status: SHIPPED | OUTPATIENT
Start: 2023-05-10 | End: 2023-11-06

## 2023-05-10 RX ORDER — ERGOCALCIFEROL 1.25 MG/1
50000 CAPSULE ORAL
Qty: 12 CAPSULE | Refills: 3 | Status: SHIPPED | OUTPATIENT
Start: 2023-05-10

## 2023-05-10 RX ORDER — AZELASTINE 1 MG/ML
1 SPRAY, METERED NASAL 2 TIMES DAILY
Qty: 30 ML | Refills: 3 | Status: SHIPPED | OUTPATIENT
Start: 2023-05-10 | End: 2024-05-09

## 2023-05-10 RX ORDER — FLUTICASONE PROPIONATE 50 MCG
1 SPRAY, SUSPENSION (ML) NASAL DAILY
Qty: 16 G | Refills: 3 | Status: SHIPPED | OUTPATIENT
Start: 2023-05-10

## 2023-05-10 RX ORDER — LEVOCETIRIZINE DIHYDROCHLORIDE 5 MG/1
5 TABLET, FILM COATED ORAL NIGHTLY
Qty: 90 TABLET | Refills: 3 | Status: SHIPPED | OUTPATIENT
Start: 2023-05-10 | End: 2024-05-09

## 2023-05-10 NOTE — PROGRESS NOTES
Patient two identifier used. (name and ) patient tolerated injection well. Advised to wait 15 minutes post injection

## 2023-05-10 NOTE — PROGRESS NOTES
INTERNAL MEDICINE ESTABLISHED PATIENT VISIT NOTE    Subjective:     Chief Complaint: Annual     Patient ID: Cristina Man is a 43 y.o. female with mild intermittent asthma c allergic rhinitis (prev on Advair and flonase but currently not on meds), hx migraines, hx anemia resolved on last few checks, obesity, preDM (noted on labs from 2011, 5.9), and stress/urge incontinence last seen by Dr. Hayden 12/22/21 for asthma follow-up, here today for annual exam.     Was started on Singulair at last visit but states she thinks it makes her feel antonio.  Was seen by Saadia Strong in March who started Dulera and states it helped but says it was too expensive.  States her allergies have not been well controlled despite Zyrtec, Flonase, and Astelin.     Feels SOB at times so uses Albuterol prn.  Denies recent issues c wheezing.    Past Medical History:  Past Medical History:   Diagnosis Date    Abnormal Pap smear of cervix     Allergic rhinitis due to allergen     Asthma at age 4       Home Medications:  Prior to Admission medications    Medication Sig Start Date End Date Taking? Authorizing Provider   albuterol (PROVENTIL/VENTOLIN HFA) 90 mcg/actuation inhaler Inhale 2 puffs into the lungs every 6 (six) hours as needed for Wheezing. Rescue 12/22/21 12/22/22  Madelaine Hayden MD   amLODIPine (NORVASC) 5 MG tablet Take 1 tablet (5 mg total) by mouth once daily. 12/22/21 12/22/22  Madelaine Hayden MD   azelastine (ASTELIN) 137 mcg (0.1 %) nasal spray 1 spray (137 mcg total) by Nasal route 2 (two) times daily. 7/19/22 7/19/23  Saadia Strong PA-C   ergocalciferol (ERGOCALCIFEROL) 50,000 unit Cap Take 1 capsule (50,000 Units total) by mouth every 7 days. 12/23/21   Madelaine Hayden MD   esomeprazole (NEXIUM) 40 MG capsule Take 1 capsule (40 mg total) by mouth before breakfast. 7/8/21 8/7/21  eJanne Ferguson MD   estradioL (ESTRACE) 0.01 % (0.1 mg/gram) vaginal cream Place 0.5 g vaginally twice a week. 11/3/22   Max Ron PA-C    fluticasone propionate (FLONASE) 50 mcg/actuation nasal spray 1 spray (50 mcg total) by Each Nostril route once daily. 7/19/22   Saadia Strong PA-C   fluticasone-salmeterol diskus inhaler 250-50 mcg Inhale 1 puff into the lungs 2 (two) times daily. Controller 12/22/21 12/22/22  Madelaine Hayden MD   ibuprofen (ADVIL,MOTRIN) 600 MG tablet Take 1 tablet (600 mg total) by mouth daily as needed for Pain. 4/22/21   Madelaine Hayden MD   levocetirizine (XYZAL) 5 MG tablet Take 1 tablet (5 mg total) by mouth every evening. 7/19/22 7/19/23  Saadia Strong PA-C   ondansetron (ZOFRAN) 4 MG tablet Take 1 tablet (4 mg total) by mouth every 6 (six) hours as needed for Nausea. 7/8/21   Jeanne Ferguson MD   phenazopyridine (PYRIDIUM) 200 MG tablet Take 1 tablet (200 mg total) by mouth 3 (three) times daily as needed for Pain. 11/2/22   Max Ron PA-C   solifenacin (VESICARE) 10 MG tablet Take 1 tablet (10 mg total) by mouth once daily. 10/31/22 10/31/23  Ernestine Chatman NP   tiZANidine (ZANAFLEX) 4 MG tablet TAKE 1 TABLET(4 MG) BY MOUTH EVERY NIGHT AS NEEDED 8/5/22   Saadia Strong PA-C       Allergies:  Review of patient's allergies indicates:  No Known Allergies    Social History:  Social History     Tobacco Use    Smoking status: Never    Smokeless tobacco: Never   Substance Use Topics    Alcohol use: Yes     Comment: occ    Drug use: No        Review of Systems   Constitutional:  Negative for activity change and unexpected weight change.   HENT:  Negative for hearing loss, rhinorrhea and trouble swallowing.    Eyes:  Negative for discharge and visual disturbance.   Respiratory:  Positive for chest tightness and wheezing.    Cardiovascular:  Positive for chest pain and palpitations.   Gastrointestinal:  Negative for blood in stool, constipation, diarrhea and vomiting.   Endocrine: Positive for polyuria. Negative for polydipsia.   Genitourinary:  Negative for difficulty urinating, dysuria, hematuria and menstrual  problem.   Musculoskeletal:  Negative for arthralgias, joint swelling and neck pain.   Neurological:  Negative for weakness and headaches.   Psychiatric/Behavioral:  Negative for confusion and dysphoric mood.        Health Maintenance:       Objective:   LMP 04/27/2015        General: AAO x3, no apparent distress  HEENT: PERRL, OP clear  CV: RRR, no m/r/g  Pulm: Lungs CTAB, no crackles, no wheezes  Abd: s/NT/ND +BS  Extremities: no c/c/e    Labs:     Lab Results   Component Value Date    WBC 4.21 04/24/2021    HGB 12.4 04/24/2021    HCT 38.6 04/24/2021    MCV 92 04/24/2021     04/24/2021     Sodium   Date Value Ref Range Status   12/22/2021 139 136 - 145 mmol/L Final     Potassium   Date Value Ref Range Status   12/22/2021 4.4 3.5 - 5.1 mmol/L Final     Chloride   Date Value Ref Range Status   12/22/2021 104 95 - 110 mmol/L Final     CO2   Date Value Ref Range Status   12/22/2021 28 23 - 29 mmol/L Final     Glucose   Date Value Ref Range Status   12/22/2021 93 70 - 110 mg/dL Final     BUN   Date Value Ref Range Status   12/22/2021 11 6 - 20 mg/dL Final     Creatinine   Date Value Ref Range Status   12/22/2021 0.7 0.5 - 1.4 mg/dL Final     Calcium   Date Value Ref Range Status   12/22/2021 9.7 8.7 - 10.5 mg/dL Final     Total Protein   Date Value Ref Range Status   12/22/2021 7.5 6.0 - 8.4 g/dL Final     Albumin   Date Value Ref Range Status   12/22/2021 3.7 3.5 - 5.2 g/dL Final     Total Bilirubin   Date Value Ref Range Status   12/22/2021 0.3 0.1 - 1.0 mg/dL Final     Comment:     For infants and newborns, interpretation of results should be based  on gestational age, weight and in agreement with clinical  observations.    Premature Infant recommended reference ranges:  Up to 24 hours.............<8.0 mg/dL  Up to 48 hours............<12.0 mg/dL  3-5 days..................<15.0 mg/dL  6-29 days.................<15.0 mg/dL       Alkaline Phosphatase   Date Value Ref Range Status   12/22/2021 81 55 - 135 U/L  Final     AST   Date Value Ref Range Status   12/22/2021 17 10 - 40 U/L Final     ALT   Date Value Ref Range Status   12/22/2021 17 10 - 44 U/L Final     Anion Gap   Date Value Ref Range Status   12/22/2021 7 (L) 8 - 16 mmol/L Final     eGFR if    Date Value Ref Range Status   12/22/2021 >60.0 >60 mL/min/1.73 m^2 Final     eGFR if non    Date Value Ref Range Status   12/22/2021 >60.0 >60 mL/min/1.73 m^2 Final     Comment:     Calculation used to obtain the estimated glomerular filtration  rate (eGFR) is the CKD-EPI equation.        Lab Results   Component Value Date    HGBA1C 6.2 (H) 12/22/2021     Lab Results   Component Value Date    LDLCALC 124.2 04/24/2021     Lab Results   Component Value Date    TSH 0.749 04/24/2021         Assessment/Plan     Mild intermittent asthma with allergic rhinitis without complication  As per HPI  Issues c cost with Dulera which helped.  Will try Advair bid  Continue Xyzal  Will have Allergy eval  -     fluticasone-salmeterol diskus inhaler 250-50 mcg; Inhale 1 puff into the lungs 2 (two) times daily. Controller  -     albuterol (PROVENTIL/VENTOLIN HFA) 90 mcg/actuation inhaler; Inhale 2 puffs into the lungs every 6 (six) hours as needed for Wheezing. Rescue  -     levocetirizine (XYZAL) 5 MG tablet; Take 1 tablet (5 mg total) by mouth every evening.  -     Ambulatory referral/consult to Allergy; Future     Benign essential hypertension  Now controlled on Amlodipine  Cont meds.  -     amLODIPine (NORVASC) 5 MG tablet; Take 1 tablet (5 mg total) by mouth once daily.  -     Comprehensive Metabolic Panel; Future     Prediabetes    Lab Results   Component Value Date    HGBA1C 6.2 (H) 12/22/2021     Worse than last check (6/1 in 2021), was counseled on diet.  Will repeat labs now.  Pt was counseled on the need to avoid intake of simple sugars and minimize carbohydrates.  Also recommended weight loss and daily exercise.  -     Hemoglobin A1C; Future      Class 2 severe obesity due to excess calories with serious comorbidity and body mass index (BMI) of 37.0 to 37.9 in adult  Weight gain since last visit.  Counseled extensively on need for diet changes and daily exercise.  Discussed examples of healthy eating.  Recommend at least 30 minutes of exercise at least 5 days a week.    Pt wants to consider Ozempic if covered  - refer to bariatrics     Vitamin D deficiency  rx'ed ergocalciferol at last visit, unsure if taking consistently  Last vit D 12/22/21 was 14   Will repeat labs now.  -     Vitamin D; Future    Mixed urinary incontinence urge>stress   - being followed by urogyn   - continue vesicare, empty bladder every 2-3 hours, kegels, ans avoid dietary irritants        HM as above  RTC in 6 mos for annual exam, sooner if needed.  Labs today.    Amrita Kasper, MS4    Madelaine Hayden MD  Department of Internal Medicine - [facility]  05/10/2023

## 2023-05-10 NOTE — PROGRESS NOTES
INTERNAL MEDICINE ESTABLISHED PATIENT VISIT NOTE    Subjective:     Chief Complaint: Urinary Frequency, Breast Mass (Right breast), Asthma, Headache, and Annual Exam       Patient ID: Cristina Man is a 43 y.o. female with HTN, preDM, mild intermittent asthma with allergic rhinitis, history of migraines, history of anemia resolved, obesity, last seen by me 2021, here today for annual exam.    At last visit was having issues with recurrent asthma.  Dulera was too expensive for her so was prescribed Advair.  Was also previously on Singulair which was stopped due to mood issues on medications.  Zyrtec was also change to Xyzal at that time.  Pt states her meds are  and does not think they are working as well, requesting refills.    Has been on amlodipine for hypertension but has not taken meds in about 5 days.  Today c/o h/a which varies, sometimes across her entire forehead and sometimes localized to the R eye.  No pain at present.    Weight fluctuating but down 20 lbs since November.    Should be on ergocalciferol for vitamin-D deficiency which she has not been consistent with taking.    Was also having issues last year with pain in her neck and her shoulders and completed physical therapy for this.    Also followed by Urogynecology for issues with urinary frequency.  Was rx'ed Myrbetriq which she never got due to issues with cost so was prescribed VESIcare instead which she does not feel has helped.  Was also prescribed estrogen cream.    Today c/o lump on her R breast along the surgical incision of old breast reduction.  Noticed it several weeks ago.  Not painful.  Had mmg in Sept that was benign.    Past Medical History:  Past Medical History:   Diagnosis Date    Abnormal Pap smear of cervix     Allergic rhinitis due to allergen     Asthma at age 4       Home Medications:  Prior to Admission medications    Medication Sig Start Date End Date Taking? Authorizing Provider   albuterol (PROVENTIL/VENTOLIN HFA)  90 mcg/actuation inhaler Inhale 2 puffs into the lungs every 6 (six) hours as needed for Wheezing. Rescue 12/22/21 12/22/22  Madelaine Hayden MD   amLODIPine (NORVASC) 5 MG tablet Take 1 tablet (5 mg total) by mouth once daily. 12/22/21 12/22/22  Madelaine Hayden MD   azelastine (ASTELIN) 137 mcg (0.1 %) nasal spray 1 spray (137 mcg total) by Nasal route 2 (two) times daily. 7/19/22 7/19/23  Saadia Strong PA-C   ergocalciferol (ERGOCALCIFEROL) 50,000 unit Cap Take 1 capsule (50,000 Units total) by mouth every 7 days. 12/23/21   Madelaine Hayden MD   esomeprazole (NEXIUM) 40 MG capsule Take 1 capsule (40 mg total) by mouth before breakfast. 7/8/21 8/7/21  Jeanne Ferguson MD   estradioL (ESTRACE) 0.01 % (0.1 mg/gram) vaginal cream Place 0.5 g vaginally twice a week. 11/3/22   Max Ron PA-C   fluticasone propionate (FLONASE) 50 mcg/actuation nasal spray 1 spray (50 mcg total) by Each Nostril route once daily. 7/19/22   Saadia Strong PA-C   fluticasone-salmeterol diskus inhaler 250-50 mcg Inhale 1 puff into the lungs 2 (two) times daily. Controller 12/22/21 12/22/22  Madelaine Hayden MD   ibuprofen (ADVIL,MOTRIN) 600 MG tablet Take 1 tablet (600 mg total) by mouth daily as needed for Pain. 4/22/21   Madelaine Hayden MD   levocetirizine (XYZAL) 5 MG tablet Take 1 tablet (5 mg total) by mouth every evening. 7/19/22 7/19/23  Saadia Strong PA-C   ondansetron (ZOFRAN) 4 MG tablet Take 1 tablet (4 mg total) by mouth every 6 (six) hours as needed for Nausea. 7/8/21   Jeanne Ferguson MD   phenazopyridine (PYRIDIUM) 200 MG tablet Take 1 tablet (200 mg total) by mouth 3 (three) times daily as needed for Pain. 11/2/22   Max Ron PA-C   solifenacin (VESICARE) 10 MG tablet Take 1 tablet (10 mg total) by mouth once daily. 10/31/22 10/31/23  Ernestine Chatman, FELIBERTO   tiZANidine (ZANAFLEX) 4 MG tablet TAKE 1 TABLET(4 MG) BY MOUTH EVERY NIGHT AS NEEDED 8/5/22   Saadia Strong PA-C       Allergies:  Review of patient's allergies  "indicates:  No Known Allergies    Social History:  Social History     Tobacco Use    Smoking status: Never    Smokeless tobacco: Never   Substance Use Topics    Alcohol use: Yes     Comment: occ    Drug use: No        Review of Systems   Constitutional:  Negative for activity change and unexpected weight change.   HENT:  Negative for hearing loss, rhinorrhea and trouble swallowing.    Eyes:  Negative for discharge and visual disturbance.   Respiratory:  Positive for chest tightness and wheezing.    Cardiovascular:  Positive for chest pain and palpitations.   Gastrointestinal:  Negative for blood in stool, constipation, diarrhea and vomiting.   Endocrine: Positive for polyuria. Negative for polydipsia.   Genitourinary:  Negative for difficulty urinating, dysuria, hematuria and menstrual problem.   Musculoskeletal:  Negative for arthralgias, joint swelling and neck pain.   Neurological:  Negative for weakness and headaches.   Psychiatric/Behavioral:  Negative for confusion and dysphoric mood.        Health Maintenance:     Immunizations:   Influenza 12/2021, recommend repeat in September  TDap rec today  Pneumovax 12/2019 based on dx of asthma, rec Prevnar today  Shingrix rec at 49  COVID vacc 3/2021, 4/2021, 1/2022, rec booster when able, can schedule online.     Cancer Screening:  PAP: hx hyst 2/2 irregular bleeding, states cervix removed, benign, still has ovaries, sees Dr. Jovel, last seen 9/2022  Mammogram:  9/2022 benign  Colonoscopy:  rec at 44    Objective:   /86 (BP Location: Right arm, Patient Position: Sitting, BP Method: Medium (Manual))   Pulse 98   Ht 5' 6" (1.676 m)   Wt 99.8 kg (220 lb 0.3 oz)   LMP 04/27/2015   SpO2 97%   BMI 35.51 kg/m²        General: AAO x3, no apparent distress  HEENT: PERRL, OP clear  CV: RRR, no m/r/g  Pulm: Lungs CTAB, no crackles, no wheezes  Abd: s/NT/ND +BS  Extremities: no c/c/e  Breast: scars noted on B breast.  Small dense area of firmness located at 6 " o'clock position on R breast just medial to her scar but also c small punctate blackhead appearing lesion adjacent.    Labs:     Lab Results   Component Value Date    WBC 4.21 04/24/2021    HGB 12.4 04/24/2021    HCT 38.6 04/24/2021    MCV 92 04/24/2021     04/24/2021     Sodium   Date Value Ref Range Status   12/22/2021 139 136 - 145 mmol/L Final     Potassium   Date Value Ref Range Status   12/22/2021 4.4 3.5 - 5.1 mmol/L Final     Chloride   Date Value Ref Range Status   12/22/2021 104 95 - 110 mmol/L Final     CO2   Date Value Ref Range Status   12/22/2021 28 23 - 29 mmol/L Final     Glucose   Date Value Ref Range Status   12/22/2021 93 70 - 110 mg/dL Final     BUN   Date Value Ref Range Status   12/22/2021 11 6 - 20 mg/dL Final     Creatinine   Date Value Ref Range Status   12/22/2021 0.7 0.5 - 1.4 mg/dL Final     Calcium   Date Value Ref Range Status   12/22/2021 9.7 8.7 - 10.5 mg/dL Final     Total Protein   Date Value Ref Range Status   12/22/2021 7.5 6.0 - 8.4 g/dL Final     Albumin   Date Value Ref Range Status   12/22/2021 3.7 3.5 - 5.2 g/dL Final     Total Bilirubin   Date Value Ref Range Status   12/22/2021 0.3 0.1 - 1.0 mg/dL Final     Comment:     For infants and newborns, interpretation of results should be based  on gestational age, weight and in agreement with clinical  observations.    Premature Infant recommended reference ranges:  Up to 24 hours.............<8.0 mg/dL  Up to 48 hours............<12.0 mg/dL  3-5 days..................<15.0 mg/dL  6-29 days.................<15.0 mg/dL       Alkaline Phosphatase   Date Value Ref Range Status   12/22/2021 81 55 - 135 U/L Final     AST   Date Value Ref Range Status   12/22/2021 17 10 - 40 U/L Final     ALT   Date Value Ref Range Status   12/22/2021 17 10 - 44 U/L Final     Anion Gap   Date Value Ref Range Status   12/22/2021 7 (L) 8 - 16 mmol/L Final     eGFR if    Date Value Ref Range Status   12/22/2021 >60.0 >60 mL/min/1.73  m^2 Final     eGFR if non    Date Value Ref Range Status   12/22/2021 >60.0 >60 mL/min/1.73 m^2 Final     Comment:     Calculation used to obtain the estimated glomerular filtration  rate (eGFR) is the CKD-EPI equation.        Lab Results   Component Value Date    HGBA1C 6.2 (H) 12/22/2021     Lab Results   Component Value Date    LDLCALC 124.2 04/24/2021     Lab Results   Component Value Date    TSH 0.749 04/24/2021         Assessment/Plan     Cristina was seen today for urinary frequency, breast mass, asthma, headache and annual exam.    Diagnoses and all orders for this visit:    Visit for annual health examination  History and physical exam completed.  Health maintenance reviewed as above.  -     CBC Auto Differential; Future  -     Comprehensive Metabolic Panel; Future  -     Hemoglobin A1C; Future  -     Lipid Panel; Future  -     TSH; Future    Benign essential hypertension  Not taking meds consistently  Advised to take daily as higher BP can contribute to h/a  Refills sent  -     amLODIPine (NORVASC) 5 MG tablet; Take 1 tablet (5 mg total) by mouth once daily.    Prediabetes  Lab Results   Component Value Date    HGBA1C 6.2 (H) 12/22/2021     Needs updated labs  Pt was counseled on the need to avoid intake of simple sugars and minimize carbohydrates.  Also recommended weight loss and daily exercise.  -     CBC Auto Differential; Future  -     Comprehensive Metabolic Panel; Future  -     Hemoglobin A1C; Future  -     TSH; Future    Urinary frequency  Repeat a1c  Check u/a  Rec f/u c Urogyn  -     Urinalysis, Reflex to Urine Culture Urine, Clean Catch    Mild intermittent asthma with allergic rhinitis without complication  Recurrent sx but all meds over a year old  Will refill today, advised to let me know if sx do not improve  -     fluticasone-salmeterol diskus inhaler 250-50 mcg; Inhale 1 puff into the lungs 2 (two) times daily. Controller  -     fluticasone propionate (FLONASE) 50  mcg/actuation nasal spray; 1 spray (50 mcg total) by Each Nostril route once daily.  -     azelastine (ASTELIN) 137 mcg (0.1 %) nasal spray; 1 spray (137 mcg total) by Nasal route 2 (two) times daily.  -     albuterol (PROVENTIL/VENTOLIN HFA) 90 mcg/actuation inhaler; Inhale 2 puffs into the lungs every 6 (six) hours as needed for Wheezing. Rescue  -     levocetirizine (XYZAL) 5 MG tablet; Take 1 tablet (5 mg total) by mouth every evening.    Migraine without aura and without status migrainosus, not intractable  As per HPI  Rec bp control as above  Otc   Excedrin prn okay    Class 2 severe obesity due to excess calories with serious comorbidity and body mass index (BMI) of 35.0 to 35.9 in adult  Doing well c wt loss  Counseled extensively on need for diet changes and daily exercise.  Discussed examples of healthy eating.  Recommend at least 30 minutes of exercise at least 5 days a week.      Allergic sinusitis  -     fluticasone propionate (FLONASE) 50 mcg/actuation nasal spray; 1 spray (50 mcg total) by Each Nostril route once daily.  -     azelastine (ASTELIN) 137 mcg (0.1 %) nasal spray; 1 spray (137 mcg total) by Nasal route 2 (two) times daily.  -     levocetirizine (XYZAL) 5 MG tablet; Take 1 tablet (5 mg total) by mouth every evening.    Vitamin D deficiency  Advised to resume meds  -     ergocalciferol (ERGOCALCIFEROL) 50,000 unit Cap; Take 1 capsule (50,000 Units total) by mouth every 7 days.  -     Vitamin D; Future    Mass of lower inner quadrant of right breast  As per HPI  Unsure if scar tissue vs epidermal inclusion cyst  Will send for u/s  -     US Breast Right Limited; Future    Lipid screening  -     Lipid Panel; Future    Hot flashes  Noted on HPI  Still has ovaries, hx hyst  -     FOLLICLE STIMULATING HORMONE; Future  -     ESTRADIOL; Future    Need for vaccination  -     (In Office Administered) Tdap Vaccine  -     (In Office Administered) Pneumococcal Conjugate Vaccine (20 Valent) (IM)      HM  as above  RTC in 6 mos, sooner if needed.  Fasting labs on Sat    Madelaine Hayden MD  Department of Internal Medicine - [facility]  05/10/2023

## 2023-05-10 NOTE — TELEPHONE ENCOUNTER
----- Message from Isela Camacho sent at 5/10/2023 11:35 AM CDT -----  Regarding: Scheduling of US Breast  Patient has a lump in her right breast, please contact her for an appointment.  Thanks

## 2023-05-13 ENCOUNTER — LAB VISIT (OUTPATIENT)
Dept: LAB | Facility: HOSPITAL | Age: 43
End: 2023-05-13
Attending: INTERNAL MEDICINE
Payer: COMMERCIAL

## 2023-05-13 DIAGNOSIS — Z13.220 LIPID SCREENING: ICD-10-CM

## 2023-05-13 DIAGNOSIS — Z00.00 VISIT FOR ANNUAL HEALTH EXAMINATION: ICD-10-CM

## 2023-05-13 DIAGNOSIS — R73.03 PREDIABETES: ICD-10-CM

## 2023-05-13 DIAGNOSIS — E55.9 VITAMIN D DEFICIENCY: ICD-10-CM

## 2023-05-13 DIAGNOSIS — R23.2 HOT FLASHES: ICD-10-CM

## 2023-05-13 LAB
25(OH)D3+25(OH)D2 SERPL-MCNC: 16 NG/ML (ref 30–96)
ALBUMIN SERPL BCP-MCNC: 3.8 G/DL (ref 3.5–5.2)
ALP SERPL-CCNC: 93 U/L (ref 55–135)
ALT SERPL W/O P-5'-P-CCNC: 11 U/L (ref 10–44)
ANION GAP SERPL CALC-SCNC: 9 MMOL/L (ref 8–16)
AST SERPL-CCNC: 13 U/L (ref 10–40)
BASOPHILS # BLD AUTO: 0.02 K/UL (ref 0–0.2)
BASOPHILS NFR BLD: 0.4 % (ref 0–1.9)
BILIRUB SERPL-MCNC: 0.6 MG/DL (ref 0.1–1)
BUN SERPL-MCNC: 9 MG/DL (ref 6–20)
CALCIUM SERPL-MCNC: 9.1 MG/DL (ref 8.7–10.5)
CHLORIDE SERPL-SCNC: 108 MMOL/L (ref 95–110)
CHOLEST SERPL-MCNC: 201 MG/DL (ref 120–199)
CHOLEST/HDLC SERPL: 3 {RATIO} (ref 2–5)
CO2 SERPL-SCNC: 24 MMOL/L (ref 23–29)
CREAT SERPL-MCNC: 0.9 MG/DL (ref 0.5–1.4)
DIFFERENTIAL METHOD: NORMAL
EOSINOPHIL # BLD AUTO: 0.3 K/UL (ref 0–0.5)
EOSINOPHIL NFR BLD: 4.7 % (ref 0–8)
ERYTHROCYTE [DISTWIDTH] IN BLOOD BY AUTOMATED COUNT: 13 % (ref 11.5–14.5)
EST. GFR  (NO RACE VARIABLE): >60 ML/MIN/1.73 M^2
ESTIMATED AVG GLUCOSE: 117 MG/DL (ref 68–131)
ESTRADIOL SERPL-MCNC: 26 PG/ML
FSH SERPL-ACNC: 10.49 MIU/ML
GLUCOSE SERPL-MCNC: 107 MG/DL (ref 70–110)
HBA1C MFR BLD: 5.7 % (ref 4–5.6)
HCT VFR BLD AUTO: 38.8 % (ref 37–48.5)
HDLC SERPL-MCNC: 66 MG/DL (ref 40–75)
HDLC SERPL: 32.8 % (ref 20–50)
HGB BLD-MCNC: 12.9 G/DL (ref 12–16)
IMM GRANULOCYTES # BLD AUTO: 0.02 K/UL (ref 0–0.04)
IMM GRANULOCYTES NFR BLD AUTO: 0.4 % (ref 0–0.5)
LDLC SERPL CALC-MCNC: 124.6 MG/DL (ref 63–159)
LYMPHOCYTES # BLD AUTO: 1.5 K/UL (ref 1–4.8)
LYMPHOCYTES NFR BLD: 27.8 % (ref 18–48)
MCH RBC QN AUTO: 30.6 PG (ref 27–31)
MCHC RBC AUTO-ENTMCNC: 33.2 G/DL (ref 32–36)
MCV RBC AUTO: 92 FL (ref 82–98)
MONOCYTES # BLD AUTO: 0.5 K/UL (ref 0.3–1)
MONOCYTES NFR BLD: 9.3 % (ref 4–15)
NEUTROPHILS # BLD AUTO: 3 K/UL (ref 1.8–7.7)
NEUTROPHILS NFR BLD: 57.4 % (ref 38–73)
NONHDLC SERPL-MCNC: 135 MG/DL
NRBC BLD-RTO: 0 /100 WBC
PLATELET # BLD AUTO: 302 K/UL (ref 150–450)
PMV BLD AUTO: 9.2 FL (ref 9.2–12.9)
POTASSIUM SERPL-SCNC: 4.1 MMOL/L (ref 3.5–5.1)
PROT SERPL-MCNC: 7.5 G/DL (ref 6–8.4)
RBC # BLD AUTO: 4.22 M/UL (ref 4–5.4)
SODIUM SERPL-SCNC: 141 MMOL/L (ref 136–145)
TRIGL SERPL-MCNC: 52 MG/DL (ref 30–150)
TSH SERPL DL<=0.005 MIU/L-ACNC: 0.67 UIU/ML (ref 0.4–4)
WBC # BLD AUTO: 5.29 K/UL (ref 3.9–12.7)

## 2023-05-13 PROCEDURE — 80061 LIPID PANEL: CPT | Performed by: INTERNAL MEDICINE

## 2023-05-13 PROCEDURE — 83036 HEMOGLOBIN GLYCOSYLATED A1C: CPT | Performed by: INTERNAL MEDICINE

## 2023-05-13 PROCEDURE — 83001 ASSAY OF GONADOTROPIN (FSH): CPT | Performed by: INTERNAL MEDICINE

## 2023-05-13 PROCEDURE — 82306 VITAMIN D 25 HYDROXY: CPT | Performed by: INTERNAL MEDICINE

## 2023-05-13 PROCEDURE — 36415 COLL VENOUS BLD VENIPUNCTURE: CPT | Performed by: INTERNAL MEDICINE

## 2023-05-13 PROCEDURE — 85025 COMPLETE CBC W/AUTO DIFF WBC: CPT | Performed by: INTERNAL MEDICINE

## 2023-05-13 PROCEDURE — 80053 COMPREHEN METABOLIC PANEL: CPT | Performed by: INTERNAL MEDICINE

## 2023-05-13 PROCEDURE — 84443 ASSAY THYROID STIM HORMONE: CPT | Performed by: INTERNAL MEDICINE

## 2023-05-13 PROCEDURE — 82670 ASSAY OF TOTAL ESTRADIOL: CPT | Performed by: INTERNAL MEDICINE

## 2023-07-13 ENCOUNTER — HOSPITAL ENCOUNTER (OUTPATIENT)
Dept: RADIOLOGY | Facility: HOSPITAL | Age: 43
Discharge: HOME | End: 2023-07-13
Attending: PHYSICIAN ASSISTANT
Payer: COMMERCIAL

## 2023-07-13 ENCOUNTER — OFFICE VISIT (OUTPATIENT)
Dept: INTERNAL MEDICINE | Facility: CLINIC | Age: 43
End: 2023-07-13
Payer: COMMERCIAL

## 2023-07-13 VITALS
SYSTOLIC BLOOD PRESSURE: 138 MMHG | HEIGHT: 66 IN | HEART RATE: 77 BPM | DIASTOLIC BLOOD PRESSURE: 88 MMHG | OXYGEN SATURATION: 99 % | WEIGHT: 223.31 LBS | BODY MASS INDEX: 35.89 KG/M2

## 2023-07-13 DIAGNOSIS — M79.641 RIGHT HAND PAIN: Primary | ICD-10-CM

## 2023-07-13 DIAGNOSIS — G89.29 CHRONIC PAIN OF RIGHT THUMB: ICD-10-CM

## 2023-07-13 DIAGNOSIS — E66.01 CLASS 2 SEVERE OBESITY DUE TO EXCESS CALORIES WITH SERIOUS COMORBIDITY AND BODY MASS INDEX (BMI) OF 36.0 TO 36.9 IN ADULT: ICD-10-CM

## 2023-07-13 DIAGNOSIS — M79.641 RIGHT HAND PAIN: ICD-10-CM

## 2023-07-13 DIAGNOSIS — M79.644 CHRONIC PAIN OF RIGHT THUMB: ICD-10-CM

## 2023-07-13 DIAGNOSIS — M79.644 THUMB PAIN, RIGHT: ICD-10-CM

## 2023-07-13 PROCEDURE — 3044F PR MOST RECENT HEMOGLOBIN A1C LEVEL <7.0%: ICD-10-PCS | Mod: CPTII,S$GLB,, | Performed by: PHYSICIAN ASSISTANT

## 2023-07-13 PROCEDURE — 1160F PR REVIEW ALL MEDS BY PRESCRIBER/CLIN PHARMACIST DOCUMENTED: ICD-10-PCS | Mod: CPTII,S$GLB,, | Performed by: PHYSICIAN ASSISTANT

## 2023-07-13 PROCEDURE — 3075F SYST BP GE 130 - 139MM HG: CPT | Mod: CPTII,S$GLB,, | Performed by: PHYSICIAN ASSISTANT

## 2023-07-13 PROCEDURE — 99999 PR PBB SHADOW E&M-EST. PATIENT-LVL V: CPT | Mod: PBBFAC,,, | Performed by: PHYSICIAN ASSISTANT

## 2023-07-13 PROCEDURE — 99999 PR PBB SHADOW E&M-EST. PATIENT-LVL V: ICD-10-PCS | Mod: PBBFAC,,, | Performed by: PHYSICIAN ASSISTANT

## 2023-07-13 PROCEDURE — 73130 X-RAY EXAM OF HAND: CPT | Mod: 26,RT,, | Performed by: RADIOLOGY

## 2023-07-13 PROCEDURE — 1159F MED LIST DOCD IN RCRD: CPT | Mod: CPTII,S$GLB,, | Performed by: PHYSICIAN ASSISTANT

## 2023-07-13 PROCEDURE — 1160F RVW MEDS BY RX/DR IN RCRD: CPT | Mod: CPTII,S$GLB,, | Performed by: PHYSICIAN ASSISTANT

## 2023-07-13 PROCEDURE — 73130 XR HAND COMPLETE 3 VIEW RIGHT: ICD-10-PCS | Mod: 26,RT,, | Performed by: RADIOLOGY

## 2023-07-13 PROCEDURE — 3079F PR MOST RECENT DIASTOLIC BLOOD PRESSURE 80-89 MM HG: ICD-10-PCS | Mod: CPTII,S$GLB,, | Performed by: PHYSICIAN ASSISTANT

## 2023-07-13 PROCEDURE — 3008F PR BODY MASS INDEX (BMI) DOCUMENTED: ICD-10-PCS | Mod: CPTII,S$GLB,, | Performed by: PHYSICIAN ASSISTANT

## 2023-07-13 PROCEDURE — 3008F BODY MASS INDEX DOCD: CPT | Mod: CPTII,S$GLB,, | Performed by: PHYSICIAN ASSISTANT

## 2023-07-13 PROCEDURE — 1159F PR MEDICATION LIST DOCUMENTED IN MEDICAL RECORD: ICD-10-PCS | Mod: CPTII,S$GLB,, | Performed by: PHYSICIAN ASSISTANT

## 2023-07-13 PROCEDURE — 99214 OFFICE O/P EST MOD 30 MIN: CPT | Mod: S$GLB,,, | Performed by: PHYSICIAN ASSISTANT

## 2023-07-13 PROCEDURE — 3075F PR MOST RECENT SYSTOLIC BLOOD PRESS GE 130-139MM HG: ICD-10-PCS | Mod: CPTII,S$GLB,, | Performed by: PHYSICIAN ASSISTANT

## 2023-07-13 PROCEDURE — 73130 X-RAY EXAM OF HAND: CPT | Mod: TC,RT

## 2023-07-13 PROCEDURE — 3044F HG A1C LEVEL LT 7.0%: CPT | Mod: CPTII,S$GLB,, | Performed by: PHYSICIAN ASSISTANT

## 2023-07-13 PROCEDURE — 3079F DIAST BP 80-89 MM HG: CPT | Mod: CPTII,S$GLB,, | Performed by: PHYSICIAN ASSISTANT

## 2023-07-13 PROCEDURE — 99214 PR OFFICE/OUTPT VISIT, EST, LEVL IV, 30-39 MIN: ICD-10-PCS | Mod: S$GLB,,, | Performed by: PHYSICIAN ASSISTANT

## 2023-07-13 RX ORDER — IBUPROFEN 600 MG/1
600 TABLET ORAL EVERY 8 HOURS PRN
Qty: 30 TABLET | Refills: 1 | Status: SHIPPED | OUTPATIENT
Start: 2023-07-13 | End: 2023-12-31 | Stop reason: SDUPTHER

## 2023-07-13 NOTE — PROGRESS NOTES
"Subjective     Patient ID: Cristina Man is a 43 y.o. female.    Chief Complaint: Hand Pain (right)    HPI    Established pt of Madelaine Hayden MD     Same day/urgent care visit.     Here with concerns of right hand pain for the past 3 weeks, mainly of right thumb, thenar region, and radial wrist. Aching, throbbing. No reported injury. She has tried OTC analgesic, brace and tylenol without much improvement. She is left handed but uses her right hand frequent. She works with [employer], often checking meters.     Inquires about weight loss meds.     Past Medical History:   Diagnosis Date    Abnormal Pap smear of cervix     Allergic rhinitis due to allergen     Asthma at age 4     Social History     Tobacco Use    Smoking status: Never    Smokeless tobacco: Never   Substance Use Topics    Alcohol use: Yes     Comment: occ    Drug use: No     Review of patient's allergies indicates:  No Known Allergies    Review of Systems   Constitutional:  Negative for chills, fever and unexpected weight change.   Respiratory:  Negative for cough and shortness of breath.    Cardiovascular:  Negative for chest pain and leg swelling.   Gastrointestinal:  Negative for abdominal pain, nausea and vomiting.   Integumentary:  Negative for rash.   Neurological:  Negative for weakness and headaches.        Objective  /88 (BP Location: Right arm, Patient Position: Sitting, BP Method: Large (Manual))   Pulse 77   Ht 5' 6" (1.676 m)   Wt 101.3 kg (223 lb 5.2 oz)   LMP 04/27/2015   SpO2 99%   BMI 36.05 kg/m²       Physical Exam  Vitals reviewed.   Constitutional:       General: She is not in acute distress.     Appearance: She is well-developed.   HENT:      Head: Normocephalic and atraumatic.   Pulmonary:      Effort: Pulmonary effort is normal. No respiratory distress.   Musculoskeletal:      Right hand: Tenderness (base of thumb, radial wrist) present. No swelling or deformity. Decreased range of motion. Normal sensation. Normal capillary " refill. Normal pulse.      Left hand: Normal capillary refill. Normal pulse.      Comments: +Finkelstein Right   Skin:     General: Skin is warm and dry.      Findings: No rash.   Neurological:      Mental Status: She is alert.          Assessment and Plan     1. Right hand pain  -     X-Ray Hand Complete Right; Future; Expected date: 07/13/2023  -     ibuprofen (ADVIL,MOTRIN) 600 MG tablet; Take 1 tablet (600 mg total) by mouth every 8 (eight) hours as needed for Pain.  Dispense: 30 tablet; Refill: 1  -     Ambulatory referral/consult to Orthopedics; Future; Expected date: 07/20/2023    2. Thumb pain, right  -     X-Ray Hand Complete Right; Future; Expected date: 07/13/2023  -     ibuprofen (ADVIL,MOTRIN) 600 MG tablet; Take 1 tablet (600 mg total) by mouth every 8 (eight) hours as needed for Pain.  Dispense: 30 tablet; Refill: 1  -     Ambulatory referral/consult to Orthopedics; Future; Expected date: 07/20/2023    3. Class 2 severe obesity due to excess calories with serious comorbidity and body mass index (BMI) of 36.0 to 36.9 in adult  -     Ambulatory referral/consult to Bariatric Medicine; Future; Expected date: 07/20/2023      Patient Instructions   Xray today    Ibuprofen three time a day for 5 days then as needed    Ice    Wrist/thumb brace         [facility] follow up.       Saadia Strong PA-C

## 2023-07-13 NOTE — PATIENT INSTRUCTIONS
Xray today    Ibuprofen three time a day for 5 days then as needed    Ice    Wrist/thumb brace         [facility] follow up.

## 2023-07-18 ENCOUNTER — TELEPHONE (OUTPATIENT)
Dept: SURGERY | Facility: CLINIC | Age: 43
End: 2023-07-18
Payer: COMMERCIAL

## 2023-07-24 ENCOUNTER — TELEPHONE (OUTPATIENT)
Dept: ORTHOPEDICS | Facility: CLINIC | Age: 43
End: 2023-07-24
Payer: COMMERCIAL

## 2023-07-25 ENCOUNTER — OFFICE VISIT (OUTPATIENT)
Dept: ORTHOPEDICS | Facility: CLINIC | Age: 43
End: 2023-07-25
Payer: COMMERCIAL

## 2023-07-25 VITALS
HEIGHT: 66 IN | HEART RATE: 80 BPM | SYSTOLIC BLOOD PRESSURE: 129 MMHG | WEIGHT: 223 LBS | BODY MASS INDEX: 35.84 KG/M2 | DIASTOLIC BLOOD PRESSURE: 86 MMHG

## 2023-07-25 DIAGNOSIS — M18.11 ARTHRITIS OF CARPOMETACARPAL (CMC) JOINT OF RIGHT THUMB: ICD-10-CM

## 2023-07-25 DIAGNOSIS — M65.311 TRIGGER THUMB, RIGHT THUMB: ICD-10-CM

## 2023-07-25 PROCEDURE — 3008F BODY MASS INDEX DOCD: CPT | Mod: CPTII,S$GLB,, | Performed by: PHYSICIAN ASSISTANT

## 2023-07-25 PROCEDURE — 20600 PR DRAIN/INJECT SMALL JOINT/BURSA: ICD-10-PCS | Mod: 51,XS,RT,S$GLB | Performed by: PHYSICIAN ASSISTANT

## 2023-07-25 PROCEDURE — 99999 PR PBB SHADOW E&M-EST. PATIENT-LVL III: CPT | Mod: PBBFAC,,, | Performed by: PHYSICIAN ASSISTANT

## 2023-07-25 PROCEDURE — 3044F HG A1C LEVEL LT 7.0%: CPT | Mod: CPTII,S$GLB,, | Performed by: PHYSICIAN ASSISTANT

## 2023-07-25 PROCEDURE — 1159F MED LIST DOCD IN RCRD: CPT | Mod: CPTII,S$GLB,, | Performed by: PHYSICIAN ASSISTANT

## 2023-07-25 PROCEDURE — 20550 PR INJECT TENDON SHEATH/LIGAMENT: ICD-10-PCS | Mod: RT,S$GLB,, | Performed by: PHYSICIAN ASSISTANT

## 2023-07-25 PROCEDURE — 20600 DRAIN/INJ JOINT/BURSA W/O US: CPT | Mod: 51,XS,RT,S$GLB | Performed by: PHYSICIAN ASSISTANT

## 2023-07-25 PROCEDURE — 3074F PR MOST RECENT SYSTOLIC BLOOD PRESSURE < 130 MM HG: ICD-10-PCS | Mod: CPTII,S$GLB,, | Performed by: PHYSICIAN ASSISTANT

## 2023-07-25 PROCEDURE — 99999 PR PBB SHADOW E&M-EST. PATIENT-LVL III: ICD-10-PCS | Mod: PBBFAC,,, | Performed by: PHYSICIAN ASSISTANT

## 2023-07-25 PROCEDURE — 3008F PR BODY MASS INDEX (BMI) DOCUMENTED: ICD-10-PCS | Mod: CPTII,S$GLB,, | Performed by: PHYSICIAN ASSISTANT

## 2023-07-25 PROCEDURE — 1159F PR MEDICATION LIST DOCUMENTED IN MEDICAL RECORD: ICD-10-PCS | Mod: CPTII,S$GLB,, | Performed by: PHYSICIAN ASSISTANT

## 2023-07-25 PROCEDURE — 3044F PR MOST RECENT HEMOGLOBIN A1C LEVEL <7.0%: ICD-10-PCS | Mod: CPTII,S$GLB,, | Performed by: PHYSICIAN ASSISTANT

## 2023-07-25 PROCEDURE — 3074F SYST BP LT 130 MM HG: CPT | Mod: CPTII,S$GLB,, | Performed by: PHYSICIAN ASSISTANT

## 2023-07-25 PROCEDURE — 3079F PR MOST RECENT DIASTOLIC BLOOD PRESSURE 80-89 MM HG: ICD-10-PCS | Mod: CPTII,S$GLB,, | Performed by: PHYSICIAN ASSISTANT

## 2023-07-25 PROCEDURE — 99204 PR OFFICE/OUTPT VISIT, NEW, LEVL IV, 45-59 MIN: ICD-10-PCS | Mod: 25,S$GLB,, | Performed by: PHYSICIAN ASSISTANT

## 2023-07-25 PROCEDURE — 99204 OFFICE O/P NEW MOD 45 MIN: CPT | Mod: 25,S$GLB,, | Performed by: PHYSICIAN ASSISTANT

## 2023-07-25 PROCEDURE — 20550 NJX 1 TENDON SHEATH/LIGAMENT: CPT | Mod: RT,S$GLB,, | Performed by: PHYSICIAN ASSISTANT

## 2023-07-25 PROCEDURE — 3079F DIAST BP 80-89 MM HG: CPT | Mod: CPTII,S$GLB,, | Performed by: PHYSICIAN ASSISTANT

## 2023-07-25 RX ORDER — DEXAMETHASONE SODIUM PHOSPHATE 4 MG/ML
4 INJECTION, SOLUTION INTRA-ARTICULAR; INTRALESIONAL; INTRAMUSCULAR; INTRAVENOUS; SOFT TISSUE
Status: COMPLETED | OUTPATIENT
Start: 2023-07-25 | End: 2023-07-25

## 2023-07-25 RX ORDER — LIDOCAINE HYDROCHLORIDE 10 MG/ML
0.5 INJECTION, SOLUTION EPIDURAL; INFILTRATION; INTRACAUDAL; PERINEURAL ONCE
Status: COMPLETED | OUTPATIENT
Start: 2023-07-25 | End: 2023-07-25

## 2023-07-25 RX ADMIN — DEXAMETHASONE SODIUM PHOSPHATE 4 MG: 4 INJECTION, SOLUTION INTRA-ARTICULAR; INTRALESIONAL; INTRAMUSCULAR; INTRAVENOUS; SOFT TISSUE at 03:07

## 2023-07-25 RX ADMIN — LIDOCAINE HYDROCHLORIDE 5 MG: 10 INJECTION, SOLUTION EPIDURAL; INFILTRATION; INTRACAUDAL; PERINEURAL at 04:07

## 2023-07-25 NOTE — PROGRESS NOTES
Subjective:      Patient ID: Cristina Man is a 43 y.o. female.    Chief Complaint: Pain of the Right Hand      HPI  Cristina Man is a  43 y.o. female presenting today for evaluation of the right hand. Onset several months ago. She complains of pain greatest around the right thumb including at the thumb base, thenar eminence, and A1 pulley. No triggering but has occasional stiffness. Pain is increased with use and if bumping the thumb. She has occasional paresthesias.       Review of patient's allergies indicates:  No Known Allergies      Current Outpatient Medications   Medication Sig Dispense Refill    albuterol (PROVENTIL/VENTOLIN HFA) 90 mcg/actuation inhaler Inhale 2 puffs into the lungs every 6 (six) hours as needed for Wheezing. Rescue 18 g 3    amLODIPine (NORVASC) 5 MG tablet Take 1 tablet (5 mg total) by mouth once daily. 90 tablet 3    azelastine (ASTELIN) 137 mcg (0.1 %) nasal spray 1 spray (137 mcg total) by Nasal route 2 (two) times daily. 30 mL 3    ergocalciferol (ERGOCALCIFEROL) 50,000 unit Cap Take 1 capsule (50,000 Units total) by mouth every 7 days. 12 capsule 3    estradioL (ESTRACE) 0.01 % (0.1 mg/gram) vaginal cream Place 0.5 g vaginally twice a week. 42.5 g 11    fluticasone propionate (FLONASE) 50 mcg/actuation nasal spray 1 spray (50 mcg total) by Each Nostril route once daily. 16 g 3    fluticasone-salmeterol diskus inhaler 250-50 mcg Inhale 1 puff into the lungs 2 (two) times daily. Controller 14 each 3    ibuprofen (ADVIL,MOTRIN) 600 MG tablet Take 1 tablet (600 mg total) by mouth every 8 (eight) hours as needed for Pain. 30 tablet 1    levocetirizine (XYZAL) 5 MG tablet Take 1 tablet (5 mg total) by mouth every evening. 90 tablet 3    solifenacin (VESICARE) 10 MG tablet Take 1 tablet (10 mg total) by mouth once daily. 30 tablet 11    tiZANidine (ZANAFLEX) 4 MG tablet TAKE 1 TABLET(4 MG) BY MOUTH EVERY NIGHT AS NEEDED 20 tablet 0     Current Facility-Administered Medications  "  Medication Dose Route Frequency Provider Last Rate Last Admin    [COMPLETED] dexAMETHasone injection 4 mg  4 mg Other 1 time in Clinic/HOD Tova Kitchen PA-C   4 mg at 07/25/23 1500    [COMPLETED] dexAMETHasone injection 4 mg  4 mg Other 1 time in Clinic/HOD Tova Kitchen PA-C   4 mg at 07/25/23 1500    [COMPLETED] LIDOcaine (PF) 10 mg/ml (1%) injection 5 mg  0.5 mL Other Once Tova Kitchen PA-C   5 mg at 07/25/23 1600    [COMPLETED] LIDOcaine (PF) 10 mg/ml (1%) injection 5 mg  0.5 mL Other Once Tova Kitchen PA-C   5 mg at 07/25/23 1600     Facility-Administered Medications Ordered in Other Visits   Medication Dose Route Frequency Provider Last Rate Last Admin    EPINEPHrine 1,000 mcg in lactated Ringers 1,000 mL irrigation   Irrigation On Call Procedure Marvin Levine MD        EPINEPHrine 1,000 mcg in lactated Ringers 1,000 mL irrigation   Irrigation On Call Procedure Marvin Levine MD           Past Medical History:   Diagnosis Date    Abnormal Pap smear of cervix     Allergic rhinitis due to allergen     Asthma at age 4       Past Surgical History:   Procedure Laterality Date    HEMORRHOID SURGERY  2009    HYSTERECTOMY  2015    REDUCTION OF BOTH BREASTS Bilateral 06/12/2020    Procedure: MAMMOPLASTY, REDUCTION, BILATERAL;  Surgeon: Marvin Levine MD;  Location: [facility];  Service: Plastics;  Laterality: Bilateral;    TOTAL REDUCTION MAMMOPLASTY         Review of Systems:  Constitutional: Negative for chills and fever.   Respiratory: Negative for cough and shortness of breath.    Gastrointestinal: Negative for nausea and vomiting.   Skin: Negative for rash.   Neurological: Negative for dizziness and headaches.   Psychiatric/Behavioral: Negative for depression.   MSK as in HPI       OBJECTIVE:     PHYSICAL EXAM:  /86   Pulse 80   Ht 5' 6" (1.676 m)   Wt 101.2 kg (223 lb)   LMP 04/27/2015   BMI 35.99 kg/m²     GEN:  NAD, well-developed, well-groomed.  NEURO: Awake, alert, and oriented. " Normal attention and concentration.    PSYCH: Normal mood and affect. Behavior is normal.  HEENT: No cervical lymphadenopathy noted.  CARDIOVASCULAR: Radial pulses 2+ bilaterally. No LE edema noted.  PULMONARY: Breath sounds normal. No respiratory distress.  SKIN: Intact, no rashes.      MSK:   RUE:  Good active ROM of the wrist and fingers. Ttp thumb base and thumb A1 pulley no triggering.AIN/PIN/Radial/Median/Ulnar Nerves assessed in isolation without deficit. Radial & Ulnar arteries palpated 2+. Capillary Refill <3s.    RADIOGRAPHS:  Right hand 7/13/23   Impression:     1. No convincing acute displaced fracture or dislocation of the hand.  Comments: I have personally reviewed the imaging and I agree with the above radiologist's report.    ASSESSMENT/PLAN:       ICD-10-CM ICD-9-CM   1. Trigger thumb, right thumb  M65.311 727.03   2. Arthritis of carpometacarpal (CMC) joint of right thumb  M18.11 716.94       Orders Placed This Encounter    LIDOcaine (PF) 10 mg/ml (1%) injection 5 mg    dexAMETHasone injection 4 mg    LIDOcaine (PF) 10 mg/ml (1%) injection 5 mg    dexAMETHasone injection 4 mg     No orders of the defined types were placed in this encounter.       Plan:   Treatment options discussed. She has pain at the thumb CMC and A1 pulley. She wishes to proceed with injections for both. Also recommend bracing, therapy, anti inflammatory diet, paraffin.   RTC if symptoms persist or worsen       PROCEDURE:  I have explained the risks, benefits, and alternatives of the procedure in detail.  The patient voices understanding and all questions have been answered. Verbal consent obtained. Fluoro used for CMC. The patient agrees to proceed as planned. So after a sterile prep of the skin in the normal fashion the right thumb CMC and thumb flexor tendon sheath is injected using a 25 gauge needle with a combination of 0.5cc 1% plain lidocaine and 4 mg of dexamethasone.  The patient is cautioned and immediate relief of  pain is secondary to the local anesthetic and will be temporary.  After the anesthetic wears off there may be a increase in pain that may last for a few hours or a few days and they should use ice to help alleviate this flair up of pain. Patient tolerated the procedure well.       The patient indicates understanding of these issues and agrees to the plan.    Tova Kitchen PA-C  [facility]   [facility]  [place], [place]

## 2023-09-05 ENCOUNTER — TELEPHONE (OUTPATIENT)
Dept: ORTHOPEDICS | Facility: CLINIC | Age: 43
End: 2023-09-05
Payer: COMMERCIAL

## 2023-09-05 NOTE — PROGRESS NOTES
Subjective:      Patient ID: Cristina Man is a 43 y.o. female.    Chief Complaint: No chief complaint on file.      HPI  Cristina Man is a  43 y.o. female presenting today for evaluation of the right hand. Onset several months ago. She complains of pain greatest around the right thumb including at the thumb base, thenar eminence, and A1 pulley. No triggering but has occasional stiffness. Pain is increased with use and if bumping the thumb. She has occasional paresthesias.     9/6/23   F/u right thumb trigger finger and CMC arthritis. At last visit injections performed for both trigger thumb and CMC arthritis. She reports no notable relief from injections, maybe slight relief of pain at the A1 pulley. Pain is greatest at the thumb base. She has also tried bracing and oral anti-inflammatories without significant relief.       Review of patient's allergies indicates:  No Known Allergies      Current Outpatient Medications   Medication Sig Dispense Refill    albuterol (PROVENTIL/VENTOLIN HFA) 90 mcg/actuation inhaler Inhale 2 puffs into the lungs every 6 (six) hours as needed for Wheezing. Rescue 18 g 3    amLODIPine (NORVASC) 5 MG tablet Take 1 tablet (5 mg total) by mouth once daily. 90 tablet 3    azelastine (ASTELIN) 137 mcg (0.1 %) nasal spray 1 spray (137 mcg total) by Nasal route 2 (two) times daily. 30 mL 3    ergocalciferol (ERGOCALCIFEROL) 50,000 unit Cap Take 1 capsule (50,000 Units total) by mouth every 7 days. 12 capsule 3    estradioL (ESTRACE) 0.01 % (0.1 mg/gram) vaginal cream Place 0.5 g vaginally twice a week. 42.5 g 11    fluticasone propionate (FLONASE) 50 mcg/actuation nasal spray 1 spray (50 mcg total) by Each Nostril route once daily. 16 g 3    fluticasone-salmeterol diskus inhaler 250-50 mcg Inhale 1 puff into the lungs 2 (two) times daily. Controller 14 each 3    ibuprofen (ADVIL,MOTRIN) 600 MG tablet Take 1 tablet (600 mg total) by mouth every 8 (eight) hours as needed for Pain. 30 tablet 1  "   levocetirizine (XYZAL) 5 MG tablet Take 1 tablet (5 mg total) by mouth every evening. 90 tablet 3    solifenacin (VESICARE) 10 MG tablet Take 1 tablet (10 mg total) by mouth once daily. 30 tablet 11    tiZANidine (ZANAFLEX) 4 MG tablet TAKE 1 TABLET(4 MG) BY MOUTH EVERY NIGHT AS NEEDED 20 tablet 0    LIDOcaine-prilocaine (EMLA) cream Apply topically 2 (two) times daily as needed. 60 g 5     No current facility-administered medications for this visit.     Facility-Administered Medications Ordered in Other Visits   Medication Dose Route Frequency Provider Last Rate Last Admin    EPINEPHrine 1,000 mcg in lactated Ringers 1,000 mL irrigation   Irrigation On Call Procedure Marvin Levine MD        EPINEPHrine 1,000 mcg in lactated Ringers 1,000 mL irrigation   Irrigation On Call Procedure Marvin Levine MD           Past Medical History:   Diagnosis Date    Abnormal Pap smear of cervix     Allergic rhinitis due to allergen     Asthma at age 4       Past Surgical History:   Procedure Laterality Date    HEMORRHOID SURGERY  2009    HYSTERECTOMY  2015    REDUCTION OF BOTH BREASTS Bilateral 06/12/2020    Procedure: MAMMOPLASTY, REDUCTION, BILATERAL;  Surgeon: Marvin Levine MD;  Location: [facility];  Service: Plastics;  Laterality: Bilateral;    TOTAL REDUCTION MAMMOPLASTY         Review of Systems:  Constitutional: Negative for chills and fever.   Respiratory: Negative for cough and shortness of breath.    Gastrointestinal: Negative for nausea and vomiting.   Skin: Negative for rash.   Neurological: Negative for dizziness and headaches.   Psychiatric/Behavioral: Negative for depression.   MSK as in HPI       OBJECTIVE:     PHYSICAL EXAM:  Ht 5' 6" (1.676 m)   Wt 101.2 kg (223 lb 1.7 oz)   LMP 04/27/2015   BMI 36.01 kg/m²     GEN:  NAD, well-developed, well-groomed.  NEURO: Awake, alert, and oriented. Normal attention and concentration.    PSYCH: Normal mood and affect. Behavior is normal.  HEENT: No " cervical lymphadenopathy noted.  CARDIOVASCULAR: Radial pulses 2+ bilaterally. No LE edema noted.  PULMONARY: Breath sounds normal. No respiratory distress.  SKIN: Intact, no rashes.      MSK:   RUE:  Good active ROM of the wrist and fingers. Ttp thumb base and thumb A1 pulley no triggering.AIN/PIN/Radial/Median/Ulnar Nerves assessed in isolation without deficit. Radial & Ulnar arteries palpated 2+. Capillary Refill <3s.    RADIOGRAPHS:  Right hand 7/13/23   Impression:     1. No convincing acute displaced fracture or dislocation of the hand.  Comments: I have personally reviewed the imaging and I agree with the above radiologist's report.    ASSESSMENT/PLAN:       ICD-10-CM ICD-9-CM   1. Arthritis of carpometacarpal (CMC) joint of right thumb  M18.11 716.94   2. Trigger thumb, right thumb  M65.311 727.03         Orders Placed This Encounter    LIDOcaine-prilocaine (EMLA) cream       No orders of the defined types were placed in this encounter.       Plan:   Treatment options discussed. We discussed possible repeat injection of the thumb CMC using kenalog vs surgical options. Discussed surgery- R trigger thumb release and she may be a candidate for R thumb CMC arthroscopy. We will have her follow up with Dr. Collazo to discuss options.       The patient indicates understanding of these issues and agrees to the plan.    Tova Kitchen PA-C  [facility]   [facility]  [place], [place]

## 2023-09-06 ENCOUNTER — OFFICE VISIT (OUTPATIENT)
Dept: ORTHOPEDICS | Facility: CLINIC | Age: 43
End: 2023-09-06
Payer: COMMERCIAL

## 2023-09-06 VITALS — BODY MASS INDEX: 35.86 KG/M2 | HEIGHT: 66 IN | WEIGHT: 223.13 LBS

## 2023-09-06 DIAGNOSIS — M65.311 TRIGGER THUMB, RIGHT THUMB: ICD-10-CM

## 2023-09-06 DIAGNOSIS — M18.11 ARTHRITIS OF CARPOMETACARPAL (CMC) JOINT OF RIGHT THUMB: Primary | ICD-10-CM

## 2023-09-06 PROCEDURE — 99999 PR PBB SHADOW E&M-EST. PATIENT-LVL III: ICD-10-PCS | Mod: PBBFAC,,, | Performed by: PHYSICIAN ASSISTANT

## 2023-09-06 PROCEDURE — 1159F MED LIST DOCD IN RCRD: CPT | Mod: CPTII,S$GLB,, | Performed by: PHYSICIAN ASSISTANT

## 2023-09-06 PROCEDURE — 99214 PR OFFICE/OUTPT VISIT, EST, LEVL IV, 30-39 MIN: ICD-10-PCS | Mod: S$GLB,,, | Performed by: PHYSICIAN ASSISTANT

## 2023-09-06 PROCEDURE — 3044F HG A1C LEVEL LT 7.0%: CPT | Mod: CPTII,S$GLB,, | Performed by: PHYSICIAN ASSISTANT

## 2023-09-06 PROCEDURE — 1159F PR MEDICATION LIST DOCUMENTED IN MEDICAL RECORD: ICD-10-PCS | Mod: CPTII,S$GLB,, | Performed by: PHYSICIAN ASSISTANT

## 2023-09-06 PROCEDURE — 99999 PR PBB SHADOW E&M-EST. PATIENT-LVL III: CPT | Mod: PBBFAC,,, | Performed by: PHYSICIAN ASSISTANT

## 2023-09-06 PROCEDURE — 3008F BODY MASS INDEX DOCD: CPT | Mod: CPTII,S$GLB,, | Performed by: PHYSICIAN ASSISTANT

## 2023-09-06 PROCEDURE — 99214 OFFICE O/P EST MOD 30 MIN: CPT | Mod: S$GLB,,, | Performed by: PHYSICIAN ASSISTANT

## 2023-09-06 PROCEDURE — 3008F PR BODY MASS INDEX (BMI) DOCUMENTED: ICD-10-PCS | Mod: CPTII,S$GLB,, | Performed by: PHYSICIAN ASSISTANT

## 2023-09-06 PROCEDURE — 3044F PR MOST RECENT HEMOGLOBIN A1C LEVEL <7.0%: ICD-10-PCS | Mod: CPTII,S$GLB,, | Performed by: PHYSICIAN ASSISTANT

## 2023-09-06 RX ORDER — LIDOCAINE AND PRILOCAINE 25; 25 MG/G; MG/G
CREAM TOPICAL 2 TIMES DAILY PRN
Qty: 60 G | Refills: 5 | Status: SHIPPED | OUTPATIENT
Start: 2023-09-06

## 2023-09-08 ENCOUNTER — OFFICE VISIT (OUTPATIENT)
Dept: OBSTETRICS AND GYNECOLOGY | Facility: CLINIC | Age: 43
End: 2023-09-08
Attending: OBSTETRICS & GYNECOLOGY
Payer: COMMERCIAL

## 2023-09-08 VITALS
DIASTOLIC BLOOD PRESSURE: 78 MMHG | WEIGHT: 219.81 LBS | BODY MASS INDEX: 35.48 KG/M2 | SYSTOLIC BLOOD PRESSURE: 132 MMHG

## 2023-09-08 DIAGNOSIS — Z12.31 VISIT FOR SCREENING MAMMOGRAM: ICD-10-CM

## 2023-09-08 DIAGNOSIS — Z90.710 HISTORY OF HYSTERECTOMY: ICD-10-CM

## 2023-09-08 DIAGNOSIS — Z01.419 WOMEN'S ANNUAL ROUTINE GYNECOLOGICAL EXAMINATION: Primary | ICD-10-CM

## 2023-09-08 PROCEDURE — 99396 PREV VISIT EST AGE 40-64: CPT | Mod: S$GLB,,, | Performed by: OBSTETRICS & GYNECOLOGY

## 2023-09-08 PROCEDURE — 3044F PR MOST RECENT HEMOGLOBIN A1C LEVEL <7.0%: ICD-10-PCS | Mod: CPTII,S$GLB,, | Performed by: OBSTETRICS & GYNECOLOGY

## 2023-09-08 PROCEDURE — 3078F DIAST BP <80 MM HG: CPT | Mod: CPTII,S$GLB,, | Performed by: OBSTETRICS & GYNECOLOGY

## 2023-09-08 PROCEDURE — 3075F PR MOST RECENT SYSTOLIC BLOOD PRESS GE 130-139MM HG: ICD-10-PCS | Mod: CPTII,S$GLB,, | Performed by: OBSTETRICS & GYNECOLOGY

## 2023-09-08 PROCEDURE — 1159F MED LIST DOCD IN RCRD: CPT | Mod: CPTII,S$GLB,, | Performed by: OBSTETRICS & GYNECOLOGY

## 2023-09-08 PROCEDURE — 3008F BODY MASS INDEX DOCD: CPT | Mod: CPTII,S$GLB,, | Performed by: OBSTETRICS & GYNECOLOGY

## 2023-09-08 PROCEDURE — 99396 PR PREVENTIVE VISIT,EST,40-64: ICD-10-PCS | Mod: S$GLB,,, | Performed by: OBSTETRICS & GYNECOLOGY

## 2023-09-08 PROCEDURE — 3078F PR MOST RECENT DIASTOLIC BLOOD PRESSURE < 80 MM HG: ICD-10-PCS | Mod: CPTII,S$GLB,, | Performed by: OBSTETRICS & GYNECOLOGY

## 2023-09-08 PROCEDURE — 1160F PR REVIEW ALL MEDS BY PRESCRIBER/CLIN PHARMACIST DOCUMENTED: ICD-10-PCS | Mod: CPTII,S$GLB,, | Performed by: OBSTETRICS & GYNECOLOGY

## 2023-09-08 PROCEDURE — 1159F PR MEDICATION LIST DOCUMENTED IN MEDICAL RECORD: ICD-10-PCS | Mod: CPTII,S$GLB,, | Performed by: OBSTETRICS & GYNECOLOGY

## 2023-09-08 PROCEDURE — 3075F SYST BP GE 130 - 139MM HG: CPT | Mod: CPTII,S$GLB,, | Performed by: OBSTETRICS & GYNECOLOGY

## 2023-09-08 PROCEDURE — 3008F PR BODY MASS INDEX (BMI) DOCUMENTED: ICD-10-PCS | Mod: CPTII,S$GLB,, | Performed by: OBSTETRICS & GYNECOLOGY

## 2023-09-08 PROCEDURE — 99999 PR PBB SHADOW E&M-EST. PATIENT-LVL III: ICD-10-PCS | Mod: PBBFAC,,, | Performed by: OBSTETRICS & GYNECOLOGY

## 2023-09-08 PROCEDURE — 3044F HG A1C LEVEL LT 7.0%: CPT | Mod: CPTII,S$GLB,, | Performed by: OBSTETRICS & GYNECOLOGY

## 2023-09-08 PROCEDURE — 1160F RVW MEDS BY RX/DR IN RCRD: CPT | Mod: CPTII,S$GLB,, | Performed by: OBSTETRICS & GYNECOLOGY

## 2023-09-08 PROCEDURE — 99999 PR PBB SHADOW E&M-EST. PATIENT-LVL III: CPT | Mod: PBBFAC,,, | Performed by: OBSTETRICS & GYNECOLOGY

## 2023-09-08 RX ORDER — KETOROLAC TROMETHAMINE 4 MG/ML
SOLUTION/ DROPS OPHTHALMIC
COMMUNITY
Start: 2023-04-03

## 2023-09-08 RX ORDER — NEOMYCIN SULFATE, POLYMYXIN B SULFATE AND DEXAMETHASONE 3.5; 10000; 1 MG/ML; [USP'U]/ML; MG/ML
SUSPENSION/ DROPS OPHTHALMIC
COMMUNITY
Start: 2023-04-03

## 2023-09-08 NOTE — PROGRESS NOTES
Cristina Man is a 43 y.o. year old  who presents for annual exam.  She is S/P [employer]  and is not on hormones.  Denies bleeding but notes occasional sweats at night.  Several weeks ago, she noted a few tender bumps in her left groin / mons which have now resolved.  Denies recent changes in the medical / surgical history.  No GYN complaints.    Blood pressure 132/78, weight 99.7 kg (219 lb 12.8 oz), last menstrual period 2015.    22 Pap: Negative    22 Mammogram: Negative, TC: 9.26%    Past Medical History:   Diagnosis Date    Abnormal Pap smear of cervix     Allergic rhinitis due to allergen     Asthma at age 4       Past Surgical History:   Procedure Laterality Date    HEMORRHOID SURGERY      HYSTERECTOMY      REDUCTION OF BOTH BREASTS Bilateral 2020    Procedure: MAMMOPLASTY, REDUCTION, BILATERAL;  Surgeon: Marvin Levine MD;  Location: [facility];  Service: Plastics;  Laterality: Bilateral;    TOTAL REDUCTION MAMMOPLASTY         OB History          2    Para   2    Term   2            AB        Living             SAB        IAB        Ectopic        Multiple        Live Births                     ROS:  GENERAL: Feeling well overall.   SKIN: Reports tender bumps in left groin that have now resolved.  HEAD: Denies head injury or headache.   NODES: Denies enlarged lymph nodes.   CHEST: Denies chest pain or shortness of breath.   CARDIOVASCULAR: Denies palpitations or left sided chest pain.   ABDOMEN: No abdominal pain, nausea, vomiting or rectal bleeding.   URINARY: No dysuria or hematuria.  REPRODUCTIVE: See HPI.   BREASTS: Denies pain, lumps, or nipple discharge.   HEMATOLOGIC: No easy bruisability or excessive bleeding.   MUSCULOSKELETAL: Denies joint pain or swelling.   NEUROLOGIC: Denies syncope or weakness.   PSYCHIATRIC: Denies depression.    PE:  (chaperone present during entire exam)  APPEARANCE: Well nourished, well developed, in no acute  distress.  NODES: No inguinal lymph node enlargement.  ABDOMEN: Soft. No tenderness or masses. No hernias.  BREASTS: Symmetrical.  S/P bilateral reduction.  No palpable masses, nipple discharge or adenopathy bilaterally.  PELVIC: Normal external female genitalia without lesions. Normal hair distribution. Adequate perineal body, normal urethral meatus. Vagina without lesions or discharge. No significant cystocele or rectocele. Uterus and cervix surgically absent. Bimanual exam revealed no masses, tenderness or abnormality.  ANUS: Normal.    Diagnosis:  1. Women's annual routine gynecological examination    2. History of hysterectomy    3. Visit for screening mammogram          PLAN:    Orders Placed This Encounter    Mammo Digital Screening Bilat w/ [facility]       Patient was counseled today on aixa / postmenopausal issues.  On exam today, no skin lesions were noted in her groin.  She will contact us if these tender lesions in groin return.    Follow-up in 1 year.    This note was created with voice recognition software.  Grammatical, syntax and spelling errors may be inevitable.

## 2023-09-14 ENCOUNTER — OFFICE VISIT (OUTPATIENT)
Dept: ORTHOPEDICS | Facility: CLINIC | Age: 43
End: 2023-09-14
Payer: COMMERCIAL

## 2023-09-14 VITALS — HEIGHT: 66 IN | BODY MASS INDEX: 35.32 KG/M2 | WEIGHT: 219.81 LBS

## 2023-09-14 DIAGNOSIS — M18.11 ARTHRITIS OF CARPOMETACARPAL (CMC) JOINT OF RIGHT THUMB: Primary | ICD-10-CM

## 2023-09-14 PROCEDURE — 99999 PR PBB SHADOW E&M-EST. PATIENT-LVL III: ICD-10-PCS | Mod: PBBFAC,,, | Performed by: ORTHOPAEDIC SURGERY

## 2023-09-14 PROCEDURE — 3008F BODY MASS INDEX DOCD: CPT | Mod: CPTII,S$GLB,,

## 2023-09-14 PROCEDURE — 99999 PR PBB SHADOW E&M-EST. PATIENT-LVL III: CPT | Mod: PBBFAC,,, | Performed by: ORTHOPAEDIC SURGERY

## 2023-09-14 PROCEDURE — 99214 PR OFFICE/OUTPT VISIT, EST, LEVL IV, 30-39 MIN: ICD-10-PCS | Mod: S$GLB,,,

## 2023-09-14 PROCEDURE — 3044F PR MOST RECENT HEMOGLOBIN A1C LEVEL <7.0%: ICD-10-PCS | Mod: CPTII,S$GLB,,

## 2023-09-14 PROCEDURE — 1159F MED LIST DOCD IN RCRD: CPT | Mod: CPTII,S$GLB,,

## 2023-09-14 PROCEDURE — 99214 OFFICE O/P EST MOD 30 MIN: CPT | Mod: S$GLB,,,

## 2023-09-14 PROCEDURE — 3044F HG A1C LEVEL LT 7.0%: CPT | Mod: CPTII,S$GLB,,

## 2023-09-14 PROCEDURE — 1159F PR MEDICATION LIST DOCUMENTED IN MEDICAL RECORD: ICD-10-PCS | Mod: CPTII,S$GLB,,

## 2023-09-14 PROCEDURE — 3008F PR BODY MASS INDEX (BMI) DOCUMENTED: ICD-10-PCS | Mod: CPTII,S$GLB,,

## 2023-09-14 RX ADMIN — DEXAMETHASONE SODIUM PHOSPHATE 4 MG: 4 INJECTION, SOLUTION INTRA-ARTICULAR; INTRALESIONAL; INTRAMUSCULAR; INTRAVENOUS; SOFT TISSUE at 08:09

## 2023-09-14 NOTE — PROGRESS NOTES
Subjective:      Patient ID: Cristina Man is a 43 y.o. female.    Chief Complaint: Pain of the Right Hand      HPI  Cristina Man is a  43 y.o. female presenting today for evaluation of the right hand. Onset several months ago. She complains of pain greatest around the right thumb including at the thumb base, thenar eminence. No triggering but has occasional stiffness. Pain is increased with use and if bumping the thumb. She has occasional paresthesias.     She received injections in CMC joint and flexor tendon in July 2023. Reports improvement in thenar pain somewhat. Now she reports pain primarily over dorsal thumb, thumb base.     She has also tried bracing and oral anti-inflammatories without significant relief.       Review of patient's allergies indicates:  No Known Allergies      Current Outpatient Medications   Medication Sig Dispense Refill    albuterol (PROVENTIL/VENTOLIN HFA) 90 mcg/actuation inhaler Inhale 2 puffs into the lungs every 6 (six) hours as needed for Wheezing. Rescue 18 g 3    amLODIPine (NORVASC) 5 MG tablet Take 1 tablet (5 mg total) by mouth once daily. 90 tablet 3    azelastine (ASTELIN) 137 mcg (0.1 %) nasal spray 1 spray (137 mcg total) by Nasal route 2 (two) times daily. 30 mL 3    ergocalciferol (ERGOCALCIFEROL) 50,000 unit Cap Take 1 capsule (50,000 Units total) by mouth every 7 days. 12 capsule 3    estradioL (ESTRACE) 0.01 % (0.1 mg/gram) vaginal cream Place 0.5 g vaginally twice a week. 42.5 g 11    fluticasone propionate (FLONASE) 50 mcg/actuation nasal spray 1 spray (50 mcg total) by Each Nostril route once daily. 16 g 3    fluticasone-salmeterol diskus inhaler 250-50 mcg Inhale 1 puff into the lungs 2 (two) times daily. Controller 14 each 3    ibuprofen (ADVIL,MOTRIN) 600 MG tablet Take 1 tablet (600 mg total) by mouth every 8 (eight) hours as needed for Pain. 30 tablet 1    ketorolac 0.4% (ACULAR) 0.4 % Drop Place into both eyes.      levocetirizine (XYZAL) 5 MG tablet Take 1  "tablet (5 mg total) by mouth every evening. 90 tablet 3    LIDOcaine-prilocaine (EMLA) cream Apply topically 2 (two) times daily as needed. 60 g 5    neomycin-polymyxin-dexamethasone (MAXITROL) 3.5mg/mL-10,000 unit/mL-0.1 % DrpS Place into both eyes.      solifenacin (VESICARE) 10 MG tablet Take 1 tablet (10 mg total) by mouth once daily. 30 tablet 11    tiZANidine (ZANAFLEX) 4 MG tablet TAKE 1 TABLET(4 MG) BY MOUTH EVERY NIGHT AS NEEDED 20 tablet 0     No current facility-administered medications for this visit.     Facility-Administered Medications Ordered in Other Visits   Medication Dose Route Frequency Provider Last Rate Last Admin    EPINEPHrine 1,000 mcg in lactated Ringers 1,000 mL irrigation   Irrigation On Call Procedure Marvin Levine MD        EPINEPHrine 1,000 mcg in lactated Ringers 1,000 mL irrigation   Irrigation On Call Procedure Marvin Levine MD           Past Medical History:   Diagnosis Date    Abnormal Pap smear of cervix     Allergic rhinitis due to allergen     Asthma at age 4       Past Surgical History:   Procedure Laterality Date    HEMORRHOID SURGERY  2009    HYSTERECTOMY  2015    REDUCTION OF BOTH BREASTS Bilateral 06/12/2020    Procedure: MAMMOPLASTY, REDUCTION, BILATERAL;  Surgeon: Marvin Levine MD;  Location: [facility];  Service: Plastics;  Laterality: Bilateral;    TOTAL REDUCTION MAMMOPLASTY         Review of Systems:  Constitutional: Negative for chills and fever.   Respiratory: Negative for cough and shortness of breath.    Gastrointestinal: Negative for nausea and vomiting.   Skin: Negative for rash.   Neurological: Negative for dizziness and headaches.   Psychiatric/Behavioral: Negative for depression.   MSK as in HPI       OBJECTIVE:     PHYSICAL EXAM:  Ht 5' 6" (1.676 m)   Wt 99.7 kg (219 lb 12.8 oz)   LMP 04/27/2015   BMI 35.48 kg/m²     GEN:  NAD, well-developed, well-groomed.  NEURO: Awake, alert, and oriented. Normal attention and concentration.    PSYCH: " Normal mood and affect. Behavior is normal.  HEENT: No cervical lymphadenopathy noted.  CARDIOVASCULAR: Radial pulses 2+ bilaterally. No LE edema noted.  PULMONARY: Breath sounds normal. No respiratory distress.  SKIN: Intact, no rashes.      MSK:   RUE:  Good active ROM of the wrist and fingers. No triggering. Ttp 1st extensor compartment of thumb, thumb base, thenar eminence (most significant over thumb base/1st CMC). Positive Finkelsteins. Positive CMC grind. AIN/PIN/Radial/Median/Ulnar Nerves assessed in isolation without deficit. Radial & Ulnar arteries palpated 2+. Capillary Refill <3s.    RADIOGRAPHS:  Right hand 7/13/23   Impression:     1. No convincing acute displaced fracture or dislocation of the hand.  Comments: I have personally reviewed the imaging and I agree with the above radiologist's report.    ASSESSMENT/PLAN:       ICD-10-CM ICD-9-CM   1. Arthritis of carpometacarpal (CMC) joint of right thumb  M18.11 716.94                  No orders of the defined types were placed in this encounter.       Plan:   Treatment options discussed. She had previously undergone CMC injection and trigger finger injection. Symptoms slightly improved but still not at desired level. Physical exam more consistent with CMC arthritis rather than DeQuervains tenosynovitis. Will proceed with repeat CMC injection today. Discussed possible surgery if patient's symptoms are not improved.    The patient indicates understanding of these issues and agrees to the plan.

## 2023-09-29 ENCOUNTER — HOSPITAL ENCOUNTER (OUTPATIENT)
Dept: RADIOLOGY | Facility: OTHER | Age: 43
Discharge: HOME | End: 2023-09-29
Attending: OBSTETRICS & GYNECOLOGY
Payer: COMMERCIAL

## 2023-09-29 DIAGNOSIS — Z12.31 VISIT FOR SCREENING MAMMOGRAM: ICD-10-CM

## 2023-09-29 PROCEDURE — 77067 SCR MAMMO BI INCL CAD: CPT | Mod: TC

## 2023-09-29 PROCEDURE — 77063 BREAST TOMOSYNTHESIS BI: CPT | Mod: 26,,, | Performed by: RADIOLOGY

## 2023-09-29 PROCEDURE — 77067 MAMMO DIGITAL SCREENING BILAT WITH TOMO: ICD-10-PCS | Mod: 26,,, | Performed by: RADIOLOGY

## 2023-09-29 PROCEDURE — 77067 SCR MAMMO BI INCL CAD: CPT | Mod: 26,,, | Performed by: RADIOLOGY

## 2023-09-29 PROCEDURE — 77063 MAMMO DIGITAL SCREENING BILAT WITH TOMO: ICD-10-PCS | Mod: 26,,, | Performed by: RADIOLOGY

## 2023-10-01 ENCOUNTER — PATIENT MESSAGE (OUTPATIENT)
Dept: OBSTETRICS AND GYNECOLOGY | Facility: CLINIC | Age: 43
End: 2023-10-01
Payer: COMMERCIAL

## 2023-10-16 RX ORDER — DEXAMETHASONE SODIUM PHOSPHATE 4 MG/ML
4 INJECTION, SOLUTION INTRA-ARTICULAR; INTRALESIONAL; INTRAMUSCULAR; INTRAVENOUS; SOFT TISSUE
Status: DISCONTINUED | OUTPATIENT
Start: 2023-09-14 | End: 2023-10-16 | Stop reason: HOSPADM

## 2023-10-16 NOTE — PROGRESS NOTES
I have personally taken the history and examined this patient. I agree with the resident's note as stated above.      Plan:   Treatment options discussed. She had previously undergone CMC injection and trigger finger injection. Symptoms slightly improved but still not at desired level. Physical exam more consistent with CMC arthritis rather than DeQuervains tenosynovitis. Will proceed with repeat CMC injection today. Discussed possible surgery if patient's symptoms are not improved.

## 2023-10-16 NOTE — PROCEDURES
Small Joint Aspiration/Injection: L thumb CMC    Date/Time: 9/14/2023 8:30 AM    Performed by: Josseline Collazo MD  Authorized by: Josseline Collazo MD    Consent Done?:  Yes (Verbal)  Indications:  Pain  Local anesthesia used?: Yes    Anesthesia:  Local infiltration  Local anesthetic:  Lidocaine 1% without epinephrine  Location:  Thumb  Site:  L thumb CMC  Needle size:  25 G  Approach:  Radial  Medications:  4 mg dexAMETHasone 4 mg/mL  Right thumb CMC joint

## 2023-10-29 ENCOUNTER — HOSPITAL ENCOUNTER (EMERGENCY)
Facility: OTHER | Age: 43
Discharge: HOME | End: 2023-10-29
Attending: EMERGENCY MEDICINE
Payer: COMMERCIAL

## 2023-10-29 VITALS
OXYGEN SATURATION: 97 % | HEART RATE: 90 BPM | DIASTOLIC BLOOD PRESSURE: 76 MMHG | WEIGHT: 212 LBS | SYSTOLIC BLOOD PRESSURE: 148 MMHG | BODY MASS INDEX: 34.22 KG/M2 | RESPIRATION RATE: 17 BRPM | TEMPERATURE: 98 F

## 2023-10-29 DIAGNOSIS — S39.012A BACK STRAIN, INITIAL ENCOUNTER: ICD-10-CM

## 2023-10-29 DIAGNOSIS — M54.2 NECK PAIN ON LEFT SIDE: ICD-10-CM
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
## 2023-10-29 DIAGNOSIS — S16.1XXA STRAIN OF NECK MUSCLE, INITIAL ENCOUNTER: Primary | ICD-10-CM

## 2023-10-29 PROCEDURE — 99284 EMERGENCY DEPT VISIT MOD MDM: CPT

## 2023-10-29 RX ORDER — LIDOCAINE 50 MG/G
1 PATCH TOPICAL DAILY
Qty: 10 PATCH | Refills: 0 | Status: SHIPPED | OUTPATIENT
Start: 2023-10-29 | End: 2023-11-08

## 2023-10-29 RX ORDER — ORPHENADRINE CITRATE 100 MG/1
100 TABLET, EXTENDED RELEASE ORAL 2 TIMES DAILY
Qty: 20 TABLET | Refills: 0 | Status: SHIPPED | OUTPATIENT
Start: 2023-10-29 | End: 2023-11-08

## 2023-10-29 RX ORDER — IBUPROFEN 600 MG/1
600 TABLET ORAL EVERY 6 HOURS PRN
Qty: 20 TABLET | Refills: 0 | Status: SHIPPED | OUTPATIENT
Start: 2023-10-29 | End: 2024-01-02 | Stop reason: SDUPTHER

## 2023-10-29 NOTE — Clinical Note
"Cristina Doddra" Donovan was seen and treated in our emergency department on 10/29/2023.  She may return to work on 11/02/2023.       If you have any questions or concerns, please don't hesitate to call.      Aide LONDONO RN    "
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
## 2023-10-29 NOTE — Clinical Note
"Cristina Doddmiguel ángel Man was seen and treated in our emergency department on 10/29/2023.  She may return to work on 11/02/2023.       If you have any questions or concerns, please don't hesitate to call.      Aide Terry RN    "
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
## 2023-11-06 ENCOUNTER — LAB VISIT (OUTPATIENT)
Dept: LAB | Facility: HOSPITAL | Age: 43
End: 2023-11-06
Attending: INTERNAL MEDICINE
Payer: COMMERCIAL

## 2023-11-06 ENCOUNTER — OFFICE VISIT (OUTPATIENT)
Dept: INTERNAL MEDICINE | Facility: CLINIC | Age: 43
End: 2023-11-06
Payer: COMMERCIAL

## 2023-11-06 ENCOUNTER — IMMUNIZATION (OUTPATIENT)
Dept: INTERNAL MEDICINE | Facility: CLINIC | Age: 43
End: 2023-11-06
Payer: COMMERCIAL

## 2023-11-06 VITALS
WEIGHT: 217.13 LBS | SYSTOLIC BLOOD PRESSURE: 138 MMHG | BODY MASS INDEX: 34.9 KG/M2 | HEART RATE: 80 BPM | HEIGHT: 66 IN | OXYGEN SATURATION: 98 % | DIASTOLIC BLOOD PRESSURE: 86 MMHG

## 2023-11-06 DIAGNOSIS — R73.03 PREDIABETES: ICD-10-CM

## 2023-11-06 DIAGNOSIS — E66.01 CLASS 2 SEVERE OBESITY DUE TO EXCESS CALORIES WITH SERIOUS COMORBIDITY AND BODY MASS INDEX (BMI) OF 35.0 TO 35.9 IN ADULT: ICD-10-CM

## 2023-11-06 DIAGNOSIS — J45.20 MILD INTERMITTENT ASTHMA WITH ALLERGIC RHINITIS WITHOUT COMPLICATION: ICD-10-CM

## 2023-11-06 DIAGNOSIS — Z12.11 SCREEN FOR COLON CANCER: ICD-10-CM

## 2023-11-06 DIAGNOSIS — J45.41 MODERATE PERSISTENT ASTHMA WITH EXACERBATION: Primary | ICD-10-CM

## 2023-11-06 DIAGNOSIS — I10 BENIGN ESSENTIAL HYPERTENSION: ICD-10-CM

## 2023-11-06 LAB
ALBUMIN SERPL BCP-MCNC: 3.9 G/DL (ref 3.5–5.2)
ALP SERPL-CCNC: 104 U/L (ref 55–135)
ALT SERPL W/O P-5'-P-CCNC: 13 U/L (ref 10–44)
ANION GAP SERPL CALC-SCNC: 10 MMOL/L (ref 8–16)
AST SERPL-CCNC: 13 U/L (ref 10–40)
BILIRUB SERPL-MCNC: 0.3 MG/DL (ref 0.1–1)
BUN SERPL-MCNC: 12 MG/DL (ref 6–20)
CALCIUM SERPL-MCNC: 9.3 MG/DL (ref 8.7–10.5)
CHLORIDE SERPL-SCNC: 107 MMOL/L (ref 95–110)
CO2 SERPL-SCNC: 23 MMOL/L (ref 23–29)
CREAT SERPL-MCNC: 0.8 MG/DL (ref 0.5–1.4)
EST. GFR  (NO RACE VARIABLE): >60 ML/MIN/1.73 M^2
ESTIMATED AVG GLUCOSE: 123 MG/DL (ref 68–131)
GLUCOSE SERPL-MCNC: 98 MG/DL (ref 70–110)
HBA1C MFR BLD: 5.9 % (ref 4–5.6)
POTASSIUM SERPL-SCNC: 3.8 MMOL/L (ref 3.5–5.1)
PROT SERPL-MCNC: 7.7 G/DL (ref 6–8.4)
SODIUM SERPL-SCNC: 140 MMOL/L (ref 136–145)

## 2023-11-06 PROCEDURE — 90686 IIV4 VACC NO PRSV 0.5 ML IM: CPT | Mod: S$GLB,,, | Performed by: INTERNAL MEDICINE

## 2023-11-06 PROCEDURE — 1160F RVW MEDS BY RX/DR IN RCRD: CPT | Mod: CPTII,S$GLB,, | Performed by: INTERNAL MEDICINE

## 2023-11-06 PROCEDURE — 3079F DIAST BP 80-89 MM HG: CPT | Mod: CPTII,S$GLB,, | Performed by: INTERNAL MEDICINE

## 2023-11-06 PROCEDURE — 3008F BODY MASS INDEX DOCD: CPT | Mod: CPTII,S$GLB,, | Performed by: INTERNAL MEDICINE

## 2023-11-06 PROCEDURE — 99214 OFFICE O/P EST MOD 30 MIN: CPT | Mod: S$GLB,,, | Performed by: INTERNAL MEDICINE

## 2023-11-06 PROCEDURE — 80053 COMPREHEN METABOLIC PANEL: CPT | Performed by: INTERNAL MEDICINE

## 2023-11-06 PROCEDURE — 3075F PR MOST RECENT SYSTOLIC BLOOD PRESS GE 130-139MM HG: ICD-10-PCS | Mod: CPTII,S$GLB,, | Performed by: INTERNAL MEDICINE

## 2023-11-06 PROCEDURE — 1159F MED LIST DOCD IN RCRD: CPT | Mod: CPTII,S$GLB,, | Performed by: INTERNAL MEDICINE

## 2023-11-06 PROCEDURE — 3008F PR BODY MASS INDEX (BMI) DOCUMENTED: ICD-10-PCS | Mod: CPTII,S$GLB,, | Performed by: INTERNAL MEDICINE

## 2023-11-06 PROCEDURE — 90686 FLU VACCINE (QUAD) GREATER THAN OR EQUAL TO 3YO PRESERVATIVE FREE IM: ICD-10-PCS | Mod: S$GLB,,, | Performed by: INTERNAL MEDICINE

## 2023-11-06 PROCEDURE — 99999 PR PBB SHADOW E&M-EST. PATIENT-LVL V: ICD-10-PCS | Mod: PBBFAC,,, | Performed by: INTERNAL MEDICINE

## 2023-11-06 PROCEDURE — 3075F SYST BP GE 130 - 139MM HG: CPT | Mod: CPTII,S$GLB,, | Performed by: INTERNAL MEDICINE

## 2023-11-06 PROCEDURE — 36415 COLL VENOUS BLD VENIPUNCTURE: CPT | Performed by: INTERNAL MEDICINE

## 2023-11-06 PROCEDURE — 1159F PR MEDICATION LIST DOCUMENTED IN MEDICAL RECORD: ICD-10-PCS | Mod: CPTII,S$GLB,, | Performed by: INTERNAL MEDICINE

## 2023-11-06 PROCEDURE — 3044F PR MOST RECENT HEMOGLOBIN A1C LEVEL <7.0%: ICD-10-PCS | Mod: CPTII,S$GLB,, | Performed by: INTERNAL MEDICINE

## 2023-11-06 PROCEDURE — 1160F PR REVIEW ALL MEDS BY PRESCRIBER/CLIN PHARMACIST DOCUMENTED: ICD-10-PCS | Mod: CPTII,S$GLB,, | Performed by: INTERNAL MEDICINE

## 2023-11-06 PROCEDURE — 99214 PR OFFICE/OUTPT VISIT, EST, LEVL IV, 30-39 MIN: ICD-10-PCS | Mod: S$GLB,,, | Performed by: INTERNAL MEDICINE

## 2023-11-06 PROCEDURE — 3044F HG A1C LEVEL LT 7.0%: CPT | Mod: CPTII,S$GLB,, | Performed by: INTERNAL MEDICINE

## 2023-11-06 PROCEDURE — 99999 PR PBB SHADOW E&M-EST. PATIENT-LVL V: CPT | Mod: PBBFAC,,, | Performed by: INTERNAL MEDICINE

## 2023-11-06 PROCEDURE — 83036 HEMOGLOBIN GLYCOSYLATED A1C: CPT | Performed by: INTERNAL MEDICINE

## 2023-11-06 PROCEDURE — G0008 FLU VACCINE (QUAD) GREATER THAN OR EQUAL TO 3YO PRESERVATIVE FREE IM: ICD-10-PCS | Mod: S$GLB,,, | Performed by: INTERNAL MEDICINE

## 2023-11-06 PROCEDURE — 3079F PR MOST RECENT DIASTOLIC BLOOD PRESSURE 80-89 MM HG: ICD-10-PCS | Mod: CPTII,S$GLB,, | Performed by: INTERNAL MEDICINE

## 2023-11-06 PROCEDURE — G0008 ADMIN INFLUENZA VIRUS VAC: HCPCS | Mod: S$GLB,,, | Performed by: INTERNAL MEDICINE

## 2023-11-06 RX ORDER — ALBUTEROL SULFATE 90 UG/1
2 AEROSOL, METERED RESPIRATORY (INHALATION) EVERY 6 HOURS PRN
Qty: 18 G | Refills: 0 | Status: SHIPPED | OUTPATIENT
Start: 2023-11-06 | End: 2024-02-18

## 2023-11-06 RX ORDER — PREDNISONE 20 MG/1
40 TABLET ORAL DAILY
Qty: 10 TABLET | Refills: 0 | Status: SHIPPED | OUTPATIENT
Start: 2023-11-06 | End: 2023-11-11

## 2023-11-06 RX ORDER — IPRATROPIUM BROMIDE AND ALBUTEROL SULFATE 2.5; .5 MG/3ML; MG/3ML
3 SOLUTION RESPIRATORY (INHALATION)
Status: DISCONTINUED | OUTPATIENT
Start: 2023-11-06 | End: 2023-11-06
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
## 2023-11-07 ENCOUNTER — TELEPHONE (OUTPATIENT)
Dept: INTERNAL MEDICINE | Facility: CLINIC | Age: 43
End: 2023-11-07
Payer: COMMERCIAL

## 2023-11-07 PROCEDURE — 94640 PR INHAL RX, AIRWAY OBST/DX SPUTUM INDUCT: ICD-10-PCS | Mod: S$GLB,,, | Performed by: INTERNAL MEDICINE

## 2023-11-07 PROCEDURE — 94640 AIRWAY INHALATION TREATMENT: CPT | Mod: S$GLB,,, | Performed by: INTERNAL MEDICINE

## 2023-11-07 RX ORDER — ALBUTEROL SULFATE 0.83 MG/ML
2.5 SOLUTION RESPIRATORY (INHALATION)
Status: COMPLETED | OUTPATIENT
Start: 2023-11-07 | End: 2023-11-07

## 2023-11-07 RX ADMIN — ALBUTEROL SULFATE 2.5 MG: 0.83 SOLUTION RESPIRATORY (INHALATION) at 04:11

## 2023-11-17 ENCOUNTER — TELEPHONE (OUTPATIENT)
Dept: ENDOSCOPY | Facility: HOSPITAL | Age: 43
End: 2023-11-17

## 2023-11-17 ENCOUNTER — CLINICAL SUPPORT (OUTPATIENT)
Dept: ENDOSCOPY | Facility: HOSPITAL | Age: 43
End: 2023-11-17
Attending: INTERNAL MEDICINE
Payer: COMMERCIAL

## 2023-11-17 DIAGNOSIS — Z12.11 SCREEN FOR COLON CANCER: ICD-10-CM

## 2023-11-17 RX ORDER — POLYETHYLENE GLYCOL 3350, SODIUM SULFATE ANHYDROUS, SODIUM BICARBONATE, SODIUM CHLORIDE, POTASSIUM CHLORIDE 236; 22.74; 6.74; 5.86; 2.97 G/4L; G/4L; G/4L; G/4L; G/4L
4 POWDER, FOR SOLUTION ORAL ONCE
Qty: 4000 ML | Refills: 0 | Status: SHIPPED | OUTPATIENT
Start: 2023-11-17 | End: 2023-11-17

## 2023-11-17 NOTE — PLAN OF CARE
Spoke to pt to schedule procedure(s) Colonoscopy       Physician to perform procedure(s) Dr. HOMAR Braden  Date of Procedure (s) 3/11/24  Arrival Time 1:45 PM  Time of Procedure(s) 2:45 PM   Location of Procedure(s) [address]  Type of Rx Prep sent to patient: PEG  Instructions provided to patient via Email    Patient was informed on the following information and verbalized understanding. Screening questionnaire reviewed with patient and complete. If procedure requires anesthesia, a responsible adult needs to be present to accompany the patient home, patient cannot drive after receiving anesthesia. Appointment details are tentative, especially check-in time. Patient will receive a prep-op call 7 days prior to confirm check-in time for procedure. If applicable the patient should contact their pharmacy to verify Rx for procedure prep is ready for pick-up. Patient was advised to call the scheduling department at [phone] if pharmacy states no Rx is available. Patient was advised to call the endoscopy scheduling department if any questions or concerns arise.      [facility] Endoscopy Scheduling Department        Colonoscopy Procedure Prep Instructions    Date of procedure: 3/11/24Arrive at: 1:45 PM    Location of Department:   [facility] [address] [place] [address]  Take the [facility] Elevators to 4th Floor Endoscopy Lab    As soon as possible:   your prep from pharmacy and over the counter DULCOLAX LAXATIVE TABLETS            On the day before your procedure   What You CAN do:   You may have clear liquids ONLY-see below for list.     Liquids That Are OK to Drink:   Water  Sports drinks (Gatorade, Power-Aid)  Coffee or tea (no cream or nondairy creamer)  Clear juices without pulp (apple, white grape)  Gelatin desserts (no fruit or toppings)  Clear soda (sprite, coke, ginger ale)  Chicken broth (until 12 midnight the night before procedure)    What You CANNOT do:   Do not EAT solid  food, drink milk or anything   colored red.  Do not drink alcohol.  Do not take oral medications within 1 hour of starting   each dose of prep.  No gum chewing or candy morning of procedure                       Note:   (Please disregard the insert instructions from pharmacy).  PEG Bowel Prep is indicated for cleansing of the colon as a preparation for colonoscopy in adults.   Be sure to tell your doctor about all the medicines you take, including prescription and non-prescription medicines, vitamins, and herbal supplements. PEG Bowel Prep may affect how other medicines work.  Medication taken by mouth may not be absorbed properly when taken within 1 hour before the start of each dose of PEG Bowel Prep.    It is not uncommon to experience some abdominal cramping, nausea and/or vomiting when taking the prep. If you have nausea and/or vomiting while taking the prep, stop drinking for 20 to 30 minutes then continue.      How to take prep:    PEG Bowel Prep is a (2-day) prep.    One (1) bottle of prep are required for a complete preparation for colonoscopy. Dilute the solution concentrate as directed prior to use. You must drink water with each dose of prep, and additional water after each dose.    DOSE 1--Day Before Colonoscopy 3/10/24     Drink at least 6 to 8 glasses of clear liquids from time you wake up until you begin your prep and then continue until bedtime to avoid dehydration.     12:00 pm (NOON) Mix your entire container of prep with lukewarm water and refrigerate. Take four (4) Dulcolax (Bisacodyl) tablets with at least 8 ounces or more of clear liquids.       6:00 pm:    You must complete Steps 1 and 2 below before going to bed:    Step 1-Drink half the liquid in the container within one (1) hour.   Step 2-Refrigerate the remaining half of the liquid for dose 2. See below when to begin this step.                       IMPORTANT: If you experience preparation-related symptoms (for example, nausea, bloating,  or cramping), stop, or slow the rate of drinking the additional water until your symptoms decrease.    DOSE 2--Day of the Colonoscopy 3/11/24 at 7-8 AM.    For this dose, repeat Step 1 shown above using the remaining half of the liquid prep.   You may continue drinking water/clear liquids until   4 hours before your colonoscopy or as directed by the scheduling nurse  10:45 AM.    For more information about your procedure, please watch this informational video. It is important to watch this animated consent video prior to your arrival.   If you haven't watched the video prior to arriving, you are required to watch it during admission which can cause delays.     Options for viewing:  Using a keyboard:  press and hold the control tab (Ctrl) and left mouse click to follow link          Colonoscopy Instructional Video                                                        OR    Type link address into your web browser's address bar:  [email or web address]/[email or web address]    Using a mobile phone: tap on web address/link.     Comments:          IMPORTANT INFORMATION TO KNOW BEFORE YOUR PROCEDURE    [facility] [place] 4th Floor    If your procedure requires the administration of anesthesia, it is necessary for a responsible adult to drive you home. (Medical Transportation, Uber, Lyft, Taxi, etc. may ONLY be used if a responsible adult is present to accompany you home.  The responsible adult CAN'T be the  of the service).      person must be available to return to pick you up within 30 minutes of being notified of discharge.     Due to the limited socially distant seating in our waiting room, please limit your guest (1) who accompany you for this procedure. If someone accompanies you for this procedure into the facility:    Consider having them proceed to an area that is socially distant other than the lobby until a member of the medical team contacts them to provide an update after the  procedure.     Also, please consider being dropped off and picked up from the facility.      Please bring a picture ID, insurance card, & copayment    Take Medications as directed below:    If you begin taking any blood thinning medications or injectable weight loss/diabetes medications (other than insulin) , please contact the endoscopy scheduling department listed below as soon as possible    If you are diabetic see the attached instruction sheet regarding your medication.     If you take HEART, BLOOD PRESSURE, SEIZURE, PAIN, LUNG (including inhalers/nebulizers), ANTI-REJECTION (transplant patients), or PSYCHIATRIC medications, please take at your regular times with a sip of water or as directed by the scheduling nurse.     Important contact information:    Endoscopy [phone] Hours of operation Monday-Friday 8:00-4:30pm.    Questions about insurance or financial obligations call [phone] or [phone].    If you have questions regarding the prep or need to reschedule, please call [phone]. After hours questions requiring immediate assistance, contact [facility] nurse line at [phone] or [phone].   NOTE:     On occasion, unforeseen circumstances may cause a delay in your procedure start time. We respect your time and appreciate your patience during these circumstances.      Comments:

## 2023-11-21 ENCOUNTER — TELEPHONE (OUTPATIENT)
Dept: INTERNAL MEDICINE | Facility: CLINIC | Age: 43
End: 2023-11-21
Payer: COMMERCIAL

## 2023-11-21 DIAGNOSIS — J45.41 MODERATE PERSISTENT ASTHMA WITH ACUTE EXACERBATION: Primary | ICD-10-CM

## 2023-11-21 RX ORDER — PREDNISONE 20 MG/1
40 TABLET ORAL DAILY
Qty: 10 TABLET | Refills: 0 | Status: SHIPPED | OUTPATIENT
Start: 2023-11-21 | End: 2023-11-26

## 2023-11-21 RX ORDER — FLUTICASONE PROPIONATE AND SALMETEROL 500; 50 UG/1; UG/1
1 POWDER RESPIRATORY (INHALATION) 2 TIMES DAILY
Qty: 60 EACH | Refills: 11 | Status: SHIPPED | OUTPATIENT
Start: 2023-11-21 | End: 2024-11-20

## 2023-11-21 RX ORDER — ALBUTEROL SULFATE 0.63 MG/3ML
0.63 SOLUTION RESPIRATORY (INHALATION) EVERY 6 HOURS PRN
Qty: 75 ML | Refills: 0 | Status: SHIPPED | OUTPATIENT
Start: 2023-11-21 | End: 2024-01-17

## 2023-11-21 NOTE — TELEPHONE ENCOUNTER
----- Message from Nelly Murdock sent at 11/21/2023  9:04 AM CST -----  Contact: [phone]@[email or web address]  Good morning patient would like a call back to see what her next steps should be. Patient is still having problem with her asthma after taking med doc gave her. Patient says while on the med they helped but now she is done the problem is back. Please give patient a call back [phone]

## 2023-12-18 ENCOUNTER — TELEPHONE (OUTPATIENT)
Dept: PULMONOLOGY | Facility: CLINIC | Age: 43
End: 2023-12-18
Payer: COMMERCIAL

## 2023-12-18 DIAGNOSIS — J45.909 ASTHMA, UNSPECIFIED ASTHMA SEVERITY, UNSPECIFIED WHETHER COMPLICATED, UNSPECIFIED WHETHER PERSISTENT: Primary | ICD-10-CM

## 2023-12-30 ENCOUNTER — OFFICE VISIT (OUTPATIENT)
Dept: URGENT CARE | Facility: CLINIC | Age: 43
End: 2023-12-30
Payer: COMMERCIAL

## 2023-12-30 VITALS
HEART RATE: 91 BPM | TEMPERATURE: 98 F | OXYGEN SATURATION: 97 % | RESPIRATION RATE: 19 BRPM | DIASTOLIC BLOOD PRESSURE: 83 MMHG | BODY MASS INDEX: 34.87 KG/M2 | HEIGHT: 66 IN | WEIGHT: 217 LBS | SYSTOLIC BLOOD PRESSURE: 139 MMHG

## 2023-12-30 DIAGNOSIS — R05.9 COUGH, UNSPECIFIED TYPE: ICD-10-CM

## 2023-12-30 DIAGNOSIS — J10.1 INFLUENZA B: Primary | ICD-10-CM

## 2023-12-30 LAB
CTP QC/QA: YES
CTP QC/QA: YES
POC MOLECULAR INFLUENZA A AGN: NEGATIVE
POC MOLECULAR INFLUENZA B AGN: POSITIVE
SARS-COV-2 AG RESP QL IA.RAPID: NEGATIVE

## 2023-12-30 PROCEDURE — 99204 PR OFFICE/OUTPT VISIT, NEW, LEVL IV, 45-59 MIN: ICD-10-PCS | Mod: S$GLB,,, | Performed by: NURSE PRACTITIONER

## 2023-12-30 PROCEDURE — 87502 POCT INFLUENZA A/B MOLECULAR: ICD-10-PCS | Mod: QW,S$GLB,, | Performed by: NURSE PRACTITIONER

## 2023-12-30 PROCEDURE — 87811 SARS-COV-2 COVID19 W/OPTIC: CPT | Mod: QW,S$GLB,, | Performed by: NURSE PRACTITIONER

## 2023-12-30 PROCEDURE — 87811 SARS CORONAVIRUS 2 ANTIGEN POCT, MANUAL READ: ICD-10-PCS | Mod: QW,S$GLB,, | Performed by: NURSE PRACTITIONER

## 2023-12-30 PROCEDURE — 99204 OFFICE O/P NEW MOD 45 MIN: CPT | Mod: S$GLB,,, | Performed by: NURSE PRACTITIONER

## 2023-12-30 PROCEDURE — 87502 INFLUENZA DNA AMP PROBE: CPT | Mod: QW,S$GLB,, | Performed by: NURSE PRACTITIONER

## 2023-12-30 RX ORDER — PROMETHAZINE HYDROCHLORIDE AND DEXTROMETHORPHAN HYDROBROMIDE 6.25; 15 MG/5ML; MG/5ML
5 SYRUP ORAL EVERY 8 HOURS PRN
Qty: 180 ML | Refills: 0 | Status: SHIPPED | OUTPATIENT
Start: 2023-12-30

## 2023-12-30 RX ORDER — OSELTAMIVIR PHOSPHATE 75 MG/1
75 CAPSULE ORAL 2 TIMES DAILY
Qty: 10 CAPSULE | Refills: 0 | Status: SHIPPED | OUTPATIENT
Start: 2023-12-30 | End: 2024-01-04

## 2023-12-30 NOTE — PATIENT INSTRUCTIONS
- You must understand that you have received an Urgent Care treatment only and that you may be released before all of your medical problems are known or treated.   - You, the patient, will arrange for follow up care as instructed.   - If your condition worsens or fails to improve we recommend that you receive another evaluation at the ER immediately or contact your PCP to discuss your concerns.   - You can call [phone] or [phone] to help schedule an appointment with the appropriate provider.    Drink plenty of fluids   Get lots of rest  Tylenol or ibuprofen for pain/fever  Mucinex DM for daytime cough  Prescription cough syrup for night time cough. This medication will make you drowsy. Do not drink alcohol or  Operate machinery while taking this medicine.   Saline nasal rinses to irrigate sinus cavities  Warm salt water gargles for sore throat

## 2023-12-30 NOTE — LETTER
December 30, 2023      Urgent Care [address] [address]  Phone: [phone]  Fax: [phone]       Patient: Cristina Man   YOB: 1980  Date of Visit: 12/30/2023    To Whom It May Concern:    Audrey Man  was at [facility] on 12/30/2023. The patient may return to work/school on 01/03/2024 with no restrictions. If you have any questions or concerns, or if I can be of further assistance, please do not hesitate to contact me.    Sincerely,    Nannette Nichols NP

## 2023-12-30 NOTE — PROGRESS NOTES
"Subjective:      Patient ID: Cristina Man is a 43 y.o. female.    Vitals:  height is 5' 6" (1.676 m) and weight is 98.4 kg (217 lb). Her oral temperature is 98.3 °F (36.8 °C). Her blood pressure is 139/83 and her pulse is 91. Her respiration is 19 and oxygen saturation is 97%.     Chief Complaint: Sinus Problem    Pt is a 44 yo female w/ c/o states her symptoms started about 3 days ago. Pt states she have a cough, sore throat and bodyaches. Pt states her chest hurts from the coughing spells. Pt states she has some greenish color phlegm. Pt states she has taken Tanya Copeland Plus for her symptoms with mild relief. Her son is also sick    Sinus Problem  This is a new problem. The current episode started in the past 7 days. The problem is unchanged. There has been no fever. Her pain is at a severity of 7/10. The pain is mild. Associated symptoms include congestion, coughing, headaches, neck pain, sinus pressure, sneezing, a sore throat and swollen glands. Pertinent negatives include no chills, diaphoresis, ear pain, hoarse voice or shortness of breath. Treatments tried: Tanya Copeland Plus. The treatment provided mild relief.       Constitution: Negative for chills and sweating.   HENT:  Positive for congestion, sinus pressure and sore throat. Negative for ear pain.    Neck: Positive for neck pain.   Respiratory:  Positive for cough. Negative for shortness of breath.    Allergic/Immunologic: Positive for sneezing.   Neurological:  Positive for headaches.      Objective:     Physical Exam   Constitutional: She is oriented to person, place, and time. She appears well-developed. She is cooperative.  Non-toxic appearance. She does not appear ill. No distress.   HENT:   Head: Normocephalic and atraumatic.   Ears:   Right Ear: Hearing and external ear normal.   Left Ear: Hearing and external ear normal.   Nose: Mucosal edema present. No rhinorrhea or nasal deformity. No epistaxis. Right sinus exhibits maxillary sinus " tenderness and frontal sinus tenderness. Left sinus exhibits maxillary sinus tenderness and frontal sinus tenderness.   Mouth/Throat: Uvula is midline, oropharynx is clear and moist and mucous membranes are normal. No trismus in the jaw. Normal dentition. No uvula swelling. No oropharyngeal exudate, posterior oropharyngeal edema or posterior oropharyngeal erythema.   Eyes: Conjunctivae and lids are normal. No scleral icterus.   Neck: Trachea normal and phonation normal. Neck supple. No edema present. No erythema present. No neck rigidity present.   Cardiovascular: Normal rate, regular rhythm, normal heart sounds and normal pulses.   Pulmonary/Chest: Effort normal and breath sounds normal. No respiratory distress. She has no decreased breath sounds. She has no wheezes. She has no rhonchi.   Abdominal: Normal appearance.   Musculoskeletal: Normal range of motion.         General: No deformity. Normal range of motion.   Neurological: She is alert and oriented to person, place, and time. She exhibits normal muscle tone. Coordination normal.   Skin: Skin is warm, dry, intact, not diaphoretic and not pale.   Psychiatric: Her speech is normal and behavior is normal. Judgment and thought content normal.   Nursing note and vitals reviewed.    Results for orders placed or performed in visit on 12/30/23   SARS Coronavirus 2 Antigen, POCT Manual Read   Result Value Ref Range    SARS Coronavirus 2 Antigen Negative Negative     Acceptable Yes    POCT Influenza A/B MOLECULAR   Result Value Ref Range    POC Molecular Influenza A Ag Negative Negative, Not Reported    POC Molecular Influenza B Ag Positive (A) Negative, Not Reported     Acceptable Yes      Assessment:     1. Influenza B    2. Cough, unspecified type        Plan:       Influenza B  -     oseltamivir (TAMIFLU) 75 MG capsule; Take 1 capsule (75 mg total) by mouth 2 (two) times daily. for 5 days  Dispense: 10 capsule; Refill: 0    Cough,  unspecified type  -     SARS Coronavirus 2 Antigen, POCT Manual Read  -     POCT Influenza A/B MOLECULAR  -     promethazine-dextromethorphan (PROMETHAZINE-DM) 6.25-15 mg/5 mL Syrp; Take 5 mLs by mouth every 8 (eight) hours as needed (cough).  Dispense: 180 mL; Refill: 0      Patient Instructions   - You must understand that you have received an Urgent Care treatment only and that you may be released before all of your medical problems are known or treated.   - You, the patient, will arrange for follow up care as instructed.   - If your condition worsens or fails to improve we recommend that you receive another evaluation at the ER immediately or contact your PCP to discuss your concerns.   - You can call [phone] or [phone] to help schedule an appointment with the appropriate provider.    Drink plenty of fluids   Get lots of rest  Tylenol or ibuprofen for pain/fever  Mucinex DM for daytime cough  Prescription cough syrup for night time cough. This medication will make you drowsy. Do not drink alcohol or  Operate machinery while taking this medicine.   Saline nasal rinses to irrigate sinus cavities  Warm salt water gargles for sore throat

## 2023-12-31 DIAGNOSIS — M79.641 RIGHT HAND PAIN: ICD-10-CM

## 2023-12-31 DIAGNOSIS — M79.644 THUMB PAIN, RIGHT: ICD-10-CM

## 2024-01-02 RX ORDER — IBUPROFEN 600 MG/1
600 TABLET ORAL EVERY 8 HOURS PRN
Qty: 30 TABLET | Refills: 0 | Status: SHIPPED | OUTPATIENT
Start: 2024-01-02

## 2024-01-02 NOTE — TELEPHONE ENCOUNTER
Refill Routing Note   Medication(s) are not appropriate for processing by [facility] for the following reason(s):        Outside of protocol    [facility] action(s):  Route               Appointments  past 12m or future 3m with PCP    Date Provider   Last Visit   11/6/2023 Madelaine Hayden MD   Next Visit   Visit date not found Madelaine Hayden MD   ED visits in past 90 days: 1        Note composed:9:52 AM 01/02/2024

## 2024-01-15 DIAGNOSIS — J45.41 MODERATE PERSISTENT ASTHMA WITH ACUTE EXACERBATION: ICD-10-CM

## 2024-01-15 NOTE — TELEPHONE ENCOUNTER
No care due was identified.  [facility] Embedded Care Due Messages. Reference number: [id number].   1/15/2024 4:34:32 AM CST

## 2024-01-17 RX ORDER — ALBUTEROL SULFATE 0.63 MG/3ML
SOLUTION RESPIRATORY (INHALATION)
Qty: 225 ML | Refills: 3 | Status: SHIPPED | OUTPATIENT
Start: 2024-01-17

## 2024-01-17 NOTE — TELEPHONE ENCOUNTER
Refill Routing Note   Medication(s) are not appropriate for processing by [facility] for the following reason(s):        New or recently adjusted medication    [facility] action(s):  Defer               Appointments  past 12m or future 3m with PCP    Date Provider   Last Visit   11/6/2023 Madelaine Hayden MD   Next Visit   Visit date not found Madelaine Hayden MD   ED visits in past 90 days: 1        Note composed:11:10 PM 01/16/2024

## 2024-02-16 DIAGNOSIS — J45.20 MILD INTERMITTENT ASTHMA WITH ALLERGIC RHINITIS WITHOUT COMPLICATION: ICD-10-CM

## 2024-02-16 NOTE — TELEPHONE ENCOUNTER
----- Message from Reema Mejia sent at 2/16/2024  3:45 PM CST -----  Contact: Pt  [phone]  Requesting an RX refill or new RX.  Is this a refill or new RX: refill  RX name and strength (copy/paste from chart):  albuterol (PROAIR HFA) 90 mcg/actuation inhale  Is this a 30 day or 90 day RX:   Pharmacy name and phone # (copy/paste from chart):  [id number] - [place] [address] AT [facility] [address]   Phone: [phone]  Fax: [phone]        The doctors have asked that we provide their patients with the following 2 reminders -- prescription refills can take up to 72 hours, and a friendly reminder that in the future you can use your [facility] account to request refills: yes    Pt states she needs this urgent states she went to pharmacy and the wrong script was sent in

## 2024-02-16 NOTE — TELEPHONE ENCOUNTER
Care Due:                  Date            Visit Type   Department     Provider  --------------------------------------------------------------------------------                                EP -                              PRIMARY      [facility]  Last Visit: 11-      CARE ([facility])   MEDICINE       Aleyda Hayden  Next Visit: None Scheduled  None         None Found                                                            Last  Test          Frequency    Reason                     Performed    Due Date  --------------------------------------------------------------------------------    CBC.........  12 months..  ibuprofen................  05- 05-    Vitamin D...  12 months..  ergocalciferol...........  05- 05-    [facility] Embedded Care Due Messages. Reference number: [id number].   2/16/2024 3:37:36 PM CST

## 2024-02-16 NOTE — TELEPHONE ENCOUNTER
Refill Routing Note   Medication(s) are not appropriate for processing by [facility] for the following reason(s):        New or recently adjusted medication    [facility] action(s):  Defer     Requires labs : Yes             Appointments  past 12m or future 3m with PCP    Date Provider   Last Visit   11/6/2023 Madelaine Hayden MD   Next Visit   Visit date not found Madelaine Hayden MD   ED visits in past 90 days: 0        Note composed:4:15 PM 02/16/2024

## 2024-02-18 RX ORDER — ALBUTEROL SULFATE 90 UG/1
2 AEROSOL, METERED RESPIRATORY (INHALATION) EVERY 6 HOURS PRN
Qty: 54 G | Refills: 2 | Status: SHIPPED | OUTPATIENT
Start: 2024-02-18

## 2024-03-11 ENCOUNTER — ANESTHESIA EVENT (OUTPATIENT)
Dept: ENDOSCOPY | Facility: HOSPITAL | Age: 44
End: 2024-03-11
Payer: COMMERCIAL

## 2024-03-11 ENCOUNTER — HOSPITAL ENCOUNTER (OUTPATIENT)
Facility: HOSPITAL | Age: 44
Discharge: HOME | End: 2024-03-11
Attending: COLON & RECTAL SURGERY | Admitting: COLON & RECTAL SURGERY
Payer: COMMERCIAL

## 2024-03-11 ENCOUNTER — ANESTHESIA (OUTPATIENT)
Dept: ENDOSCOPY | Facility: HOSPITAL | Age: 44
End: 2024-03-11
Payer: COMMERCIAL

## 2024-03-11 VITALS
RESPIRATION RATE: 14 BRPM | OXYGEN SATURATION: 100 % | HEART RATE: 71 BPM | TEMPERATURE: 98 F | WEIGHT: 216 LBS | SYSTOLIC BLOOD PRESSURE: 154 MMHG | HEIGHT: 66 IN | BODY MASS INDEX: 34.72 KG/M2 | DIASTOLIC BLOOD PRESSURE: 78 MMHG

## 2024-03-11 DIAGNOSIS — Z12.11 SCREENING FOR MALIGNANT NEOPLASM OF COLON: Primary | ICD-10-CM

## 2024-03-11 PROCEDURE — 63600175 PHARM REV CODE 636 W HCPCS: Performed by: NURSE ANESTHETIST, CERTIFIED REGISTERED

## 2024-03-11 PROCEDURE — 45385 COLONOSCOPY W/LESION REMOVAL: CPT | Mod: PT | Performed by: COLON & RECTAL SURGERY

## 2024-03-11 PROCEDURE — 37000008 HC ANESTHESIA 1ST 15 MINUTES: Performed by: COLON & RECTAL SURGERY

## 2024-03-11 PROCEDURE — 25000003 PHARM REV CODE 250: Performed by: COLON & RECTAL SURGERY

## 2024-03-11 PROCEDURE — 27201012 HC FORCEPS, HOT/COLD, DISP: Performed by: COLON & RECTAL SURGERY

## 2024-03-11 PROCEDURE — 45380 COLONOSCOPY AND BIOPSY: CPT | Mod: PT,59 | Performed by: COLON & RECTAL SURGERY

## 2024-03-11 PROCEDURE — 37000009 HC ANESTHESIA EA ADD 15 MINS: Performed by: COLON & RECTAL SURGERY

## 2024-03-11 PROCEDURE — 88305 TISSUE EXAM BY PATHOLOGIST: CPT | Performed by: STUDENT IN AN ORGANIZED HEALTH CARE EDUCATION/TRAINING PROGRAM

## 2024-03-11 PROCEDURE — 45380 COLONOSCOPY AND BIOPSY: CPT | Mod: 59,PT,, | Performed by: COLON & RECTAL SURGERY

## 2024-03-11 PROCEDURE — 88305 TISSUE EXAM BY PATHOLOGIST: CPT | Mod: 26,,, | Performed by: STUDENT IN AN ORGANIZED HEALTH CARE EDUCATION/TRAINING PROGRAM

## 2024-03-11 PROCEDURE — 27201089 HC SNARE, DISP (ANY): Performed by: COLON & RECTAL SURGERY

## 2024-03-11 PROCEDURE — 25000003 PHARM REV CODE 250: Performed by: NURSE ANESTHETIST, CERTIFIED REGISTERED

## 2024-03-11 PROCEDURE — E9220 PRA ENDO ANESTHESIA: HCPCS | Mod: 33,,, | Performed by: NURSE ANESTHETIST, CERTIFIED REGISTERED

## 2024-03-11 PROCEDURE — 45385 COLONOSCOPY W/LESION REMOVAL: CPT | Mod: PT,,, | Performed by: COLON & RECTAL SURGERY

## 2024-03-11 RX ORDER — LIDOCAINE HYDROCHLORIDE 20 MG/ML
INJECTION INTRAVENOUS
Status: DISCONTINUED | OUTPATIENT
Start: 2024-03-11 | End: 2024-03-11

## 2024-03-11 RX ORDER — PROPOFOL 10 MG/ML
VIAL (ML) INTRAVENOUS
Status: DISCONTINUED | OUTPATIENT
Start: 2024-03-11 | End: 2024-03-11

## 2024-03-11 RX ORDER — PROPOFOL 10 MG/ML
VIAL (ML) INTRAVENOUS CONTINUOUS PRN
Status: DISCONTINUED | OUTPATIENT
Start: 2024-03-11 | End: 2024-03-11

## 2024-03-11 RX ORDER — SODIUM CHLORIDE 9 MG/ML
INJECTION, SOLUTION INTRAVENOUS CONTINUOUS
Status: DISCONTINUED | OUTPATIENT
Start: 2024-03-11 | End: 2024-03-11 | Stop reason: HOSPADM

## 2024-03-11 RX ADMIN — SODIUM CHLORIDE: 0.9 INJECTION, SOLUTION INTRAVENOUS at 12:03

## 2024-03-11 RX ADMIN — Medication 70 MG: at 12:03

## 2024-03-11 RX ADMIN — LIDOCAINE HYDROCHLORIDE 50 MG: 20 INJECTION INTRAVENOUS at 12:03

## 2024-03-11 RX ADMIN — PROPOFOL 150 MCG/KG/MIN: 10 INJECTION, EMULSION INTRAVENOUS at 12:03

## 2024-03-11 RX ADMIN — Medication 20 MG: at 12:03

## 2024-03-11 NOTE — PLAN OF CARE
Reviewed discharge instructions, verbalizes understanding. Steady gait ambulated. SO in lobby waiting for patient. NAD noted. Removed PIV cannula intact, dressing applied.

## 2024-03-11 NOTE — H&P
"COLONOSCOPY HISTORY AND PHYSICAL EXAM    Procedure : Colonoscopy      INDICATIONS: asymptomatic screening exam    Family Hx of CRC: None    Last Colonoscopy:  None  Findings: n/a       Past Medical History:   Diagnosis Date    Abnormal Pap smear of cervix     Allergic rhinitis due to allergen     Asthma at age 4     Sedation Problems: NO  Family History   Problem Relation Age of Onset    Hypertension Mother     Cirrhosis Father         alcoholic    Hypertension Brother     Heart failure Maternal Grandmother     Hypertension Maternal Grandmother     Diabetes Maternal Grandmother     Hypertension Maternal Grandfather         DM    Diabetes Maternal Grandfather     Lung cancer Paternal Grandmother     Hypertension Paternal Grandmother     Diabetes Paternal Grandmother     Breast cancer Neg Hx     Colon cancer Neg Hx     Ovarian cancer Neg Hx      Fam Hx of Sedation Problems: NO  Social History     Socioeconomic History    Marital status:     Number of children: 2   Occupational History    Occupation:       Employer: [employer]   Tobacco Use    Smoking status: Never    Smokeless tobacco: Never   Substance and Sexual Activity    Alcohol use: Yes     Comment: occ    Drug use: No    Sexual activity: Yes     Partners: Male     Birth control/protection: None       Review of Systems - Negative except   Respiratory ROS: no dyspnea  Cardiovascular ROS: no exertional chest pain  Gastrointestinal ROS: NO abdominal discomfort,  NO rectal bleeding  Musculoskeletal ROS: no muscular pain  Neurological ROS: no recent stroke    Physical Exam:  /79   Pulse 86   Temp 98.8 °F (37.1 °C)   Resp 15   Ht 5' 6" (1.676 m)   Wt 98 kg (216 lb)   LMP 04/27/2015   SpO2 98%   Breastfeeding No   BMI 34.86 kg/m²   General: no distress  Head: normocephalic  Mallampati Score   Neck: supple, symmetrical, trachea midline  Lungs:  clear to auscultation bilaterally and normal respiratory " effort  Heart: regular rate and rhythm and no murmur  Abdomen: soft, non-tender non-distented; bowel sounds normal; no masses,  no organomegaly  Extremities: no cyanosis or edema, or clubbing    ASA:  II    PLAN  COLONOSCOPY.    SedationPlan :MAC    The details of the procedure, the possible need for biopsy or polypectomy and the potential risks including bleeding, perforation, missed polyps were discussed in detail.

## 2024-03-11 NOTE — ANESTHESIA RELEASE NOTE
"Anesthesia Release from PACU Note    Patient: Cristina Man    Procedure(s) Performed: Procedure(s) (LRB):  COLONOSCOPY (N/A)    Anesthesia type: general    Post pain: Adequate analgesia    Post assessment: no apparent anesthetic complications and tolerated procedure well    Last Vitals: Visit Vitals  BP (!) 154/78 (BP Location: Left arm, Patient Position: Lying)   Pulse 71   Temp 36.7 °C (98.1 °F) (Tympanic)   Resp 14   Ht 5' 6" (1.676 m)   Wt 98 kg (216 lb)   LMP 04/27/2015   SpO2 100%   Breastfeeding No   BMI 34.86 kg/m²       Post vital signs: stable    Level of consciousness: awake and alert     Nausea/Vomiting: no nausea/no vomiting    Complications: none    Airway Patency: patent    Respiratory: unassisted, spontaneous ventilation, room air    Cardiovascular: stable and blood pressure at baseline    Hydration: euvolemic  "

## 2024-03-11 NOTE — TRANSFER OF CARE
"Anesthesia Transfer of Care Note    Patient: Cristina Man    Procedure(s) Performed: Procedure(s) (LRB):  COLONOSCOPY (N/A)    Patient location: GI    Anesthesia Type: general    Transport from OR: Transported from OR on room air with adequate spontaneous ventilation    Post pain: adequate analgesia    Post assessment: no apparent anesthetic complications and tolerated procedure well    Post vital signs: stable    Level of consciousness: awake, alert and oriented    Nausea/Vomiting: no nausea/vomiting    Complications: none    Transfer of care protocol was followed    Last vitals: Visit Vitals  /79   Pulse 109   Temp 37.1 °C (98.8 °F)   Resp 16   Ht 5' 6" (1.676 m)   Wt 98 kg (216 lb)   LMP 04/27/2015   SpO2 96%   Breastfeeding No   BMI 34.86 kg/m²     "

## 2024-03-11 NOTE — PROVATION PATIENT INSTRUCTIONS
Discharge Summary/Instructions after an Endoscopic Procedure  Patient Name: Cristina Man  Patient MRN: [id number]  Patient YOB: 1980 Monday, March 11, 2024  Emely Braden MD  Dear patient,  As a result of recent federal legislation (The Federal Cures Act), you may   receive lab or pathology results from your procedure in your [facility]   account before your physician is able to contact you. Your physician or   their representative will relay the results to you with their   recommendations at their soonest availability.  Thank you,  RESTRICTIONS:  During your procedure today, you received medications for sedation.  These   medications may affect your judgment, balance and coordination.  Therefore,   for 24 hours, you have the following restrictions:   - DO NOT drive a car, operate machinery, make legal/financial decisions,   sign important papers or drink alcohol.    ACTIVITY:  Today: no heavy lifting, straining or running due to procedural   sedation/anesthesia.  The following day: return to full activity including work.  DIET:  Eat and drink normally unless instructed otherwise.     TREATMENT FOR COMMON SIDE EFFECTS:  - Mild abdominal pain, nausea, belching, bloating or excessive gas:  rest,   eat lightly and use a heating pad.  - Sore Throat: treat with throat lozenges and/or gargle with warm salt   water.  - Because air was used during the procedure, expelling large amounts of air   from your rectum or belching is normal.  - If a bowel prep was taken, you may not have a bowel movement for 1-3 days.    This is normal.  SYMPTOMS TO WATCH FOR AND REPORT TO YOUR PHYSICIAN:  1. Abdominal pain or bloating, other than gas cramps.  2. Chest pain.  3. Back pain.  4. Signs of infection such as: chills or fever occurring within 24 hours   after the procedure.  5. Rectal bleeding, which would show as bright red, maroon, or black stools.   (A tablespoon of blood from the rectum is not serious, especially if    hemorrhoids are present.)  6. Vomiting.  7. Weakness or dizziness.  GO DIRECTLY TO THE NEAREST EMERGENCY ROOM IF YOU HAVE ANY OF THE FOLLOWING:      Difficulty breathing              Chills and/or fever over 101 F   Persistent vomiting and/or vomiting blood   Severe abdominal pain   Severe chest pain   Black, tarry stools   Bleeding- more than one tablespoon   Any other symptom or condition that you feel may need urgent attention  Your doctor recommends these additional instructions:  If any biopsies were taken, your doctors clinic will contact you in 1 to 2   weeks with any results.  - Discharge patient to home.   - Resume previous diet.   - Continue present medications.   - Await pathology results.   - Repeat colonoscopy date to be determined after pending pathology results   are reviewed for surveillance.   - Written discharge instructions were provided to the patient.   - The signs and symptoms of potential delayed complications were discussed   with the patient.   - Patient has a contact number available for emergencies.   - Return to normal activities tomorrow.  For questions, problems or results please call your physician - Emely Braden MD at Work:  [phone].  [facility], EMERGENCY ROOM PHONE NUMBER: [phone]  IF A COMPLICATION OR EMERGENCY SITUATION ARISES AND YOU ARE UNABLE TO REACH   YOUR PHYSICIAN - GO DIRECTLY TO THE EMERGENCY ROOM.  Emely Braden MD  3/11/2024 1:24:45 PM  This report has been verified and signed electronically.  Dear patient,  As a result of recent federal legislation (The Federal Cures Act), you may   receive lab or pathology results from your procedure in your [facility]   account before your physician is able to contact you. Your physician or   their representative will relay the results to you with their   recommendations at their soonest availability.  Thank you,  PROVATION

## 2024-03-11 NOTE — ANESTHESIA POSTPROCEDURE EVALUATION
Anesthesia Post Evaluation    Patient: Cristina Man    Procedure(s) Performed: Procedure(s) (LRB):  COLONOSCOPY (N/A)    Final Anesthesia Type: general      Patient location during evaluation: GI PACU  Patient participation: Yes- Able to Participate  Level of consciousness: awake and alert  Post-procedure vital signs: reviewed and stable  Pain management: adequate  Airway patency: patent    PONV status at discharge: No PONV  Anesthetic complications: no      Cardiovascular status: hemodynamically stable  Respiratory status: unassisted, spontaneous ventilation and room air  Hydration status: euvolemic  Follow-up not needed.          Vitals Value Taken Time   /78 03/11/24 1400   Temp 36.7 °C (98.1 °F) 03/11/24 1326   Pulse 71 03/11/24 1400   Resp 14 03/11/24 1400   SpO2 100 % 03/11/24 1400         Event Time   Out of Recovery 14:32:00         Pain/Gianluca Score: Gianluca Score: 10 (3/11/2024  1:45 PM)

## 2024-03-11 NOTE — ANESTHESIA PREPROCEDURE EVALUATION
03/11/2024  Cristina Man is a 44 y.o., female.    Active Problem List with Overview Notes    Diagnosis Date Noted    Arthritis of carpometacarpal (CMC) joint of right thumb 09/14/2023    Dyspareunia, female 10/02/2022    Atrophic vaginitis 10/02/2022    Mixed urge and stress incontinence 10/02/2022    Urinary frequency 10/02/2022    Macromastia 06/12/2020    Mild intermittent asthma with allergic rhinitis without complication 12/10/2019    Benign essential hypertension 12/10/2019    Prediabetes 12/10/2019    Bilateral impacted cerumen 06/10/2019    Migraine without aura and without status migrainosus, not intractable 06/10/2019    Class 2 severe obesity due to excess calories with serious comorbidity and body mass index (BMI) of 35.0 to 35.9 in adult 06/10/2019     Past Surgical History:   Procedure Laterality Date    HEMORRHOID SURGERY  2009    HYSTERECTOMY  2015    REDUCTION OF BOTH BREASTS Bilateral 06/12/2020    Procedure: MAMMOPLASTY, REDUCTION, BILATERAL;  Surgeon: Marvin Levine MD;  Location: [facility];  Service: Plastics;  Laterality: Bilateral;    TOTAL REDUCTION MAMMOPLASTY       Results for orders placed or performed during the hospital encounter of 07/16/22   EKG 12-lead    Collection Time: 07/16/22  9:35 PM    Narrative    Test Reason :     Vent. Rate : 068 BPM     Atrial Rate : 068 BPM     P-R Int : 154 ms          QRS Dur : 094 ms      QT Int : 380 ms       P-R-T Axes : 042 011 026 degrees     QTc Int : 404 ms    Normal sinus rhythm  Normal ECG  When compared with ECG of 26-MAY-2021 13:21,  No significant change was found  Confirmed by Max Ho MD ([id number]) on 7/17/2022 3:22:28 PM    Referred By: [email or web address]   SELF           Confirmed By:Max Ho MD       Pre-op Assessment    I have reviewed the Patient Summary Reports.    I have reviewed the NPO Status.   I have  reviewed the Medications.     Review of Systems  Anesthesia Hx:  No problems with previous Anesthesia                Social:  Non-Smoker       Hematology/Oncology:  Hematology Normal   Oncology Normal                                   EENT/Dental:  EENT/Dental Normal           Cardiovascular:     Hypertension                                        Pulmonary:    Asthma                    Renal/:  Renal/ Normal                 Hepatic/GI:  Hepatic/GI Normal                 Musculoskeletal:  Musculoskeletal Normal                Neurological:      Headaches                                 Endocrine:  Endocrine Normal          Obesity / BMI > 30  Dermatological:  Skin Normal    Psych:  Psychiatric Normal                  Physical Exam  General: Well nourished, Cooperative, Alert and Oriented    Airway:  Mallampati: II   Mouth Opening: Normal  TM Distance: Normal  Tongue: Normal  Neck ROM: Normal ROM    Dental:  Intact    Chest/Lungs:  Normal Respiratory Rate    Anesthesia Plan  Type of Anesthesia, risks & benefits discussed:    Anesthesia Type: Gen Natural Airway  Intra-op Monitoring Plan: Standard ASA Monitors  Post Op Pain Control Plan: multimodal analgesia  Induction:  IV  Informed Consent: Informed consent signed with the Patient and all parties understand the risks and agree with anesthesia plan.  All questions answered.   ASA Score: 2  Day of Surgery Review of History & Physical: H&P Update referred to the surgeon/provider.    Ready For Surgery From Anesthesia Perspective.   .

## 2024-03-13 LAB
FINAL PATHOLOGIC DIAGNOSIS: NORMAL
GROSS: NORMAL
Lab: NORMAL

## 2024-05-07 ENCOUNTER — PATIENT MESSAGE (OUTPATIENT)
Dept: INTERNAL MEDICINE | Facility: CLINIC | Age: 44
End: 2024-05-07
Payer: COMMERCIAL

## 2024-06-10 ENCOUNTER — PATIENT MESSAGE (OUTPATIENT)
Dept: INTERNAL MEDICINE | Facility: CLINIC | Age: 44
End: 2024-06-10
Payer: COMMERCIAL

## 2024-09-24 NOTE — PROGRESS NOTES
[facility] - UROGYNECOLOGY  [address] [address]  2024     Cristina Man  [id number]  1980    UROGYN FOLLOW-UP  2024     44 y.o. female,   presents for urogyn follow up. She c/o   Chief Complaint   Patient presents with    Follow-up    Urinary Frequency    .     HPI from 2022:  42 Y O F  has a past medical history of Abnormal Pap smear of cervix, Allergic rhinitis due to allergen, and Asthma (at age 4). Refereed for urinary frequency     2022:  Myrbetriq was too expensive. Tolteradine did not help. Trying vesicare 10 mg daily. 3 days ago started having intense pain with urination, urinary hesitation and dribbling. Patient dropped off urine specimen yesterday and UA is indicative of infection.     Changes from last visit:  Last seen . 1)  UI:  (+) CELIA   (--) UUI.  (--) pads.  Daytime frequency: Q 30 mins -1 hours.  Nocturia: Yes: 4/night.   (+) dysuria,  (--) hematuria,  (--) frequent UTIs.  (+) incomplete bladder emptying. She does not sit on public toilets. She did not go to pelvic floor PT. She has been off of her vesicare for years. She only took it for about 1 month and did not notice much of a difference.     2)  POP:  Absent. (--) vaginal bleeding s/p hyst. (+) vaginal discharge. (+) sexually active with her .  (--) dyspareunia. (--)  Vaginal dryness.  (--) vaginal estrogen use.     3)  BM:  (+) constipation/straining.  (--) chronic diarrhea. (--) hematochezia.  (--) fecal incontinence.  (--) fecal smearing/urgency.  (--) incomplete evacuation.     Past Medical History:   Diagnosis Date    Abnormal Pap smear of cervix     Allergic rhinitis due to allergen     Asthma at age 4       Past Surgical History:   Procedure Laterality Date    COLONOSCOPY N/A 3/11/2024    Procedure: COLONOSCOPY;  Surgeon: Emely Braden MD;  Location: [address];  Service: Endoscopy;  Laterality: N/A;  inst to email, peg, ref by Le  Hayden-MS  3/6-precall complete-MS    HEMORRHOID SURGERY  2009    HYSTERECTOMY  2015    REDUCTION OF BOTH BREASTS Bilateral 06/12/2020    Procedure: MAMMOPLASTY, REDUCTION, BILATERAL;  Surgeon: Marvin Levine MD;  Location: [facility];  Service: Plastics;  Laterality: Bilateral;    TOTAL REDUCTION MAMMOPLASTY         Family History   Problem Relation Name Age of Onset    Hypertension Mother      Cirrhosis Father          alcoholic    Hypertension Brother      Heart failure Maternal Grandmother      Hypertension Maternal Grandmother      Diabetes Maternal Grandmother      Hypertension Maternal Grandfather          DM    Diabetes Maternal Grandfather      Lung cancer Paternal Grandmother      Hypertension Paternal Grandmother      Diabetes Paternal Grandmother      Breast cancer Neg Hx      Colon cancer Neg Hx      Ovarian cancer Neg Hx         Social History     Socioeconomic History    Marital status:     Number of children: 2   Occupational History    Occupation:       Employer: [employer]   Tobacco Use    Smoking status: Never    Smokeless tobacco: Never   Substance and Sexual Activity    Alcohol use: Yes     Comment: occ    Drug use: No    Sexual activity: Yes     Partners: Male     Birth control/protection: None       Current Outpatient Medications   Medication Sig Dispense Refill    albuterol (ACCUNEB) 0.63 mg/3 mL Nebu USE 3 ML IN NEBULIZER EVERY 6 HOURS AS NEEDED FOR WHEEZING 225 mL 3    albuterol (PROVENTIL/VENTOLIN HFA) 90 mcg/actuation inhaler INHALE 2 PUFFS BY MOUTH INTO THE LUNGS EVERY 6 HOURS AS NEEDED FOR WHEEZING. RESCUE 54 g 2    ergocalciferol (ERGOCALCIFEROL) 50,000 unit Cap Take 1 capsule (50,000 Units total) by mouth every 7 days. 12 capsule 3    fluticasone propionate (FLONASE) 50 mcg/actuation nasal spray 1 spray (50 mcg total) by Each Nostril route once daily. 16 g 3    fluticasone-salmeterol diskus inhaler 500-50 mcg Inhale 1 puff into the  lungs 2 (two) times daily. Controller 60 each 11    ibuprofen (ADVIL,MOTRIN) 600 MG tablet Take 1 tablet (600 mg total) by mouth every 8 (eight) hours as needed for Pain. 30 tablet 0    ketorolac 0.4% (ACULAR) 0.4 % Drop Place into both eyes.      LIDOcaine-prilocaine (EMLA) cream Apply topically 2 (two) times daily as needed. 60 g 5    nebulizer and compressor Gely Use as directed 1 each 0    neomycin-polymyxin-dexamethasone (MAXITROL) 3.5mg/mL-10,000 unit/mL-0.1 % DrpS Place into both eyes.      promethazine-dextromethorphan (PROMETHAZINE-DM) 6.25-15 mg/5 mL Syrp Take 5 mLs by mouth every 8 (eight) hours as needed (cough). 180 mL 0    tiZANidine (ZANAFLEX) 4 MG tablet TAKE 1 TABLET(4 MG) BY MOUTH EVERY NIGHT AS NEEDED 20 tablet 0    amLODIPine (NORVASC) 5 MG tablet Take 1 tablet (5 mg total) by mouth once daily. 90 tablet 3    azelastine (ASTELIN) 137 mcg (0.1 %) nasal spray 1 spray (137 mcg total) by Nasal route 2 (two) times daily. 30 mL 3    estradioL (ESTRACE) 0.01 % (0.1 mg/gram) vaginal cream Place 0.5 g vaginally twice a week. (Patient not taking: Reported on 2024) 42.5 g 11    levocetirizine (XYZAL) 5 MG tablet Take 1 tablet (5 mg total) by mouth every evening. 90 tablet 3    solifenacin (VESICARE) 10 MG tablet Take 1 tablet (10 mg total) by mouth once daily. 30 tablet 11     No current facility-administered medications for this visit.     Facility-Administered Medications Ordered in Other Visits   Medication Dose Route Frequency Provider Last Rate Last Admin    EPINEPHrine 1,000 mcg in lactated Ringers 1,000 mL irrigation   Irrigation On Call Procedure Marvin Levine MD        EPINEPHrine 1,000 mcg in lactated Ringers 1,000 mL irrigation   Irrigation On Call Procedure Marvin Levine MD           Review of patient's allergies indicates:  No Known Allergies    OB History          2    Para   2    Term   2            AB        Living             SAB        IAB        Ectopic      "   Multiple        Live Births                      Well Woman  Patient's last menstrual period was 04/27/2015.   Pap: 2022 NILM, HPV neg  Mammo: 9/2023 normal  Colonoscopy: 2024: Adenomatous polyps.  Recommend repeat colonoscopy in 3 years (2027).   Dexa: n/a    ROS  As per HPI.      Physical Exam  /73 (BP Location: Right arm, Patient Position: Sitting, BP Method: Large (Automatic))   Pulse 80   Ht 5' 6" (1.676 m)   Wt 101.8 kg (224 lb 6.9 oz)   LMP 04/27/2015   BMI 36.22 kg/m²   General: alert and oriented, no acute distress  Respiratory: normal respiratory effort    Pelvic:  Deferred. Patient's child is present and she is scheduled for a well woman appointment on 10/08/24 with Dr. Calle.     Impression  1. OAB (overactive bladder)  vibegron (GEMTESA) 75 mg Tab    Ambulatory referral/consult to Physical/Occupational Therapy    Urine culture      2. Dysuria  phenazopyridine (PYRIDIUM) 200 MG tablet    Urine culture      3. Mixed urge and stress incontinence  estradioL (ESTRACE) 0.01 % (0.1 mg/gram) vaginal cream      4. Urinary frequency  estradioL (ESTRACE) 0.01 % (0.1 mg/gram) vaginal cream      5. UTI symptoms  estradioL (ESTRACE) 0.01 % (0.1 mg/gram) vaginal cream      6. Atrophic vaginitis  estradioL (ESTRACE) 0.01 % (0.1 mg/gram) vaginal cream      7. Dyspareunia, female  estradioL (ESTRACE) 0.01 % (0.1 mg/gram) vaginal cream      8. Stress incontinence  Ambulatory referral/consult to Physical/Occupational Therapy      9. Constipation, unspecified constipation type  Ambulatory referral/consult to Physical/Occupational Therapy        We reviewed the above issues and discussed options for short-term versus long-term management of her problems.     Plan:   1. Mixed urinary incontinence, urge < stress:   -- Bladder diary for 3-7 days, write the number of times you go to the rest room and associated symptoms of urgency or leakage.   -- Empty bladder every 2-3 hours.  Empty well: wait a minute, lean " forward on toilet.    -- Avoid dietary irritants (see sheet).  Keep diary x 3-5 days to determine your irritants.  -- KEGELS: do 10 in AM and 10 in PM, holding each x 10 seconds.  When you feel urge to go, STOP, KEGEL, and when urge has passed, then go to bathroom.  Start pelvic floor PT (see below to call to start PT).  -- URGENCY: Failed Vesicare 10 mg. Start Gemtesa 75 mg if affordable. COUPON: Text GEMTESA to 351442.  Takes 2-4 weeks to see if will have effect.  For dry mouth: get sour, sugar free lozenge or gum.  If affordable, we can send you a free 30-day supply through [employer].   -- For pain with urination: Take pyridium up to 3 times daily for max of 3 days at a time  --STRESS (leakage with laugh/cough/sneeze):    Non surgical options: PT vs. Pessary vs. Impressa vs. Rivive - which represent urethral and bladder support devices  Surgical options: mid urethral sling with mesh vs. Periurethral bulking injections (Bulkamid)     2. Nocturia (nighttime urination):   --stop fluids 2-3 hours before bed.    --limit water at night: don't keep by bed--keep in bathroom or nearby or just keep ice chips at bedside  --for dry mouth: get sugar free lozenge or Biotene mouthwash to help stimulate salivation  --hydrate well during daytime with water  -- will consider nighttime anti-cholinergic if needed  --If have leg swelling:  Elevate feet above chest x 1 hour before bed to get excess fluid off.  Can also use support hose (knee highs).       3. Vaginal atrophy (dryness):  Use 0.5-1 gram (dime size) of estrogen cream in vagina two nights a week.  Can use finger or applicator. Can buy disposable vaginal applicators on amazon if desired.   --Vaginal estrogen will help to decrease urinary symptoms (urgency/frequency/UTIs) around menopause. https://www.yourpelvicfloor.org/media/Low-Dose_Vaginal_Estrogen_Therapy.pdf    4. Constipation:  -- Controlling constipation may help bladder urgency/leakage and fiber may better control  "cholesterol and blood glucose.   -- Start daily fiber. Start taking 1 tsp of fiber powder (psyllium or other sugar-free powder, ex. Benefiber or Metamucil) or fiber gummies or fiber pills.  Mix in 8 oz of water.  Take x 3-5 days.  Then, increase fiber by 1 tsp every 3-5 days until stool is easy to pass.  Stop and continue at that dose.   **Do not exceed 6 tsps/day.   -- May also use over the counter stool softener (ex Colace) 1-2 x/day.  **AVOID laxatives.  -- Can also try "Natural Vitality Calm" powder or magnesium oxide 250-400 mg per night (helps with sleep, anxiety, and constipation)  -- Drink plenty of water!  -- Get a SQUATTY POTTY to help with incomplete bowel emptying     Please call to schedule appointment with pelvic floor PT:  [facility]:  1)  [facility]: (p) [phone]. (f) [phone]. Established patients call:  [phone].  2)  [facility]/[place]: (p) [phone]  . (f) [phone].    3)  [facility]: (p) [phone].  Or [phone].   4)  [facility]. (p) [phone].       NON-[facility]:  [facility]: [address], [place] [address]).  (p)  [phone].  (f) [phone]       [facility].  [address] [phone]  [place] [address].  (p) [phone].  (f) [phone].     Elissa Rehman ([facility] physical therapy:  [address]):  (p) [phone]. (f) [phone].      [facility]. (p) [phone]  (f) [phone]. --Takes Medicaid.      [facility]: [address], [place]): (p) [phone].  (f) [phone].     RTC in 3 months    30 minutes were spent in face to face time with this patient  100 % of this time was spent in counseling and/or coordination of care    Max Ron PA-C  Division of Female Pelvic Medicine and Reconstructive Surgery  Department of Obstetrics & Gynecology  [facility] [place]      "

## 2024-09-25 ENCOUNTER — OFFICE VISIT (OUTPATIENT)
Dept: UROGYNECOLOGY | Facility: CLINIC | Age: 44
End: 2024-09-25
Payer: COMMERCIAL

## 2024-09-25 VITALS
HEART RATE: 80 BPM | WEIGHT: 224.44 LBS | DIASTOLIC BLOOD PRESSURE: 73 MMHG | BODY MASS INDEX: 36.07 KG/M2 | SYSTOLIC BLOOD PRESSURE: 124 MMHG | HEIGHT: 66 IN

## 2024-09-25 DIAGNOSIS — R39.9 UTI SYMPTOMS: ICD-10-CM

## 2024-09-25 DIAGNOSIS — R35.0 URINARY FREQUENCY: ICD-10-CM

## 2024-09-25 DIAGNOSIS — R30.0 DYSURIA: ICD-10-CM

## 2024-09-25 DIAGNOSIS — N94.10 DYSPAREUNIA, FEMALE: ICD-10-CM

## 2024-09-25 DIAGNOSIS — N95.2 ATROPHIC VAGINITIS: ICD-10-CM

## 2024-09-25 DIAGNOSIS — N32.81 OAB (OVERACTIVE BLADDER): Primary | ICD-10-CM

## 2024-09-25 DIAGNOSIS — K59.00 CONSTIPATION, UNSPECIFIED CONSTIPATION TYPE: ICD-10-CM

## 2024-09-25 DIAGNOSIS — N39.3 STRESS INCONTINENCE: ICD-10-CM

## 2024-09-25 DIAGNOSIS — N39.46 MIXED URGE AND STRESS INCONTINENCE: ICD-10-CM

## 2024-09-25 PROCEDURE — 99213 OFFICE O/P EST LOW 20 MIN: CPT | Mod: S$GLB,,, | Performed by: PHYSICIAN ASSISTANT

## 2024-09-25 PROCEDURE — 99999 PR PBB SHADOW E&M-EST. PATIENT-LVL V: CPT | Mod: PBBFAC,,, | Performed by: PHYSICIAN ASSISTANT

## 2024-09-25 PROCEDURE — 3078F DIAST BP <80 MM HG: CPT | Mod: CPTII,S$GLB,, | Performed by: PHYSICIAN ASSISTANT

## 2024-09-25 PROCEDURE — 1159F MED LIST DOCD IN RCRD: CPT | Mod: CPTII,S$GLB,, | Performed by: PHYSICIAN ASSISTANT

## 2024-09-25 PROCEDURE — 87086 URINE CULTURE/COLONY COUNT: CPT | Performed by: PHYSICIAN ASSISTANT

## 2024-09-25 PROCEDURE — 3074F SYST BP LT 130 MM HG: CPT | Mod: CPTII,S$GLB,, | Performed by: PHYSICIAN ASSISTANT

## 2024-09-25 PROCEDURE — 3008F BODY MASS INDEX DOCD: CPT | Mod: CPTII,S$GLB,, | Performed by: PHYSICIAN ASSISTANT

## 2024-09-25 RX ORDER — ESTRADIOL 0.1 MG/G
0.5 CREAM VAGINAL
Qty: 42.5 G | Refills: 11 | Status: SHIPPED | OUTPATIENT
Start: 2024-09-26

## 2024-09-25 RX ORDER — PHENAZOPYRIDINE HYDROCHLORIDE 200 MG/1
200 TABLET, FILM COATED ORAL 3 TIMES DAILY PRN
Qty: 10 TABLET | Refills: 1 | Status: SHIPPED | OUTPATIENT
Start: 2024-09-25

## 2024-09-25 RX ORDER — VIBEGRON 75 MG/1
75 TABLET, FILM COATED ORAL DAILY
Qty: 30 TABLET | Refills: 11 | Status: SHIPPED | OUTPATIENT
Start: 2024-09-25

## 2024-09-25 NOTE — PATIENT INSTRUCTIONS
1. Mixed urinary incontinence, urge < stress:   -- Bladder diary for 3-7 days, write the number of times you go to the rest room and associated symptoms of urgency or leakage.   -- Empty bladder every 2-3 hours.  Empty well: wait a minute, lean forward on toilet.    -- Avoid dietary irritants (see sheet).  Keep diary x 3-5 days to determine your irritants.  -- KEGELS: do 10 in AM and 10 in PM, holding each x 10 seconds.  When you feel urge to go, STOP, KEGEL, and when urge has passed, then go to bathroom.  Start pelvic floor PT (see below to call to start PT).  -- URGENCY: Failed Vesicare 10 mg. Start Gemtesa 75 mg if affordable. COUPON: Text GEMTESA to [id number].  Takes 2-4 weeks to see if will have effect.  For dry mouth: get sour, sugar free lozenge or gum.  If affordable, we can send you a free 30-day supply through [employer].   -- For pain with urination: Take pyridium up to 3 times daily for max of 3 days at a time  --STRESS (leakage with laugh/cough/sneeze):    Non surgical options: PT vs. Pessary vs. Impressa vs. Rivive - which represent urethral and bladder support devices  Surgical options: mid urethral sling with mesh vs. Periurethral bulking injections (Bulkamid)     2. Nocturia (nighttime urination):   --stop fluids 2-3 hours before bed.    --limit water at night: don't keep by bed--keep in bathroom or nearby or just keep ice chips at bedside  --for dry mouth: get sugar free lozenge or Biotene mouthwash to help stimulate salivation  --hydrate well during daytime with water  -- will consider nighttime anti-cholinergic if needed  --If have leg swelling:  Elevate feet above chest x 1 hour before bed to get excess fluid off.  Can also use support hose (knee highs).       3. Vaginal atrophy (dryness):  Use 0.5-1 gram (dime size) of estrogen cream in vagina two nights a week.  Can use finger or applicator. Can buy disposable vaginal applicators on amazon if desired.   --Vaginal estrogen will help to decrease  "urinary symptoms (urgency/frequency/UTIs) around menopause. https://www.yourpelvicfloor.org/media/Low-Dose_Vaginal_Estrogen_Therapy.pdf    4. Constipation:  -- Controlling constipation may help bladder urgency/leakage and fiber may better control cholesterol and blood glucose.   -- Start daily fiber. Start taking 1 tsp of fiber powder (psyllium or other sugar-free powder, ex. Benefiber or Metamucil) or fiber gummies or fiber pills.  Mix in 8 oz of water.  Take x 3-5 days.  Then, increase fiber by 1 tsp every 3-5 days until stool is easy to pass.  Stop and continue at that dose.   **Do not exceed 6 tsps/day.   -- May also use over the counter stool softener (ex Colace) 1-2 x/day.  **AVOID laxatives.  -- Can also try "Natural Vitality Calm" powder or magnesium oxide 250-400 mg per night (helps with sleep, anxiety, and constipation)  -- Drink plenty of water!  -- Get a SQUATTY POTTY to help with incomplete bowel emptying     Please call to schedule appointment with pelvic floor PT:  [facility]:  1)  [facility]: (p) [phone]. (f) [phone]. Established patients call:  [phone].  2)  [facility]/[place]: (p) [phone]  . (f) [phone].    3)  [facility]: (p) [phone].  Or [phone].   4)  [facility]. (p) [phone].       NON-[facility]:  [facility]: [address], [place], [place] [address]).  (p)  [phone].  (f) [phone]       [facility].  [address] [phone]  [place] [address].  (p) [phone].  (f) [phone].     Elissa Rehman ([facility] physical therapy:  [address]):  (p) [phone]. (f) [phone].      [facility]. (p) [phone]  (f) [phone]. --Takes Medicaid.      [facility]: [address] [place] [address]): (p) [phone].  (f) [phone].     RTC in 3 months  "

## 2024-09-27 LAB
BACTERIA UR CULT: NORMAL
BACTERIA UR CULT: NORMAL

## 2024-09-30 ENCOUNTER — PATIENT MESSAGE (OUTPATIENT)
Dept: UROGYNECOLOGY | Facility: CLINIC | Age: 44
End: 2024-09-30
Payer: COMMERCIAL

## 2024-10-05 ENCOUNTER — HOSPITAL ENCOUNTER (EMERGENCY)
Facility: HOSPITAL | Age: 44
Discharge: HOME | End: 2024-10-06
Attending: STUDENT IN AN ORGANIZED HEALTH CARE EDUCATION/TRAINING PROGRAM
Payer: COMMERCIAL

## 2024-10-05 DIAGNOSIS — R51.9 INTRACTABLE HEADACHE, UNSPECIFIED CHRONICITY PATTERN, UNSPECIFIED HEADACHE TYPE: Primary | ICD-10-CM

## 2024-10-05 LAB
HCV AB SERPL QL IA: NORMAL
HIV 1+2 AB+HIV1 P24 AG SERPL QL IA: NORMAL
SARS-COV-2 RDRP RESP QL NAA+PROBE: NEGATIVE

## 2024-10-05 PROCEDURE — 63600175 PHARM REV CODE 636 W HCPCS: Performed by: PHYSICIAN ASSISTANT

## 2024-10-05 PROCEDURE — U0002 COVID-19 LAB TEST NON-CDC: HCPCS | Performed by: PHYSICIAN ASSISTANT

## 2024-10-05 PROCEDURE — 63600175 PHARM REV CODE 636 W HCPCS

## 2024-10-05 PROCEDURE — 99285 EMERGENCY DEPT VISIT HI MDM: CPT | Mod: 25

## 2024-10-05 PROCEDURE — 96365 THER/PROPH/DIAG IV INF INIT: CPT

## 2024-10-05 PROCEDURE — 86803 HEPATITIS C AB TEST: CPT | Performed by: PHYSICIAN ASSISTANT

## 2024-10-05 PROCEDURE — 25000003 PHARM REV CODE 250

## 2024-10-05 PROCEDURE — 96375 TX/PRO/DX INJ NEW DRUG ADDON: CPT

## 2024-10-05 PROCEDURE — 87389 HIV-1 AG W/HIV-1&-2 AB AG IA: CPT | Performed by: PHYSICIAN ASSISTANT

## 2024-10-05 PROCEDURE — 96366 THER/PROPH/DIAG IV INF ADDON: CPT

## 2024-10-05 RX ORDER — MAGNESIUM SULFATE HEPTAHYDRATE 40 MG/ML
2 INJECTION, SOLUTION INTRAVENOUS ONCE
Status: COMPLETED | OUTPATIENT
Start: 2024-10-05 | End: 2024-10-05

## 2024-10-05 RX ORDER — BUTALBITAL, ACETAMINOPHEN AND CAFFEINE 50; 325; 40 MG/1; MG/1; MG/1
1 TABLET ORAL
Status: COMPLETED | OUTPATIENT
Start: 2024-10-05 | End: 2024-10-05

## 2024-10-05 RX ORDER — METOCLOPRAMIDE HYDROCHLORIDE 5 MG/ML
10 INJECTION INTRAMUSCULAR; INTRAVENOUS
Status: COMPLETED | OUTPATIENT
Start: 2024-10-05 | End: 2024-10-05

## 2024-10-05 RX ORDER — DEXAMETHASONE SODIUM PHOSPHATE 4 MG/ML
8 INJECTION, SOLUTION INTRA-ARTICULAR; INTRALESIONAL; INTRAMUSCULAR; INTRAVENOUS; SOFT TISSUE
Status: COMPLETED | OUTPATIENT
Start: 2024-10-05 | End: 2024-10-05

## 2024-10-05 RX ORDER — KETOROLAC TROMETHAMINE 30 MG/ML
10 INJECTION, SOLUTION INTRAMUSCULAR; INTRAVENOUS
Status: COMPLETED | OUTPATIENT
Start: 2024-10-05 | End: 2024-10-05

## 2024-10-05 RX ADMIN — METOCLOPRAMIDE 10 MG: 5 INJECTION, SOLUTION INTRAMUSCULAR; INTRAVENOUS at 09:10

## 2024-10-05 RX ADMIN — DEXAMETHASONE SODIUM PHOSPHATE 8 MG: 4 INJECTION INTRA-ARTICULAR; INTRALESIONAL; INTRAMUSCULAR; INTRAVENOUS; SOFT TISSUE at 09:10

## 2024-10-05 RX ADMIN — MAGNESIUM SULFATE HEPTAHYDRATE 2 G: 40 INJECTION, SOLUTION INTRAVENOUS at 09:10

## 2024-10-05 RX ADMIN — BUTALBITAL, ACETAMINOPHEN, AND CAFFEINE 1 TABLET: 325; 50; 40 TABLET ORAL at 11:10

## 2024-10-05 RX ADMIN — SODIUM CHLORIDE, POTASSIUM CHLORIDE, SODIUM LACTATE AND CALCIUM CHLORIDE 1000 ML: 600; 310; 30; 20 INJECTION, SOLUTION INTRAVENOUS at 09:10

## 2024-10-05 RX ADMIN — KETOROLAC TROMETHAMINE 10 MG: 30 INJECTION, SOLUTION INTRAMUSCULAR; INTRAVENOUS at 11:10

## 2024-10-06 VITALS
WEIGHT: 224 LBS | HEIGHT: 66 IN | HEART RATE: 78 BPM | BODY MASS INDEX: 36 KG/M2 | RESPIRATION RATE: 16 BRPM | TEMPERATURE: 98 F | OXYGEN SATURATION: 96 % | SYSTOLIC BLOOD PRESSURE: 145 MMHG | DIASTOLIC BLOOD PRESSURE: 90 MMHG

## 2024-10-06 RX ORDER — BUTALBITAL, ACETAMINOPHEN AND CAFFEINE 50; 325; 40 MG/1; MG/1; MG/1
1 TABLET ORAL EVERY 4 HOURS PRN
Qty: 9 TABLET | Refills: 0 | Status: SHIPPED | OUTPATIENT
Start: 2024-10-06 | End: 2024-11-05

## 2024-10-06 RX ORDER — ONDANSETRON 4 MG/1
4 TABLET, ORALLY DISINTEGRATING ORAL EVERY 6 HOURS PRN
Qty: 12 TABLET | Refills: 0 | Status: SHIPPED | OUTPATIENT
Start: 2024-10-06

## 2024-10-06 NOTE — DISCHARGE INSTRUCTIONS
I have prescribed you headache medicine and nausea medicine.    I would like you to follow up with Neurology due to continued migraines.  They can prescribe you additional medications to either prevent or stop your headaches     If the headaches worsen, you noticed double vision or experienced fevers you should return for re-evaluation

## 2024-10-06 NOTE — ED TRIAGE NOTES
Pt arrives to ED for headache since Thursday. Pt reports the headache is all over her head and is 8/10 for pain. Denies hitting head. Pt does have photophobia. Pt took tylenol and ibuprofen at home with no relief. Pt states that the pain is constant

## 2024-10-06 NOTE — ED PROVIDER NOTES
Encounter Date: 10/5/2024       History     Chief Complaint   Patient presents with    Headache     X 3 days with eye pressure. +nausea and nasal congestion      44-year-old female with a history of migraines, hypertension presents to the ED with a chief complaint of headache.  Patient reports a constant headache for the past 3 days.  She states that it initially began as pressure behind her right eye but has progressively worsened to become an headache on the right side that is also now on the left side.  She describes it as throbbing.  She reports associated nasal congestion, but states that the headache began before the congestion.  She states that the pain sometimes goes to the back of her head and down her neck.  She does feel somewhat achy.  Denies fever.  She states that this does not feel like her typical migraine and it is unusual for her to have constant headaches unrelieved with OTC medications.  She has attempted treatment with Tylenol, ibuprofen, Gerdy's, Claritin without relief.       Review of patient's allergies indicates:  No Known Allergies  Past Medical History:   Diagnosis Date    Abnormal Pap smear of cervix     Allergic rhinitis due to allergen     Asthma at age 4    Hypertension     Migraines      Past Surgical History:   Procedure Laterality Date    COLONOSCOPY N/A 3/11/2024    Procedure: COLONOSCOPY;  Surgeon: Emely Braden MD;  Location: [facility] ([address] [place] [address]);  Service: Endoscopy;  Laterality: N/A;  inst to email, peg, ref by Madelaine Hayden-MS  3/6-precall complete-MS    HEMORRHOID SURGERY  2009    HYSTERECTOMY  2015    REDUCTION OF BOTH BREASTS Bilateral 06/12/2020    Procedure: MAMMOPLASTY, REDUCTION, BILATERAL;  Surgeon: Marvin Levine MD;  Location: [facility];  Service: Plastics;  Laterality: Bilateral;    TOTAL REDUCTION MAMMOPLASTY       Family History   Problem Relation Name Age of Onset    Hypertension Mother      Cirrhosis Father          alcoholic    Hypertension Brother       Heart failure Maternal Grandmother      Hypertension Maternal Grandmother      Diabetes Maternal Grandmother      Hypertension Maternal Grandfather          DM    Diabetes Maternal Grandfather      Lung cancer Paternal Grandmother      Hypertension Paternal Grandmother      Diabetes Paternal Grandmother      Breast cancer Neg Hx      Colon cancer Neg Hx      Ovarian cancer Neg Hx       Social History     Tobacco Use    Smoking status: Never    Smokeless tobacco: Never   Substance Use Topics    Alcohol use: Yes     Comment: occ    Drug use: No     Review of Systems    Physical Exam     Initial Vitals [10/05/24 1915]   BP Pulse Resp Temp SpO2   (!) 197/96 81 20 97.5 °F (36.4 °C) 100 %      MAP       --         Physical Exam    Nursing note and vitals reviewed.  Constitutional: She appears well-developed and well-nourished. She is not diaphoretic.  Non-toxic appearance. She does not appear ill. No distress.   HENT:   Head: Normocephalic and atraumatic.   Eyes: Conjunctivae and EOM are normal. Pupils are equal, round, and reactive to light. No scleral icterus.   Neck: Neck supple.   Cardiovascular:  Normal rate and regular rhythm.     Exam reveals no gallop and no friction rub.       No murmur heard.  Pulmonary/Chest: Effort normal and breath sounds normal. No accessory muscle usage. No tachypnea. No respiratory distress. She has no decreased breath sounds. She has no wheezes. She has no rhonchi. She has no rales.   Abdominal: She exhibits no distension.   Musculoskeletal:      Cervical back: Neck supple.     Neurological: She is alert. She has normal strength. No sensory deficit. Coordination and gait normal.   No dysarthria.  No facial asymmetry.  Normal finger-to-nose.   Skin: No rash noted.   Psychiatric: She has a normal mood and affect. Her behavior is normal.         ED Course   Procedures  Labs Reviewed   HIV 1 / 2 ANTIBODY       Result Value    HIV 1/2 Ag/Ab Non-reactive      Narrative:     Release to  patient->Immediate   HEPATITIS C ANTIBODY    Hepatitis C Ab Non-reactive      Narrative:     Release to patient->Immediate   SARS-COV-2 RNA AMPLIFICATION, QUAL    SARS-CoV-2 RNA, Amplification, Qual Negative            Imaging Results              CT Head Without Contrast (In process)  Result time 10/05/24 21:27:28                     Medications   magnesium sulfate 2g in water 50mL IVPB (premix) (0 g Intravenous Stopped 10/5/24 2305)   lactated ringers bolus 1,000 mL (0 mLs Intravenous Stopped 10/5/24 2205)   metoclopramide injection 10 mg (10 mg Intravenous Given 10/5/24 2104)   dexAMETHasone injection 8 mg (8 mg Intravenous Given 10/5/24 2104)     Medical Decision Making  44-year-old female presents to the ED with constant headache for the past 3 days.  She is hypertensive at 197/96.  Appears slightly uncomfortable.  Afebrile.  She has a nonfocal neurologic exam.   My differential diagnosis includes but is not limited to:  Migraine headache, tension headache, COVID, URI, intracranial mass, intracranial bleed  Plan:  Will obtain CT head as this is an atypical headache for the patient and has been constant for 3 days.  Will order several medications for headache relief including steroids, magnesium, Reglan, fluids and reassess.     Amount and/or Complexity of Data Reviewed  Radiology: ordered.    Risk  Prescription drug management.               ED Course as of 10/05/24 2214   Sat Oct 05, 2024   2210 Patient reassessed after 1st round of medications.  She reports only minimal improvement in headache.  CT head is pending final read.  Patient will be signed out to fellow MIRTA Crystal at shift end pending administration of additional medications and patient response to treatment as well as final disposition.   I have reviewed the patient's records and discussed this case with my supervising physician.      [EH]      ED Course User Index  [EH] Mami Escalera PA-C                           Clinical Impression:  Final  diagnoses:  [R51.9] Intractable headache, unspecified chronicity pattern, unspecified headache type (Primary)                 Mami Escalera PA-C  10/05/24 7603

## 2024-10-07 ENCOUNTER — PATIENT MESSAGE (OUTPATIENT)
Dept: UROGYNECOLOGY | Facility: CLINIC | Age: 44
End: 2024-10-07
Payer: COMMERCIAL

## 2024-10-07 ENCOUNTER — PATIENT OUTREACH (OUTPATIENT)
Dept: EMERGENCY MEDICINE | Facility: HOSPITAL | Age: 44
End: 2024-10-07
Payer: COMMERCIAL

## 2024-10-15 ENCOUNTER — PATIENT MESSAGE (OUTPATIENT)
Dept: INTERNAL MEDICINE | Facility: CLINIC | Age: 44
End: 2024-10-15
Payer: COMMERCIAL

## 2024-10-15 DIAGNOSIS — R09.81 SINUS CONGESTION: Primary | ICD-10-CM

## 2024-10-15 DIAGNOSIS — Z12.31 SCREENING MAMMOGRAM, ENCOUNTER FOR: ICD-10-CM

## 2024-11-25 ENCOUNTER — PATIENT MESSAGE (OUTPATIENT)
Dept: REHABILITATION | Facility: OTHER | Age: 44
End: 2024-11-25
Payer: COMMERCIAL

## 2024-11-29 ENCOUNTER — OFFICE VISIT (OUTPATIENT)
Dept: OTOLARYNGOLOGY | Facility: CLINIC | Age: 44
End: 2024-11-29
Payer: COMMERCIAL

## 2024-11-29 ENCOUNTER — LAB VISIT (OUTPATIENT)
Dept: LAB | Facility: HOSPITAL | Age: 44
End: 2024-11-29
Attending: STUDENT IN AN ORGANIZED HEALTH CARE EDUCATION/TRAINING PROGRAM
Payer: COMMERCIAL

## 2024-11-29 VITALS — WEIGHT: 223.56 LBS | BODY MASS INDEX: 36.08 KG/M2

## 2024-11-29 DIAGNOSIS — H61.23 IMPACTED CERUMEN, BILATERAL: Primary | ICD-10-CM

## 2024-11-29 DIAGNOSIS — J32.9 CHRONIC SINUSITIS, UNSPECIFIED LOCATION: ICD-10-CM

## 2024-11-29 DIAGNOSIS — J34.3 HYPERTROPHY OF BOTH INFERIOR NASAL TURBINATES: ICD-10-CM

## 2024-11-29 DIAGNOSIS — J34.2 NASAL SEPTAL DEVIATION: ICD-10-CM

## 2024-11-29 DIAGNOSIS — R09.81 SINUS CONGESTION: ICD-10-CM

## 2024-11-29 LAB
IGA SERPL-MCNC: 303 MG/DL (ref 40–350)
IGE SERPL-ACNC: 435 IU/ML (ref 0–100)
IGG SERPL-MCNC: 1512 MG/DL (ref 650–1600)
IGM SERPL-MCNC: 153 MG/DL (ref 50–300)

## 2024-11-29 PROCEDURE — 99999 PR PBB SHADOW E&M-EST. PATIENT-LVL IV: CPT | Mod: PBBFAC,,, | Performed by: STUDENT IN AN ORGANIZED HEALTH CARE EDUCATION/TRAINING PROGRAM

## 2024-11-29 PROCEDURE — 86774 TETANUS ANTIBODY: CPT | Performed by: STUDENT IN AN ORGANIZED HEALTH CARE EDUCATION/TRAINING PROGRAM

## 2024-11-29 PROCEDURE — 87075 CULTR BACTERIA EXCEPT BLOOD: CPT | Performed by: STUDENT IN AN ORGANIZED HEALTH CARE EDUCATION/TRAINING PROGRAM

## 2024-11-29 PROCEDURE — 82784 ASSAY IGA/IGD/IGG/IGM EACH: CPT | Performed by: STUDENT IN AN ORGANIZED HEALTH CARE EDUCATION/TRAINING PROGRAM

## 2024-11-29 PROCEDURE — 86684 HEMOPHILUS INFLUENZA ANTIBDY: CPT | Performed by: STUDENT IN AN ORGANIZED HEALTH CARE EDUCATION/TRAINING PROGRAM

## 2024-11-29 PROCEDURE — 82784 ASSAY IGA/IGD/IGG/IGM EACH: CPT | Mod: 59 | Performed by: STUDENT IN AN ORGANIZED HEALTH CARE EDUCATION/TRAINING PROGRAM

## 2024-11-29 PROCEDURE — 86648 DIPHTHERIA ANTIBODY: CPT | Performed by: STUDENT IN AN ORGANIZED HEALTH CARE EDUCATION/TRAINING PROGRAM

## 2024-11-29 PROCEDURE — 82785 ASSAY OF IGE: CPT | Performed by: STUDENT IN AN ORGANIZED HEALTH CARE EDUCATION/TRAINING PROGRAM

## 2024-11-29 PROCEDURE — 86317 IMMUNOASSAY INFECTIOUS AGENT: CPT | Performed by: STUDENT IN AN ORGANIZED HEALTH CARE EDUCATION/TRAINING PROGRAM

## 2024-11-29 PROCEDURE — 87070 CULTURE OTHR SPECIMN AEROBIC: CPT | Performed by: STUDENT IN AN ORGANIZED HEALTH CARE EDUCATION/TRAINING PROGRAM

## 2024-11-29 RX ORDER — DOXYCYCLINE HYCLATE 100 MG
100 TABLET ORAL 2 TIMES DAILY
Qty: 20 TABLET | Refills: 0 | Status: SHIPPED | OUTPATIENT
Start: 2024-11-29 | End: 2024-12-09

## 2024-11-29 NOTE — PROGRESS NOTES
Otolaryngology - Head and Neck Surgery New Patient Visit    11/29/2024    Referring Provider: Self, Aaareferral    Chief Complaint   Patient presents with    Sinus Problem       History of Present Illness, Otolaryngology Specialty-Specific Exam, and Assessment and Plan:     Cristina Man is a 44 y.o. female who presents for evaluation of chronic sinus issues, which has been present for over 10 years. She complains of mostly facial pressure and headaches and feeling of fullness behind her right eye.  She reports having associated nasal congestion. She denies purulent nasal drainage, vision changes, epistaxis, anosmia or previous nasal trauma. She has been treated with Flonase in the past. She has never had allergy testing. She denies previous skullbase surgery. She denies snoring.     She was have remote history of asthma and is on albuterol as needed.    SNOT-22 score: : (Patient-Rptd) (P) 56    She had a CT of the sinuses done on 10/5/24 which I reviewed along with the associated radiology report.    On exam today, the ears are normal. The oral cavity is clear. The neck is clear. The nasopharynx, hypopharynx, and larynx are normal. Nasal endoscopy reveals right septal deviation with spur. Hypertrophic inferior turbinates bilaterally. No nasal masses or nasal polyposis.  Scant purulent drainage in the middle meatus. Nasopharynx clear without lesions. Eustachian tubes WNL.  .     Impression today is chronic sinusitis, allergic rhinitis . I have recommended that she start on high volume sinus irrigations with budesonide and Astelin.    Given findings (examination, sinonasal endoscopy, and/or imaging performed and reviewed) and her history related to these sinonasal issues that have been refractory to current medical management, I feel escalation of medical management is indicated at this time. I have specifically recommended budesonide irrigations for stronger topical steroid therapy. This is intended to both  improve overall management and symptom control, and to reduce potential need for systemic steroids and the associated adverse effects and risks associated with recurrent systemic steroid therapy. I counseled her on the off-label nature of this particular use/delivery of budesonide, and she consented to use.     -Specific instructions regarding the use of budesonide nasal irrigations were provided, including review of use of standard nasal saline irrigations and the addition of budesonide to these nasal saline irrigations. Instructions were also provided on obtaining this medication and a prescription was sent in for the patient (to [facility]) so that home delivery/mail order can be arranged. I have authorized once daily dosing if financial issues prevent twice daily use (recommended), as well as options for alternative steroids to be used should the advised budesonide be prohibitively expensive. All questions regarding this were answered, and I encouraged her to call for any additional questions, concerns, or if there were any issues with obtaining the budesonide for use in irrigations.      For will rule out work to assess her immune system and immunoglobulin levels.    I have prescribed a course of doxycycline for current symptoms. Cultures of the sinuses were obtained today, if the antibiotics need to be changed based on the sensitivities I will adjust them as needed.      She will follow up me in 2 months.  If no improvement we will consider interval imaging.     Thank you for allowing us to participate in the care of your patient. We will continue to keep you informed of her progress.    Sincerely yours,    Derek Hernandez MD      Objective     Physical Examination  Vitals -  weight is 101.4 kg (223 lb 8.7 oz).   Constitutional - General appearance: Normal. Ability to communicate: Normal.  Head & Face - Overall appearance, scars, masses: Normal. Palpation &/or percussion of face: Normal. Salivary  glands: Normal. Facial strength: Normal  ENMT - Otoscopic exam:  Bilateral cerumen impaction. Assessment of hearing: Normal. External inspection: Normal. Nasal mucosa, septum, turbinates: Abnormal see exam details. Lips, teeth, gums: Normal. Oropharynx: Normal. Pharyngeal walls/pyriform sinus: Normal. Larynx: Normal. Nasopharynx: Normal  Neck - Neck: Normal. Thyroid: Normal  Lymphatic - Palpation of lymph nodes: Normal  Eyes - Ocular mobility: Normal  Respiratory - Inspection of Chest: Normal  Cardiovascular - Peripheral vascular system: Normal  Neurological/Psychiatric - Orientation: Normal    Review of Systems  Review of Systems   Constitutional:  Positive for malaise/fatigue.   HENT:  Positive for sore throat.    Eyes:  Positive for photophobia.   Respiratory:  Positive for shortness of breath and wheezing.    Cardiovascular:  Positive for chest pain.   Gastrointestinal:  Positive for constipation and heartburn.   Musculoskeletal:  Positive for back pain and neck pain.   Skin: Negative.    Neurological:  Positive for dizziness and headaches.   Psychiatric/Behavioral:  The patient is nervous/anxious.       Answers submitted by the patient for this visit:  Review of Symptoms Questionnaire  (Submitted on 11/29/2024)  sinus pressure : Yes  Sinus infection(s)?: Yes  Voice Change?: Yes  eye itching: Yes  Acid Reflux?: Yes  Urinating too frequently?: Yes  Muscle aches / pain?: Yes  Seasonal Allergies?: Yes  Cold all of the time? : Yes  Hot all of the time? : Yes  Light-headedness: Yes  swollen glands: Yes    A complete review of systems was obtained 11/29/2024 and reviewed.  The review of systems is negative for symptoms except as described above.    Wt 101.4 kg (223 lb 8.7 oz)   LMP 04/27/2015   BMI 36.08 kg/m²      Nasal Endoscopy:  11/29/2024    The use of diagnostic nasal endoscopy was considered medically necessary for the evaluation and visualization of the nasal anatomy for symptoms suggestive of nasal or sinus  "origin. Physical examination (including a nasal speculum evaluation) did not provide sufficient clinical information to establish a diagnosis, or symptoms did not improve or worsened following treatment.     The nasal cavity was decongested with topical 1% phenylephrine and anesthetized with 4% lidocaine.  A rigid 0-degree endoscope was introduced into the nasal cavity.    The patient was seated in the examination chair. After discussion of risks and benefits, a nasal endoscope was inserted into the nose the endoscope was passed along the left nasal floor to the nasopharynx. It was then passed between the middle and superior meatus, nasal turbinates, nasal septum, nasopharynx and sphenoethmoid region. The nasal endoscope was withdrawn and there was no complications. An identical procedure was performed on the right side. I was present for the entire procedure.The patient tolerated the above procedure well. The findings of this procedure can be found in the dictated note from 11/29/2024 visit.                                Cerumen Removal    Indication: Cerumen impaction    Impacted cerumen was present in the Direct Ophthalmoscopy  and lateral ear canal on both sides.  Informed consent was obtained prior to proceeding.  The impaction was completely removed on right under direct visualization using a suction cannula. Left cerumen imp[action was too dry to remove. I performed the procedure myself.  There was no significant trauma.    The patient tolerated the procedure well.    Data Reviewed    WBC (K/uL)   Date Value   05/13/2023 5.29     Eosinophil % (%)   Date Value   05/13/2023 4.7     Eos # (K/uL)   Date Value   05/13/2023 0.3     Platelets (K/uL)   Date Value   05/13/2023 302     Glucose (mg/dL)   Date Value   11/06/2023 98     No results found for: "IGE"    I independently reviewed the images of the [facility] dated 10/5/24. Pertinent findings include Right septal deviation, patchy ethmoid sinus opacification " bilaterally.  Narrowing of her ostiomeatal complexes bilaterally.  No significant opacification of the frontal sphenoid sinuses.  Incomplete view of maxillary sinuses bilaterally.

## 2024-11-30 LAB — BACTERIA SPEC AEROBE CULT: NORMAL

## 2024-12-02 ENCOUNTER — IMMUNIZATION (OUTPATIENT)
Dept: INTERNAL MEDICINE | Facility: CLINIC | Age: 44
End: 2024-12-02
Payer: COMMERCIAL

## 2024-12-02 ENCOUNTER — OFFICE VISIT (OUTPATIENT)
Dept: INTERNAL MEDICINE | Facility: CLINIC | Age: 44
End: 2024-12-02
Payer: COMMERCIAL

## 2024-12-02 VITALS
DIASTOLIC BLOOD PRESSURE: 86 MMHG | OXYGEN SATURATION: 99 % | HEART RATE: 88 BPM | BODY MASS INDEX: 35.97 KG/M2 | WEIGHT: 222.88 LBS | SYSTOLIC BLOOD PRESSURE: 142 MMHG

## 2024-12-02 DIAGNOSIS — E66.812 CLASS 2 SEVERE OBESITY DUE TO EXCESS CALORIES WITH SERIOUS COMORBIDITY AND BODY MASS INDEX (BMI) OF 35.0 TO 35.9 IN ADULT: ICD-10-CM

## 2024-12-02 DIAGNOSIS — Z23 NEED FOR VACCINATION: ICD-10-CM

## 2024-12-02 DIAGNOSIS — R73.03 PREDIABETES: ICD-10-CM

## 2024-12-02 DIAGNOSIS — J45.41 MODERATE PERSISTENT ASTHMA WITH ACUTE EXACERBATION: ICD-10-CM

## 2024-12-02 DIAGNOSIS — Z23 NEED FOR VACCINATION: Primary | ICD-10-CM

## 2024-12-02 DIAGNOSIS — I10 BENIGN ESSENTIAL HYPERTENSION: ICD-10-CM

## 2024-12-02 DIAGNOSIS — J45.20 MILD INTERMITTENT ASTHMA WITH ALLERGIC RHINITIS WITHOUT COMPLICATION: ICD-10-CM

## 2024-12-02 DIAGNOSIS — Z00.00 VISIT FOR ANNUAL HEALTH EXAMINATION: Primary | ICD-10-CM

## 2024-12-02 DIAGNOSIS — E66.01 CLASS 2 SEVERE OBESITY DUE TO EXCESS CALORIES WITH SERIOUS COMORBIDITY AND BODY MASS INDEX (BMI) OF 35.0 TO 35.9 IN ADULT: ICD-10-CM

## 2024-12-02 PROBLEM — H61.23 BILATERAL IMPACTED CERUMEN: Status: RESOLVED | Noted: 2019-06-10 | Resolved: 2024-12-02

## 2024-12-02 PROCEDURE — G0008 ADMIN INFLUENZA VIRUS VAC: HCPCS | Mod: S$GLB,,, | Performed by: INTERNAL MEDICINE

## 2024-12-02 PROCEDURE — 99396 PREV VISIT EST AGE 40-64: CPT | Mod: S$GLB,,, | Performed by: INTERNAL MEDICINE

## 2024-12-02 PROCEDURE — 99999 PR PBB SHADOW E&M-EST. PATIENT-LVL V: CPT | Mod: PBBFAC,,, | Performed by: INTERNAL MEDICINE

## 2024-12-02 PROCEDURE — 90656 IIV3 VACC NO PRSV 0.5 ML IM: CPT | Mod: S$GLB,,, | Performed by: INTERNAL MEDICINE

## 2024-12-02 PROCEDURE — 3077F SYST BP >= 140 MM HG: CPT | Mod: CPTII,S$GLB,, | Performed by: INTERNAL MEDICINE

## 2024-12-02 PROCEDURE — 1160F RVW MEDS BY RX/DR IN RCRD: CPT | Mod: CPTII,S$GLB,, | Performed by: INTERNAL MEDICINE

## 2024-12-02 PROCEDURE — 91320 SARSCV2 VAC 30MCG TRS-SUC IM: CPT | Mod: S$GLB,,, | Performed by: INTERNAL MEDICINE

## 2024-12-02 PROCEDURE — 3079F DIAST BP 80-89 MM HG: CPT | Mod: CPTII,S$GLB,, | Performed by: INTERNAL MEDICINE

## 2024-12-02 PROCEDURE — 90480 ADMN SARSCOV2 VAC 1/ONLY CMP: CPT | Mod: S$GLB,,, | Performed by: INTERNAL MEDICINE

## 2024-12-02 PROCEDURE — 3008F BODY MASS INDEX DOCD: CPT | Mod: CPTII,S$GLB,, | Performed by: INTERNAL MEDICINE

## 2024-12-02 PROCEDURE — 1159F MED LIST DOCD IN RCRD: CPT | Mod: CPTII,S$GLB,, | Performed by: INTERNAL MEDICINE

## 2024-12-02 RX ORDER — AMLODIPINE BESYLATE 5 MG/1
5 TABLET ORAL DAILY
Qty: 90 TABLET | Refills: 3 | Status: SHIPPED | OUTPATIENT
Start: 2024-12-02 | End: 2025-12-02

## 2024-12-02 RX ORDER — PREDNISONE 20 MG/1
40 TABLET ORAL DAILY
Qty: 10 TABLET | Refills: 0 | Status: SHIPPED | OUTPATIENT
Start: 2024-12-02 | End: 2024-12-07

## 2024-12-02 RX ORDER — FLUTICASONE PROPIONATE AND SALMETEROL 500; 50 UG/1; UG/1
1 POWDER RESPIRATORY (INHALATION) 2 TIMES DAILY
Qty: 60 EACH | Refills: 11 | Status: SHIPPED | OUTPATIENT
Start: 2024-12-02 | End: 2025-12-02

## 2024-12-02 RX ORDER — ALBUTEROL SULFATE 90 UG/1
2 INHALANT RESPIRATORY (INHALATION) EVERY 6 HOURS PRN
Qty: 54 G | Refills: 2 | Status: SHIPPED | OUTPATIENT
Start: 2024-12-02

## 2024-12-02 NOTE — PROGRESS NOTES
INTERNAL MEDICINE ESTABLISHED PATIENT VISIT NOTE    Subjective:     Chief Complaint: Annual Exam       Patient ID: Cristina Man is a 44 y.o. female with HTN, preDM, mild intermittent asthma with allergic rhinitis, history of migraines, history of anemia resolved, obesity, last seen by me 11/2023, here today for annual exam.    At last visit c me, had worsening sob so Advair changed to Trelegy and rec f/u c pulm if sx fail to improve.  Had apparent worsening of her sx but also thinks she never took the Trelegy.    Had recent sinus infection c ear pain and seen by ENT and rx'ed Doxycyline which helped c sinus pain and ear pain but states she has been wheezing and requesting steroids.    States she has not been taking rx'ed Amlodipine for HTN and requesting refill on meds.    Past Medical History:  Past Medical History:   Diagnosis Date    Abnormal Pap smear of cervix     Allergic rhinitis due to allergen     Asthma at age 4    Hypertension     Migraines        Home Medications:  Prior to Admission medications    Medication Sig Start Date End Date Taking? Authorizing Provider   albuterol (ACCUNEB) 0.63 mg/3 mL Nebu USE 3 ML IN NEBULIZER EVERY 6 HOURS AS NEEDED FOR WHEEZING 1/17/24   Madelaine Hayden MD   albuterol (PROVENTIL/VENTOLIN HFA) 90 mcg/actuation inhaler INHALE 2 PUFFS BY MOUTH INTO THE LUNGS EVERY 6 HOURS AS NEEDED FOR WHEEZING. RESCUE 2/18/24   Madelaine Hayden MD   amLODIPine (NORVASC) 5 MG tablet Take 1 tablet (5 mg total) by mouth once daily. 5/10/23 5/9/24  Madelaine Hayden MD   azelastine (ASTELIN) 137 mcg (0.1 %) nasal spray 1 spray (137 mcg total) by Nasal route 2 (two) times daily. 5/10/23 5/9/24  Madelaine Hayden MD   carbamide peroxide (DEBROX) 6.5 % otic solution Place 5 drops into the left ear 2 (two) times daily. for 14 days 11/29/24 12/13/24  Derek Hernandez MD   doxycycline (VIBRA-TABS) 100 MG tablet Take 1 tablet (100 mg total) by mouth 2 (two) times daily. for 10 days 11/29/24 12/9/24  Derek Hernandez MD    ergocalciferol (ERGOCALCIFEROL) 50,000 unit Cap Take 1 capsule (50,000 Units total) by mouth every 7 days. 5/10/23   Madelaine Hayden MD   estradioL (ESTRACE) 0.01 % (0.1 mg/gram) vaginal cream Place 0.5 g vaginally twice a week. 9/26/24   Max Cagle PA-C   fluticasone propionate (FLONASE) 50 mcg/actuation nasal spray 1 spray (50 mcg total) by Each Nostril route once daily. 5/10/23   Madelaine Hayden MD   fluticasone-salmeterol diskus inhaler 500-50 mcg Inhale 1 puff into the lungs 2 (two) times daily. Controller 11/21/23 11/20/24  Madelaine Hayden MD   ibuprofen (ADVIL,MOTRIN) 600 MG tablet Take 1 tablet (600 mg total) by mouth every 8 (eight) hours as needed for Pain. 1/2/24   Madelaine Hayden MD   ketorolac 0.4% (ACULAR) 0.4 % Drop Place into both eyes. 4/3/23   Provider, Historical   levocetirizine (XYZAL) 5 MG tablet Take 1 tablet (5 mg total) by mouth every evening. 5/10/23 5/9/24  Madelaine Hayden MD   LIDOcaine-prilocaine (EMLA) cream Apply topically 2 (two) times daily as needed. 9/6/23   Tova Kitchen PA-C   nebulizer and compressor Gely Use as directed 11/21/23      neomycin-polymyxin-dexamethasone (MAXITROL) 3.5mg/mL-10,000 unit/mL-0.1 % DrpS Place into both eyes. 4/3/23   Provider, Historical   ondansetron (ZOFRAN-ODT) 4 MG TbDL Take 1 tablet (4 mg total) by mouth every 6 (six) hours as needed. 10/6/24   Lexus Crystal PA-C   phenazopyridine (PYRIDIUM) 200 MG tablet Take 1 tablet (200 mg total) by mouth 3 (three) times daily as needed for Pain. 9/25/24   Capdau Quitzau, Max, PA-C   promethazine-dextromethorphan (PROMETHAZINE-DM) 6.25-15 mg/5 mL Syrp Take 5 mLs by mouth every 8 (eight) hours as needed (cough). 12/30/23   Nannette Nichols, NP   solifenacin (VESICARE) 10 MG tablet Take 1 tablet (10 mg total) by mouth once daily. 10/31/22 12/30/23  Ernestine Chatman NP   tiZANidine (ZANAFLEX) 4 MG tablet TAKE 1 TABLET(4 MG) BY MOUTH EVERY NIGHT AS NEEDED 8/5/22   Saadia Strong, PASANAZ   vibegron  (GEMTESA) 75 mg Tab Take 1 tablet (75 mg total) by mouth once daily. 9/25/24   Max Cagle PA-C       Allergies:  Review of patient's allergies indicates:  No Known Allergies    Social History:  Social History     Tobacco Use    Smoking status: Never    Smokeless tobacco: Never   Substance Use Topics    Alcohol use: Yes     Comment: occ    Drug use: No        Review of Systems   Constitutional:  Negative for appetite change, chills, fatigue, fever and unexpected weight change.   HENT:  Negative for congestion, hearing loss and rhinorrhea.    Eyes:  Negative for pain and visual disturbance.   Respiratory:  Positive for shortness of breath and wheezing. Negative for cough and chest tightness.    Cardiovascular:  Negative for chest pain, palpitations and leg swelling.   Gastrointestinal:  Negative for abdominal distention and abdominal pain.   Endocrine: Negative for polydipsia and polyuria.   Genitourinary:  Negative for decreased urine volume, difficulty urinating, dysuria, hematuria and vaginal discharge.   Neurological:  Negative for weakness, numbness and headaches.   Psychiatric/Behavioral:  Negative for behavioral problems and confusion.          Health Maintenance:     Immunizations:   Influenza 11/2023, rec repeat today.  TDap 5/2023  Pneumovax 12/2019 based on dx of asthma, Prevnar 20 5/2023  Shingrix rec at 49  COVID vacc 3/2021, 4/2021, 1/2022, rec booster now.     Cancer Screening:  PAP: hx hyst 2/2 irregular bleeding, states cervix removed, benign, still has ovaries, sees Dr. Jovel, last seen 9/2022  Mammogram:  9/2023 benign, has repeat scheduled for tomorrow.  Colonoscopy:  3/2024 mult polyps removed, adenomatous on path, repeat in 3 yrs per report.      Objective:   BP (!) 142/86   Pulse 88   Wt 101.1 kg (222 lb 14.2 oz)   LMP 04/27/2015   SpO2 99%   BMI 35.97 kg/m²        General: AAO x3, no apparent distress  HEENT: no cervical LAD, no thyroid masses appreciated  CV: RRR, no  m/r/g  Pulm: Lungs CTAB, no crackles, no wheezes  Abd: s/NT/ND +BS  Extremities: no c/c/e    Labs:     Lab Results   Component Value Date    WBC 5.29 05/13/2023    HGB 12.9 05/13/2023    HCT 38.8 05/13/2023    MCV 92 05/13/2023     05/13/2023     Sodium   Date Value Ref Range Status   11/06/2023 140 136 - 145 mmol/L Final     Potassium   Date Value Ref Range Status   11/06/2023 3.8 3.5 - 5.1 mmol/L Final     Chloride   Date Value Ref Range Status   11/06/2023 107 95 - 110 mmol/L Final     CO2   Date Value Ref Range Status   11/06/2023 23 23 - 29 mmol/L Final     Glucose   Date Value Ref Range Status   11/06/2023 98 70 - 110 mg/dL Final     BUN   Date Value Ref Range Status   11/06/2023 12 6 - 20 mg/dL Final     Creatinine   Date Value Ref Range Status   11/06/2023 0.8 0.5 - 1.4 mg/dL Final     Calcium   Date Value Ref Range Status   11/06/2023 9.3 8.7 - 10.5 mg/dL Final     Total Protein   Date Value Ref Range Status   11/06/2023 7.7 6.0 - 8.4 g/dL Final     Albumin   Date Value Ref Range Status   11/06/2023 3.9 3.5 - 5.2 g/dL Final     Total Bilirubin   Date Value Ref Range Status   11/06/2023 0.3 0.1 - 1.0 mg/dL Final     Comment:     For infants and newborns, interpretation of results should be based  on gestational age, weight and in agreement with clinical  observations.    Premature Infant recommended reference ranges:  Up to 24 hours.............<8.0 mg/dL  Up to 48 hours............<12.0 mg/dL  3-5 days..................<15.0 mg/dL  6-29 days.................<15.0 mg/dL       Alkaline Phosphatase   Date Value Ref Range Status   11/06/2023 104 55 - 135 U/L Final     AST   Date Value Ref Range Status   11/06/2023 13 10 - 40 U/L Final     ALT   Date Value Ref Range Status   11/06/2023 13 10 - 44 U/L Final     Anion Gap   Date Value Ref Range Status   11/06/2023 10 8 - 16 mmol/L Final     eGFR if    Date Value Ref Range Status   12/22/2021 >60.0 >60 mL/min/1.73 m^2 Final     eGFR if non     Date Value Ref Range Status   12/22/2021 >60.0 >60 mL/min/1.73 m^2 Final     Comment:     Calculation used to obtain the estimated glomerular filtration  rate (eGFR) is the CKD-EPI equation.        Lab Results   Component Value Date    HGBA1C 5.9 (H) 11/06/2023     Lab Results   Component Value Date    LDLCALC 124.6 05/13/2023     Lab Results   Component Value Date    TSH 0.669 05/13/2023         Assessment/Plan     Cristina was seen today for annual exam.    Diagnoses and all orders for this visit:    Visit for annual health examination  History and physical exam completed.  Health maintenance reviewed as above.  -     CBC Auto Differential; Future  -     Comprehensive Metabolic Panel; Future  -     Hemoglobin A1C; Future  -     Lipid Panel; Future  -     TSH; Future  -     Vitamin D; Future    Prediabetes  Lab Results   Component Value Date    HGBA1C 5.9 (H) 11/06/2023     Pt was counseled on the need to avoid intake of simple sugars and minimize carbohydrates.  Also recommended weight loss and daily exercise.    Benign essential hypertension  Elevated today, hx intermittent compliance c meds  Has not been taking Amlodipine  Will restart, refills sent  -     amLODIPine (NORVASC) 5 MG tablet; Take 1 tablet (5 mg total) by mouth once daily.    Mild intermittent asthma with allergic rhinitis without complication  -     Ambulatory referral/consult to Allergy; Future  -     albuterol (PROVENTIL/VENTOLIN HFA) 90 mcg/actuation inhaler; Inhale 2 puffs into the lungs every 6 (six) hours as needed for Wheezing.    Moderate persistent asthma with acute exacerbation  As per HPI  Lungs clear on exam but report she just used her inhaler and has been having recent worsening of sx, okay for 5 days of Prednisone, rec est care c allergy clinic to assist c mgmt  Emphasized compliance c controller meds  -     fluticasone-salmeterol diskus inhaler 500-50 mcg; Inhale 1 puff into the lungs 2 (two) times daily.  Controller  -     predniSONE (DELTASONE) 20 MG tablet; Take 2 tablets (40 mg total) by mouth once daily. for 5 days    Class 2 severe obesity due to excess calories with serious comorbidity and body mass index (BMI) of 35.0 to 35.9 in adult  Counseled extensively on need for diet changes and daily exercise.  Discussed examples of healthy eating.  Recommend at least 30 minutes of exercise at least 5 days a week.    Pt interested in Semaglutide  Discussed this is not covered by insurance, pt willing to pay out of pocket via [facility]  Will check labs first  Risks and benefits were discussed with patient.    Need for vaccination  -     Influenza - Trivalent - PF (ADULT)      HM as above  RTC in 3 mos for f/u HTN and weight, sooner if needed.    Madelaine Hayden MD  Department of Internal Medicine - [facility]  12/02/2024

## 2024-12-03 ENCOUNTER — PATIENT MESSAGE (OUTPATIENT)
Dept: OTOLARYNGOLOGY | Facility: CLINIC | Age: 44
End: 2024-12-03
Payer: COMMERCIAL

## 2024-12-03 ENCOUNTER — CLINICAL SUPPORT (OUTPATIENT)
Dept: REHABILITATION | Facility: OTHER | Age: 44
End: 2024-12-03
Payer: COMMERCIAL

## 2024-12-03 ENCOUNTER — PATIENT MESSAGE (OUTPATIENT)
Dept: INTERNAL MEDICINE | Facility: CLINIC | Age: 44
End: 2024-12-03
Payer: COMMERCIAL

## 2024-12-03 ENCOUNTER — OFFICE VISIT (OUTPATIENT)
Dept: OBSTETRICS AND GYNECOLOGY | Facility: CLINIC | Age: 44
End: 2024-12-03
Attending: OBSTETRICS & GYNECOLOGY
Payer: COMMERCIAL

## 2024-12-03 ENCOUNTER — HOSPITAL ENCOUNTER (OUTPATIENT)
Dept: RADIOLOGY | Facility: OTHER | Age: 44
Discharge: HOME | End: 2024-12-03
Attending: INTERNAL MEDICINE
Payer: COMMERCIAL

## 2024-12-03 VITALS
DIASTOLIC BLOOD PRESSURE: 100 MMHG | SYSTOLIC BLOOD PRESSURE: 150 MMHG | BODY MASS INDEX: 36.17 KG/M2 | WEIGHT: 225.06 LBS | HEIGHT: 66 IN

## 2024-12-03 DIAGNOSIS — Z12.31 SCREENING MAMMOGRAM, ENCOUNTER FOR: ICD-10-CM

## 2024-12-03 DIAGNOSIS — E55.9 VITAMIN D DEFICIENCY: Primary | ICD-10-CM

## 2024-12-03 DIAGNOSIS — J32.9 CHRONIC SINUSITIS, UNSPECIFIED LOCATION: Primary | ICD-10-CM

## 2024-12-03 DIAGNOSIS — R27.8 COORDINATION IMPAIRMENT: ICD-10-CM

## 2024-12-03 DIAGNOSIS — M62.89 PELVIC FLOOR DYSFUNCTION: Primary | ICD-10-CM

## 2024-12-03 DIAGNOSIS — Z12.31 VISIT FOR SCREENING MAMMOGRAM: ICD-10-CM

## 2024-12-03 DIAGNOSIS — Z01.419 WOMEN'S ANNUAL ROUTINE GYNECOLOGICAL EXAMINATION: Primary | ICD-10-CM

## 2024-12-03 DIAGNOSIS — N32.81 OAB (OVERACTIVE BLADDER): ICD-10-CM

## 2024-12-03 DIAGNOSIS — Z90.710 HISTORY OF HYSTERECTOMY: ICD-10-CM

## 2024-12-03 DIAGNOSIS — Z12.4 PAP SMEAR FOR CERVICAL CANCER SCREENING: ICD-10-CM

## 2024-12-03 DIAGNOSIS — K59.00 CONSTIPATION, UNSPECIFIED CONSTIPATION TYPE: ICD-10-CM

## 2024-12-03 DIAGNOSIS — N39.3 STRESS INCONTINENCE: ICD-10-CM

## 2024-12-03 PROCEDURE — 1159F MED LIST DOCD IN RCRD: CPT | Mod: CPTII,S$GLB,, | Performed by: OBSTETRICS & GYNECOLOGY

## 2024-12-03 PROCEDURE — 3080F DIAST BP >= 90 MM HG: CPT | Mod: CPTII,S$GLB,, | Performed by: OBSTETRICS & GYNECOLOGY

## 2024-12-03 PROCEDURE — 88175 CYTOPATH C/V AUTO FLUID REDO: CPT | Performed by: OBSTETRICS & GYNECOLOGY

## 2024-12-03 PROCEDURE — 97161 PT EVAL LOW COMPLEX 20 MIN: CPT | Mod: PN | Performed by: PHYSICAL THERAPIST

## 2024-12-03 PROCEDURE — 99999 PR PBB SHADOW E&M-EST. PATIENT-LVL IV: CPT | Mod: PBBFAC,,, | Performed by: OBSTETRICS & GYNECOLOGY

## 2024-12-03 PROCEDURE — 3008F BODY MASS INDEX DOCD: CPT | Mod: CPTII,S$GLB,, | Performed by: OBSTETRICS & GYNECOLOGY

## 2024-12-03 PROCEDURE — 97530 THERAPEUTIC ACTIVITIES: CPT | Mod: PN | Performed by: PHYSICAL THERAPIST

## 2024-12-03 PROCEDURE — 99396 PREV VISIT EST AGE 40-64: CPT | Mod: S$GLB,,, | Performed by: OBSTETRICS & GYNECOLOGY

## 2024-12-03 PROCEDURE — 77063 BREAST TOMOSYNTHESIS BI: CPT | Mod: 26,,, | Performed by: RADIOLOGY

## 2024-12-03 PROCEDURE — 77067 SCR MAMMO BI INCL CAD: CPT | Mod: TC

## 2024-12-03 PROCEDURE — 77067 SCR MAMMO BI INCL CAD: CPT | Mod: 26,,, | Performed by: RADIOLOGY

## 2024-12-03 PROCEDURE — 1160F RVW MEDS BY RX/DR IN RCRD: CPT | Mod: CPTII,S$GLB,, | Performed by: OBSTETRICS & GYNECOLOGY

## 2024-12-03 PROCEDURE — 3077F SYST BP >= 140 MM HG: CPT | Mod: CPTII,S$GLB,, | Performed by: OBSTETRICS & GYNECOLOGY

## 2024-12-03 RX ORDER — SULFAMETHOXAZOLE AND TRIMETHOPRIM 800; 160 MG/1; MG/1
1 TABLET ORAL 2 TIMES DAILY
Qty: 20 TABLET | Refills: 0 | Status: SHIPPED | OUTPATIENT
Start: 2024-12-03 | End: 2024-12-13

## 2024-12-03 RX ORDER — ERGOCALCIFEROL 1.25 MG/1
50000 CAPSULE ORAL
Qty: 12 CAPSULE | Refills: 3 | Status: SHIPPED | OUTPATIENT
Start: 2024-12-03

## 2024-12-03 NOTE — PLAN OF CARE
[facility]   Pelvic Health Physical Therapy Initial Evaluation      Date: 12/3/2024   Name: Cristina Man  Clinic Number: [id number]    Therapy Diagnosis:   Encounter Diagnoses   Name Primary?    Pelvic floor dysfunction Yes    Coordination impairment      Referring Provider: Max Cagle PA-C    Referring Provider Orders: PT Eval and Treat  Medical Diagnosis from Referral: OAB (overactive bladder) [N32.81], Stress incontinence [N39.3], Constipation, unspecified constipation type [K59.00   Evaluation Date: 12/3/2024  Authorization Period Expiration: 2024  Plan of Care Expiration: 3/3/2025  Visit # / Visits authorized: 1/pending  FOTO: 1 (12/3/2024)    Precautions: standard    Time In: 10:40  Time Out: 11:18  Total Appointment Time (timed & untimed codes): 38 minutes    SUBJECTIVE     Date of onset: chronic  History of current condition: Cristina reports urinary urgency, stress urinary incontinence, nocturia, urinary frequency, and difficulty with bowel movements    OB/GYN HISTORY -  (vaginal deliveries), hysterectomy with abdominal scar    BLADDER HISTORY  Frequency of urination:   Day: every 30-60 minutes           Night: 4+  Difficulty initiating urine stream: Yes, for last bit of urine  Hover over toilet seats: Yes  Urine stream: strong, sometimes interrupted  Complete emptying: No  Post-void dribbling: Yes  Urinary Urgency: Yes  Bladder leakage: Yes - pressure on the belly, coughing, sneezing  Frequency of incidents: weekly    BOWEL HISTORY  Frequency of bowel movements: every couple of days  Difficulty initiating BM: Yes  Quality/shape of BM: [place] Stool Chart 2-4  Incomplete emptying? Yes  Fiber supplements, probiotics or laxative use? Yes - currently using stool softeners daily, considering fiber supplement  Colon leakage: No    SEXUAL/PELVIC PAIN HISTORY  Sexually active? Yes   Pain with vaginal exams, intercourse or tampon use? No - some dryness  Pain with wearing  certain clothes, sitting on certain surfaces? No  Feeling of pelvic heaviness? Yes    Pain: chronic lower back pain    Imaging: none pertinent to the pelvic floor    Prior Therapy: no prior pelvic floor therapy  Social History: lives in a house with steps to enter, with family  Current exercise: none  Occupation: works for [employer]  Prior Level of Function: no pelvic floor dysfunction  Current Level of Function: pelvic floor symptoms (urinary urgency, stress urinary incontinence, nocturia, urinary frequency, and difficulty with bowel movements) that limit tolerance for ADLs and functional mobility    Types of fluid intake: more than 32 oz water/ not usually 64oz of water, 1 cup of coffee  Diet: likely not enough fiber  Habitus: well developed, well nourished  Abuse/Neglect: none reported    Patients goals: improve urinary frequency and leakage     Medical History: Cristina  has a past medical history of Abnormal Pap smear of cervix, Allergic rhinitis due to allergen, Asthma (at age 4), Hypertension, and Migraines.     Surgical History: Cristina Man  has a past surgical history that includes Hemorrhoid surgery (2009); Hysterectomy (2015); Reduction of both breasts (Bilateral, 06/12/2020); Total Reduction Mammoplasty; and Colonoscopy (N/A, 3/11/2024).    Medications: Cristina has a current medication list which includes the following prescription(s): albuterol, albuterol, amlodipine, azelastine, carbamide peroxide, doxycycline, ergocalciferol, estradiol, fluticasone propionate, fluticasone-salmeterol 500-50 mcg/dose, ibuprofen, ketorolac 0.4%, levocetirizine, lidocaine-prilocaine, nebulizer and compressor, neomycin-polymyxin-dexamethasone, ondansetron, phenazopyridine, prednisone, promethazine-dextromethorphan, solifenacin, tizanidine, and gemtesa, and the following Facility-Administered Medications: EPINEPHrine 1,000 mcg in lactated Ringers 1,000 mL irrigation and EPINEPHrine 1,000 mcg in  lactated Ringers 1,000 mL irrigation.    Allergies: Review of patient's allergies indicates:  No Known Allergies     OBJECTIVE     Limitation/Restriction for FOTO Pelvic Floor Surveys    Therapist reviewed FOTO scores for Cristina Man on 12/3/2024  FOTO documents entered into [employer] - see Media section.    Limitation Score:          TREATMENT     Total Treatment time (time-based codes) separate from the evaluation: 23 minutes     Therapeutic activities to improve functional performance for 23 minutes -  [x] Education on pelvic floor anatomy, function, and assessment  [x] Education on normal void intervals and fluid intake  [x] Education on basic constipation management  [x] Instruction on double void technique  [x] Education on voiding optimization - seated on toilet, no pushing, pelvic floor squeeze upon completion  [x] Education on bowel movement optimization - toilet stool  [x] HEP building/HEP review    PATIENT EDUCATION AND HOME EXERCISES     Education provided: general anatomy/physiology of urinary & bowel system, benefits of treatment, and alternative methods of treatment were discussed with the patient. Additionally, Cristina was provided education on pt prognosis, PT plan of care, consequences of elevated pelvic floor muscle tension, relationship between hips & PFM, relationship between pelvic floor dysfunction & lumbar spine/hip/pelvic girdle dysfunction, urge suppression, etiology of urinary urgency, etiology of constipation, conservative management of constipation (water, fiber, exercise), proper body mechanics for bowel movement, bowel movement consistency goals, and proper body mechanics and technique for urination    Written Home Exercises provided: yes  Exercises were reviewed, and Cristina was able to demonstrate them prior to the end of the session. Cristina demonstrated good understanding of the education provided. See EMR under 'Patient Instructions' for exercises and education provided during  session.    ASSESSMENT     Cristina is a 44 y.o. female referred to outpatient physical therapy with a medical diagnosis of OAB (overactive bladder) [N32.81], Stress incontinence [N39.3], Constipation, unspecified constipation type [K59.00]  and additional complaints of urinary urgency, nocturia, urinary frequency, and difficulty with bowel movements. Symptoms impair the patient's ability to perform ADLs and functional mobility, as well as remain continent.  No direct pelvic floor examination performed today, but pt presentation and subjective report suggest pelvic floor dysfunction - tension vs. weakness.  Pt will benefit from pelvic floor muscle training, bladder habit retraining, bowel habit retraining, constipation management, and patient education    Patient prognosis is good  Cristina will benefit from skilled outpatient physical therapy to address the deficits stated above and in the chart below, provide patient/family education, and to maximize the patient's level of independence.     Plan of care discussed with patient: yes  Patient's spiritual, cultural and educational needs considered, and the patient is agreeable to the plan of care and goals as stated below:     Anticipated Barriers for therapy: none    Medical Necessity is demonstrated by the following -  History  Co-morbidities and personal factors that may impact the plan of care [x] LOW: no personal factors / co-morbidities  [] MODERATE: 1-2 personal factors / co-morbidities  [] HIGH: 3+ personal factors / co-morbidities    Moderate / High Support Documentation:   Co-morbidities affecting plan of care: -    Personal Factors:   no deficits     Examination  Body Structures and Functions, activity limitations and participation restrictions that may impact the plan of care [x] LOW: addressing 1-2 elements  [] MODERATE: 3+ elements  [] HIGH: 4+ elements (please support below)    Moderate / High Support Documentation: -     Clinical Presentation [x] LOW:  stable  [] MODERATE: Evolving  [] HIGH: Unstable     Decision Making/ Complexity Score: low       Short Term Goals: 6 weeks   Pt to report >50% improvement in urine leakage   Pt to report urinary frequency of no more than every 1.5 hours to demonstrate improved bladder control   Pt to report >50% improvement in constipation and ease of bowel movements to reduce impact on urinary urgency   Pt to be independent with introductory home exercise program     Long Term Goals: 12 weeks   Pt to deny urinary incontinence to demonstrate improved pelvic floor coordination   Pt to report urinary frequency of no more than every 2-3 hours to demonstrate improved bladder control   Pt to report nocturia of no more than 1-2 times per night for improved sleep quality   Pt to report >90% improvement in constipation and ease of bowel movements to reduce impact on urinary urgency   Pt to score at least 75% on the Urinary Problem FOTO survey to demonstrate reduced impairment due to pelvic floor dysfunction   Pt to be independent with advanced home exercise program      PLAN     Plan of Care certification: 12/3/2024 to 3/3/2025    Outpatient Physical Therapy - 1-2 times per week for 12 weeks to include the following interventions: Therapeutic Exercise, Manual Therapy, Therapeutic Activity, Neuromuscular Re-education, Electrical Stimulation Unattended, Self-Care, Patient Education      Cindy Lange, PT, DPT  Board-Certified Clinical Specialist in Women's Health Physical Therapy

## 2024-12-03 NOTE — PROGRESS NOTES
"Cristina Man is a 44 y.o. female  who presents for annual exam.  S/P TLH.  Denies bleeding but describes occasional hot flashes.  She describes having urinary incontinence associated with cough / sneeze as well as urgency symptoms - plans to see Pelvic Floor PT later today.  Otherwise, denies recent changes in her medical / surgical history.  Mammogram scheduled for today.   Patient's last menstrual period was 2015.    Blood pressure (!) 150/100, height 5' 6" (1.676 m), weight 102.1 kg (225 lb 1.4 oz), last menstrual period 2015.    22 Pap: Negative, HPV: Negative    23 Mammogram: Negative, TC: 9.16%     Past Medical History:   Diagnosis Date    Abnormal Pap smear of cervix     Allergic rhinitis due to allergen     Asthma at age 4    Hypertension     Migraines        Past Surgical History:   Procedure Laterality Date    COLONOSCOPY N/A 3/11/2024    Procedure: COLONOSCOPY;  Surgeon: Emely Braden MD;  Location: [address];  Service: Endoscopy;  Laterality: N/A;  inst to email, peg, ref by Madelaine Hayden-MS  3/6-debby [email or web address]-MS    HEMORRHOID SURGERY      HYSTERECTOMY      REDUCTION OF BOTH BREASTS Bilateral 2020    Procedure: MAMMOPLASTY, REDUCTION, BILATERAL;  Surgeon: Marvin Levine MD;  Location: [facility];  Service: Plastics;  Laterality: Bilateral;    TOTAL REDUCTION MAMMOPLASTY         OB History          2    Para   2    Term   2            AB        Living             SAB        IAB        Ectopic        Multiple        Live Births                     ROS:  GENERAL: Feeling well overall.   SKIN: Denies rash or lesions.   HEAD: Denies head injury or headache.   NODES: Denies enlarged lymph nodes.   CHEST: Denies chest pain or shortness of breath.   CARDIOVASCULAR: Denies palpitations or left sided chest pain.   ABDOMEN: Reports some constipation.  URINARY: Reports incontinence- seeing Pelvic Floor PT today.  REPRODUCTIVE: See HPI. "   BREASTS: Denies pain, lumps, or nipple discharge.   HEMATOLOGIC: No easy bruisability or excessive bleeding.   MUSCULOSKELETAL: Denies joint pain or swelling.   NEUROLOGIC: Denies syncope or weakness.   PSYCHIATRIC: Denies depression.    PE:   (chaperone present during entire exam)  APPEARANCE: Well nourished, well developed, in no acute distress.  BREASTS: Symmetrical.  S/P bilateral reduction.  No palpable masses, nipple discharge or adenopathy bilaterally.  ABDOMEN: Soft. No tenderness or masses.   VULVA: No lesions. Normal female genitalia.  URETHRAL MEATUS: Normal size and location, no lesions, no prolapse.  URETHRA: No masses, tenderness, prolapse or scarring.  VAGINA: No lesions, no abnormal discharge, no significant cystocele or rectocele.  CERVIX / UTERUS: Surgically absent  ADNEXA: No masses, tenderness or CDS nodularity.  ANUS PERINEUM: Normal.      Diagnosis:  1. Women's annual routine gynecological examination    2. History of hysterectomy    3. Pap smear for cervical cancer screening    4. Visit for screening mammogram          PLAN:    Orders Placed This Encounter    Liquid-Based Pap Smear, Screening       Patient was counseled today on postmenopausal issues.  She is scheduled to mammogram and pelvic floor PT evaluation today.  If she continues to have urinary issues, we discussed referral to urogynecology.    Mammogram today  Pelvic Floor PT today  Follow-up in 1 year.    This note was created with voice recognition software.  Grammatical, syntax and spelling errors may be inevitable.\

## 2024-12-03 NOTE — PATIENT INSTRUCTIONS
*Try to wait at least 60-75 minutes between voids to help retrain the bladder and reduce sensitivity.    DOUBLE VOIDING - Double voiding is a technique that may assist the bladder to empty more effectively when urine is left in the bladder.  1) Sit down on toilet and urinate  2) Stand up and move the hips a bit to try to funnel more urine to the bottom of the bladder  3) Sit back down and relax to see if any more urine comes out.  *Do not stop your urine stream or push/strain!    Reducing post-void dribble - After you finish urinating and before you stand up, perform a few pelvic floor muscle squeezes to prepare for standing. As you get ready to stand, gently squeeze the pelvic floor and hold it lifted as you transfer to stand from the toilet.    Pelvic floor muscle coordination drills, 2 sets of 10 daily  Gently squeeze the pelvic floor and then FULLY relax. Spend a few breaths focusing on relaxation before moving to the next contraction. Think flaring the pelvic floor like a trumpet or feeling the pelvic floor drop.

## 2024-12-04 ENCOUNTER — PATIENT MESSAGE (OUTPATIENT)
Dept: INTERNAL MEDICINE | Facility: CLINIC | Age: 44
End: 2024-12-04
Payer: COMMERCIAL

## 2024-12-04 ENCOUNTER — TELEPHONE (OUTPATIENT)
Dept: NEUROLOGY | Facility: CLINIC | Age: 44
End: 2024-12-04
Payer: COMMERCIAL

## 2024-12-04 DIAGNOSIS — R11.0 NAUSEA: ICD-10-CM

## 2024-12-04 DIAGNOSIS — E66.812 CLASS 2 SEVERE OBESITY DUE TO EXCESS CALORIES WITH SERIOUS COMORBIDITY AND BODY MASS INDEX (BMI) OF 35.0 TO 35.9 IN ADULT: Primary | ICD-10-CM

## 2024-12-04 DIAGNOSIS — E66.01 CLASS 2 SEVERE OBESITY DUE TO EXCESS CALORIES WITH SERIOUS COMORBIDITY AND BODY MASS INDEX (BMI) OF 35.0 TO 35.9 IN ADULT: Primary | ICD-10-CM

## 2024-12-04 DIAGNOSIS — R73.03 PREDIABETES: ICD-10-CM

## 2024-12-04 LAB
CLINICAL INFO: NORMAL
DATE OF PREVIOUS PAP: NO
DATE PREVIOUS BX: NO
LMP START DATE: NORMAL
SPECIMEN SOURCE CVX/VAG CYTO: NORMAL

## 2024-12-04 NOTE — TELEPHONE ENCOUNTER
Spoke to pt and got her rescheduled per providers request due to technical issues from providers end.

## 2024-12-04 NOTE — TELEPHONE ENCOUNTER
Attempted to call pt to reschedule 10:20 visit with Dr. Bradley per providers request due to a technical issue on the providers end. No answer, could not leave voicemail

## 2024-12-05 ENCOUNTER — OFFICE VISIT (OUTPATIENT)
Dept: NEUROLOGY | Facility: CLINIC | Age: 44
End: 2024-12-05
Payer: COMMERCIAL

## 2024-12-05 DIAGNOSIS — G44.40 MEDICATION OVERUSE HEADACHE: ICD-10-CM

## 2024-12-05 DIAGNOSIS — G43.E09 CHRONIC MIGRAINE WITH AURA WITHOUT STATUS MIGRAINOSUS, NOT INTRACTABLE: Primary | ICD-10-CM

## 2024-12-05 PROCEDURE — 3044F HG A1C LEVEL LT 7.0%: CPT | Mod: CPTII,95,, | Performed by: PSYCHIATRY & NEUROLOGY

## 2024-12-05 PROCEDURE — 99203 OFFICE O/P NEW LOW 30 MIN: CPT | Mod: 95,,, | Performed by: PSYCHIATRY & NEUROLOGY

## 2024-12-05 PROCEDURE — 1159F MED LIST DOCD IN RCRD: CPT | Mod: CPTII,95,, | Performed by: PSYCHIATRY & NEUROLOGY

## 2024-12-05 RX ORDER — RIZATRIPTAN BENZOATE 10 MG/1
10 TABLET ORAL
Qty: 9 TABLET | Refills: 5 | Status: SHIPPED | OUTPATIENT
Start: 2024-12-05

## 2024-12-05 RX ORDER — ONDANSETRON 4 MG/1
4 TABLET, ORALLY DISINTEGRATING ORAL EVERY 8 HOURS PRN
Qty: 30 TABLET | Refills: 0 | Status: SHIPPED | OUTPATIENT
Start: 2024-12-05

## 2024-12-05 RX ORDER — TOPIRAMATE 25 MG/1
25 TABLET ORAL 2 TIMES DAILY
Qty: 60 TABLET | Refills: 11 | Status: SHIPPED | OUTPATIENT
Start: 2024-12-05 | End: 2025-12-05

## 2024-12-05 NOTE — PROGRESS NOTES
Neurology Telemedicine Note     Name: Cristina Man  MRN: [id number]   CSN: [id number]      Date: 12/05/2024    The patient location is: Home  The chief complaint leading to consultation is: headache  Visit type: Virtual visit with synchronous audio and video    TOTAL TIME SPENT: 35 mins    Each patient to whom I provide medical services by telemedicine is:  (1) informed of the relationship between the physician and patient and the respective role of any other health care provider with respect to management of the patient; and (2) notified that they may decline to receive medical services by telemedicine and may withdraw from such care at any time.    History of Present Illness (HPI):  Patient was a 44 old female migraines to tele Neurology Clinic due to migraines.  Patient states that gets about headaches per week.  She states that headaches are usually unilateral and throbbing associated with light sensitivity and nausea.  Patient states that she has been taking Goody powder a near daily basis for years.  Endorses low-lying headaches have gotten better.  She does have high blood pressure and asthma patient states she was on Topamax in the past which helped.  She had a recent CT scan in October which was negative may have some sinus issues.  Patient denies any new worsening symptoms.  Patient denies any brainstem symptoms does auras such I pressure and head pressure prior to migraines.  Patient does not report any positional component to headache nor any worsening with Valsalva.  No vision issues reported    Nonmotor/Premotor ROS: as per HPI, and all other systems are negative    Past Medical History: The patient  has a past medical history of Abnormal Pap smear of cervix, Allergic rhinitis due to allergen, Asthma (at age 4), Hypertension, and Migraines.    Social History: The patient  reports that she has never smoked. She has never used smokeless tobacco. She reports current alcohol use. She reports that she does not  use drugs.    Family History: Their family history includes Cirrhosis in her father; Diabetes in her maternal grandfather, maternal grandmother, and paternal grandmother; Heart failure in her maternal grandmother; Hypertension in her brother, maternal grandfather, maternal grandmother, mother, and paternal grandmother; Lung cancer in her paternal grandmother.    Allergies: Patient has no known allergies.     Meds:   Current Outpatient Medications on File Prior to Visit   Medication Sig Dispense Refill    albuterol (ACCUNEB) 0.63 mg/3 mL Nebu USE 3 ML IN NEBULIZER EVERY 6 HOURS AS NEEDED FOR WHEEZING 225 mL 3    albuterol (PROVENTIL/VENTOLIN HFA) 90 mcg/actuation inhaler Inhale 2 puffs into the lungs every 6 (six) hours as needed for Wheezing. 54 g 2    amLODIPine (NORVASC) 5 MG tablet Take 1 tablet (5 mg total) by mouth once daily. 90 tablet 3    carbamide peroxide (DEBROX) 6.5 % otic solution Place 5 drops into the left ear 2 (two) times daily. for 14 days 15 mL 0    ergocalciferol (ERGOCALCIFEROL) 50,000 unit Cap Take 1 capsule (50,000 Units total) by mouth every 7 days. 12 capsule 3    estradioL (ESTRACE) 0.01 % (0.1 mg/gram) vaginal cream Place 0.5 g vaginally twice a week. 42.5 g 11    fluticasone propionate (FLONASE) 50 mcg/actuation nasal spray 1 spray (50 mcg total) by Each Nostril route once daily. 16 g 3    fluticasone-salmeterol diskus inhaler 500-50 mcg Inhale 1 puff into the lungs 2 (two) times daily. Controller 60 each 11    ibuprofen (ADVIL,MOTRIN) 600 MG tablet Take 1 tablet (600 mg total) by mouth every 8 (eight) hours as needed for Pain. 30 tablet 0    ketorolac 0.4% (ACULAR) 0.4 % Drop Place into both eyes.      LIDOcaine-prilocaine (EMLA) cream Apply topically 2 (two) times daily as needed. 60 g 5    nebulizer and compressor Gely Use as directed 1 each 0    neomycin-polymyxin-dexamethasone (MAXITROL) 3.5mg/mL-10,000 unit/mL-0.1 % DrpS Place into both eyes.      ondansetron (ZOFRAN-ODT) 4 MG TbDL  Take 1 tablet (4 mg total) by mouth every 6 (six) hours as needed. 12 tablet 0    phenazopyridine (PYRIDIUM) 200 MG tablet Take 1 tablet (200 mg total) by mouth 3 (three) times daily as needed for Pain. 10 tablet 1    predniSONE (DELTASONE) 20 MG tablet Take 2 tablets (40 mg total) by mouth once daily. for 5 days 10 tablet 0    promethazine-dextromethorphan (PROMETHAZINE-DM) 6.25-15 mg/5 mL Syrp Take 5 mLs by mouth every 8 (eight) hours as needed (cough). 180 mL 0    semaglutide (OZEMPIC) 0.25 mg or 0.5 mg (2 mg/3 mL) pen injector Inject 0.25 mg into the skin every 7 days. 3 mL 3    sulfamethoxazole-trimethoprim 800-160mg (BACTRIM DS) 800-160 mg Tab Take 1 tablet by mouth 2 (two) times daily. for 10 days 20 tablet 0    tiZANidine (ZANAFLEX) 4 MG tablet TAKE 1 TABLET(4 MG) BY MOUTH EVERY NIGHT AS NEEDED 20 tablet 0    vibegron (GEMTESA) 75 mg Tab Take 1 tablet (75 mg total) by mouth once daily. 30 tablet 11    azelastine (ASTELIN) 137 mcg (0.1 %) nasal spray 1 spray (137 mcg total) by Nasal route 2 (two) times daily. 30 mL 3    levocetirizine (XYZAL) 5 MG tablet Take 1 tablet (5 mg total) by mouth every evening. 90 tablet 3    solifenacin (VESICARE) 10 MG tablet Take 1 tablet (10 mg total) by mouth once daily. 30 tablet 11     Current Facility-Administered Medications on File Prior to Visit   Medication Dose Route Frequency Provider Last Rate Last Admin    EPINEPHrine 1,000 mcg in lactated Ringers 1,000 mL irrigation   Irrigation On Call Procedure Marvin Levine MD        EPINEPHrine 1,000 mcg in lactated Ringers 1,000 mL irrigation   Irrigation On Call Procedure Marvin Levine MD           Exam:  Vital Signs deferred with home visit    Constitutional  Well-developed, well-nourished, appears stated age   Eyes  No scleral icterus   ENT  Moist oral mucosa   Cardiovascular  No lower extremity edema    Respiratory  No labored breathing    Skin  No rashes   Hematologic  No bruising   Psychiatric  Normal mood and  affect   Other  GI/ deferred    Neurological     * Mental status  Alert and oriented to person, place, time, and situation; no dysarthria; no aphasia; normal recent and remote memory; follows commands   * Cranial nerves     - CN II  Pupils midposition and symmetric   - CN III, IV, VI  Extraocular movements full, no nystagmus visualized   - CN V  Unable to test   - CN VII  Face strong and symmetric bilaterally    - CN VIII  Hearing grossly intact with conversation    - CN IX, X  Palate raises midline and symmetric    - CN XI  Strong shoulder shrug B    - CN XII  Tongue appears midline    * Motor  Normal bulk by appearance, no drift    * Sensory  Not tested objectively, no changes described by the patient   * Deep tendon reflexes  Not tested   * Coord/Movemt/Gait No hypophonic speech.  No facial masking.  No B bradykinesia.  No tremor with rest, posture, kinesis, or intention.   No chorea, athetosis, dystonia, myoclonus, or tics.   No motor impersistence.  Gait is deferred for safety.       Medical Record Review:  - Lab Results:  Office Visit on 12/03/2024   Component Date Value Ref Range Status    Cytology ThinPrep Pap Source 12/03/2024 Cervix   Final    Cytology Thinprep PAP Clinical His* 12/03/2024 Routine   Final    Cytology ThinPrep Pap LMP 12/03/2024 04/27/2015   Final    Cytology ThinPrep Previous PAP 12/03/2024 No   Final    Cytology ThinPrep Previous Biopsy 12/03/2024 No   Final   Lab Visit on 12/03/2024   Component Date Value Ref Range Status    WBC 12/03/2024 6.80  3.90 - 12.70 K/uL Final    RBC 12/03/2024 4.40  4.00 - 5.40 M/uL Final    Hemoglobin 12/03/2024 13.0  12.0 - 16.0 g/dL Final    Hematocrit 12/03/2024 40.7  37.0 - 48.5 % Final    MCV 12/03/2024 93  82 - 98 fL Final    MCH 12/03/2024 29.5  27.0 - 31.0 pg Final    MCHC 12/03/2024 31.9 (L)  32.0 - 36.0 g/dL Final    RDW 12/03/2024 12.4  11.5 - 14.5 % Final    Platelets 12/03/2024 293  150 - 450 K/uL Final    MPV 12/03/2024 9.0 (L)  9.2 - 12.9 fL  Final    Immature Granulocytes 12/03/2024 0.3  0.0 - 0.5 % Final    Gran # (ANC) 12/03/2024 4.7  1.8 - 7.7 K/uL Final    Immature Grans (Abs) 12/03/2024 0.02  0.00 - 0.04 K/uL Final    Lymph # 12/03/2024 1.1  1.0 - 4.8 K/uL Final    Mono # 12/03/2024 0.8  0.3 - 1.0 K/uL Final    Eos # 12/03/2024 0.1  0.0 - 0.5 K/uL Final    Baso # 12/03/2024 0.03  0.00 - 0.20 K/uL Final    nRBC 12/03/2024 0  0 /100 WBC Final    Gran % 12/03/2024 69.7  38.0 - 73.0 % Final    Lymph % 12/03/2024 16.6 (L)  18.0 - 48.0 % Final    Mono % 12/03/2024 11.2  4.0 - 15.0 % Final    Eosinophil % 12/03/2024 1.8  0.0 - 8.0 % Final    Basophil % 12/03/2024 0.4  0.0 - 1.9 % Final    Differential Method 12/03/2024 Automated   Final    Sodium 12/03/2024 140  136 - 145 mmol/L Final    Potassium 12/03/2024 3.9  3.5 - 5.1 mmol/L Final    Chloride 12/03/2024 107  95 - 110 mmol/L Final    CO2 12/03/2024 25  23 - 29 mmol/L Final    Glucose 12/03/2024 109  70 - 110 mg/dL Final    BUN 12/03/2024 12  6 - 20 mg/dL Final    Creatinine 12/03/2024 0.8  0.5 - 1.4 mg/dL Final    Calcium 12/03/2024 9.1  8.7 - 10.5 mg/dL Final    Total Protein 12/03/2024 7.5  6.0 - 8.4 g/dL Final    Albumin 12/03/2024 3.9  3.5 - 5.2 g/dL Final    Total Bilirubin 12/03/2024 0.2  0.1 - 1.0 mg/dL Final    Alkaline Phosphatase 12/03/2024 111  40 - 150 U/L Final    AST 12/03/2024 12  10 - 40 U/L Final    ALT 12/03/2024 14  10 - 44 U/L Final    eGFR 12/03/2024 >60  >60 mL/min/1.73 m^2 Final    Anion Gap 12/03/2024 8  8 - 16 mmol/L Final    Hemoglobin A1C 12/03/2024 5.9 (H)  4.0 - 5.6 % Final    Estimated Avg Glucose 12/03/2024 123  68 - 131 mg/dL Final    Cholesterol 12/03/2024 208 (H)  120 - 199 mg/dL Final    Triglycerides 12/03/2024 62  30 - 150 mg/dL Final    HDL 12/03/2024 62  40 - 75 mg/dL Final    LDL Cholesterol 12/03/2024 133.6  63.0 - 159.0 mg/dL Final    HDL/Cholesterol Ratio 12/03/2024 29.8  20.0 - 50.0 % Final    Total Cholesterol/HDL Ratio 12/03/2024 3.4  2.0 - 5.0 Final     Non-HDL Cholesterol 2024 146  mg/dL Final    TSH 2024 1.083  0.400 - 4.000 uIU/mL Final    Vit D, 25-Hydroxy 2024 16 (L)  30 - 96 ng/mL Final   Lab Visit on 2024   Component Date Value Ref Range Status    IgG 2024 1512  650 - 1600 mg/dL Final    IgA 2024 303  40 - 350 mg/dL Final    IgM 2024 153  50 - 300 mg/dL Final    Total IgE 2024 435 (H)  0 - 100 IU/mL Final   Office Visit on 2024   Component Date Value Ref Range Status    Aerobic Bacterial Culture 2024  (A)   Final                    Value:CITROBACTER KOSERI  Rare  Skin santiago also present      Anaerobic Culture 2024 No anaerobes isolated   Final   Admission on 10/05/2024, Discharged on 10/06/2024   Component Date Value Ref Range Status    HIV 1/2 Ag/Ab 10/05/2024 Non-reactive  Non-reactive Final    Hepatitis C Ab 10/05/2024 Non-reactive  Non-reactive Final    SARS-CoV-2 RNA, Amplification, Qual 10/05/2024 Negative  Negative Final   Office Visit on 2024   Component Date Value Ref Range Status    Urine Culture, Routine 2024 Multiple organisms isolated. None in predominance.  Repeat if   Final    Urine Culture, Routine 2024 clinically necessary.   Final       Diagnoses:   1) Chronic migraines  2) Medication overuse headaches    Medical Decision Makin) Stop goodys powders- due to HTN and medication overuse  2) Start topamax 25 mg qhs x 2 weeks then 25 mg bid  3) mag ox and b2  4) Maxalt prn for migraines, can also try tylenol- stay away from NSAIDS  5) migraine diary    - Migraine counselling:  - Patient was instructed to take the abortive medications once the headache starts.  - Patient was advised to stay in a calm quite place during headache.  - Discussed the need to avoid common migraine triggers (artificial sweeteners, food preservative (MSG), aged cheese, skipping meals, change in wake/sleep cycle and intense physical exertion)  - Lifestyle changes that help migraine:  physical exercise, fixed meal time, fixed sleep/wake cycle, avoid smoking and highly caffeinated products.  - Patient was advised to avoid OTC analgesics which can increase migraine frequency and can cause medication overuse headache.         I spent 35 minutes with the patient with >50% of the time spent with counseling and education regarding:    This is a consult performed through audio-visual using [employer]. Pt and provider are in different locations. History and physical exam are limited due to the nature of this encounter.       Felicitas Bradley MD

## 2024-12-09 ENCOUNTER — PATIENT MESSAGE (OUTPATIENT)
Dept: OBSTETRICS AND GYNECOLOGY | Facility: CLINIC | Age: 44
End: 2024-12-09
Payer: COMMERCIAL

## 2024-12-13 ENCOUNTER — PATIENT MESSAGE (OUTPATIENT)
Dept: REHABILITATION | Facility: OTHER | Age: 44
End: 2024-12-13

## 2025-03-05 ENCOUNTER — PATIENT MESSAGE (OUTPATIENT)
Dept: NEUROLOGY | Facility: CLINIC | Age: 45
End: 2025-03-05
Payer: COMMERCIAL

## 2025-04-01 ENCOUNTER — OFFICE VISIT (OUTPATIENT)
Dept: INTERNAL MEDICINE | Facility: CLINIC | Age: 45
End: 2025-04-01
Payer: COMMERCIAL

## 2025-04-01 ENCOUNTER — LAB VISIT (OUTPATIENT)
Dept: LAB | Facility: HOSPITAL | Age: 45
End: 2025-04-01
Attending: INTERNAL MEDICINE
Payer: COMMERCIAL

## 2025-04-01 VITALS
HEART RATE: 94 BPM | WEIGHT: 228.19 LBS | DIASTOLIC BLOOD PRESSURE: 80 MMHG | HEIGHT: 66 IN | OXYGEN SATURATION: 99 % | BODY MASS INDEX: 36.67 KG/M2 | SYSTOLIC BLOOD PRESSURE: 136 MMHG

## 2025-04-01 DIAGNOSIS — N39.46 MIXED URGE AND STRESS INCONTINENCE: ICD-10-CM

## 2025-04-01 DIAGNOSIS — I10 BENIGN ESSENTIAL HYPERTENSION: ICD-10-CM

## 2025-04-01 DIAGNOSIS — E55.9 VITAMIN D DEFICIENCY: ICD-10-CM

## 2025-04-01 DIAGNOSIS — R53.83 FATIGUE, UNSPECIFIED TYPE: ICD-10-CM

## 2025-04-01 DIAGNOSIS — E78.2 MIXED HYPERLIPIDEMIA: ICD-10-CM

## 2025-04-01 DIAGNOSIS — E66.01 CLASS 2 SEVERE OBESITY DUE TO EXCESS CALORIES WITH SERIOUS COMORBIDITY AND BODY MASS INDEX (BMI) OF 36.0 TO 36.9 IN ADULT: ICD-10-CM

## 2025-04-01 DIAGNOSIS — J45.41 MODERATE PERSISTENT ASTHMA WITH ACUTE EXACERBATION: Primary | ICD-10-CM

## 2025-04-01 DIAGNOSIS — E66.812 CLASS 2 SEVERE OBESITY DUE TO EXCESS CALORIES WITH SERIOUS COMORBIDITY AND BODY MASS INDEX (BMI) OF 36.0 TO 36.9 IN ADULT: ICD-10-CM

## 2025-04-01 DIAGNOSIS — R73.03 PREDIABETES: ICD-10-CM

## 2025-04-01 LAB
25(OH)D3+25(OH)D2 SERPL-MCNC: 22 NG/ML (ref 30–96)
ALBUMIN SERPL BCP-MCNC: 3.7 G/DL (ref 3.5–5.2)
ALP SERPL-CCNC: 93 UNIT/L (ref 40–150)
ALT SERPL W/O P-5'-P-CCNC: 12 UNIT/L (ref 10–44)
ANION GAP (OHS): 7 MMOL/L (ref 8–16)
AST SERPL-CCNC: 13 UNIT/L (ref 11–45)
BILIRUB SERPL-MCNC: 0.3 MG/DL (ref 0.1–1)
BUN SERPL-MCNC: 9 MG/DL (ref 6–20)
CALCIUM SERPL-MCNC: 8.8 MG/DL (ref 8.7–10.5)
CHLORIDE SERPL-SCNC: 109 MMOL/L (ref 95–110)
CO2 SERPL-SCNC: 24 MMOL/L (ref 23–29)
CREAT SERPL-MCNC: 0.8 MG/DL (ref 0.5–1.4)
EAG (OHS): 123 MG/DL (ref 68–131)
GFR SERPLBLD CREATININE-BSD FMLA CKD-EPI: >60 ML/MIN/1.73/M2
GLUCOSE SERPL-MCNC: 105 MG/DL (ref 70–110)
HBA1C MFR BLD: 5.9 % (ref 4–5.6)
POTASSIUM SERPL-SCNC: 3.7 MMOL/L (ref 3.5–5.1)
PROT SERPL-MCNC: 7.5 GM/DL (ref 6–8.4)
SODIUM SERPL-SCNC: 140 MMOL/L (ref 136–145)
VIT B12 SERPL-MCNC: 590 PG/ML (ref 210–950)

## 2025-04-01 PROCEDURE — 3079F DIAST BP 80-89 MM HG: CPT | Mod: CPTII,S$GLB,, | Performed by: INTERNAL MEDICINE

## 2025-04-01 PROCEDURE — 99999 PR PBB SHADOW E&M-EST. PATIENT-LVL V: CPT | Mod: PBBFAC,,, | Performed by: INTERNAL MEDICINE

## 2025-04-01 PROCEDURE — 3075F SYST BP GE 130 - 139MM HG: CPT | Mod: CPTII,S$GLB,, | Performed by: INTERNAL MEDICINE

## 2025-04-01 PROCEDURE — 83036 HEMOGLOBIN GLYCOSYLATED A1C: CPT

## 2025-04-01 PROCEDURE — 3008F BODY MASS INDEX DOCD: CPT | Mod: CPTII,S$GLB,, | Performed by: INTERNAL MEDICINE

## 2025-04-01 PROCEDURE — 36415 COLL VENOUS BLD VENIPUNCTURE: CPT

## 2025-04-01 PROCEDURE — 99214 OFFICE O/P EST MOD 30 MIN: CPT | Mod: S$GLB,,, | Performed by: INTERNAL MEDICINE

## 2025-04-01 PROCEDURE — 80053 COMPREHEN METABOLIC PANEL: CPT

## 2025-04-01 PROCEDURE — 82306 VITAMIN D 25 HYDROXY: CPT

## 2025-04-01 PROCEDURE — 1159F MED LIST DOCD IN RCRD: CPT | Mod: CPTII,S$GLB,, | Performed by: INTERNAL MEDICINE

## 2025-04-01 PROCEDURE — 82607 VITAMIN B-12: CPT

## 2025-04-01 PROCEDURE — 1160F RVW MEDS BY RX/DR IN RCRD: CPT | Mod: CPTII,S$GLB,, | Performed by: INTERNAL MEDICINE

## 2025-04-01 RX ORDER — DOXYCYCLINE HYCLATE 100 MG
100 TABLET ORAL 2 TIMES DAILY
Qty: 14 TABLET | Refills: 0 | Status: SHIPPED | OUTPATIENT
Start: 2025-04-01 | End: 2025-04-08

## 2025-04-01 RX ORDER — FLUTICASONE PROPIONATE AND SALMETEROL 500; 50 UG/1; UG/1
1 POWDER RESPIRATORY (INHALATION) 2 TIMES DAILY
Qty: 60 EACH | Refills: 11 | Status: SHIPPED | OUTPATIENT
Start: 2025-04-01 | End: 2026-04-01

## 2025-04-01 RX ORDER — PREDNISONE 50 MG/1
50 TABLET ORAL DAILY
Qty: 5 TABLET | Refills: 0 | Status: SHIPPED | OUTPATIENT
Start: 2025-04-01 | End: 2025-04-06

## 2025-04-01 NOTE — PROGRESS NOTES
INTERNAL MEDICINE ESTABLISHED PATIENT VISIT NOTE    Subjective:     Chief Complaint: Follow-up  HTN, preDM     Patient ID: Cristina Man is a 45 y.o. female with HTN, preDM, mild intermittent asthma with allergic rhinitis, history of migraines, history of anemia resolved, obesity , last seen by me in Dec for her annual exam, here today for HTN, preDM.    Was rx'ed Semaglutide in Dec for weight but states she only took it for a month and can't afford to keep up with it.    Seen by Dr. Bradley in Dec for migraines and told to stop Goody's and was rx'ed Topamax which she states helped.    Is having persistent issues c urinary freq and urgency.  No dysuria.  Seen by urogyn for this and rx'ed Vesicare and Gemtasa in the past but states it wasn't covered by her insurance so plans to discuss other options c them.    Feels like her asthma sx are not well controlled.  States she is not taking Advair, only Albuterol but taking it daily.  Wheezing daily.    Past Medical History:  Past Medical History:   Diagnosis Date    Abnormal Pap smear of cervix     Allergic rhinitis due to allergen     Asthma at age 4    Hypertension     Migraines        Home Medications:  Prior to Admission medications    Medication Sig Start Date End Date Taking? Authorizing Provider   albuterol (ACCUNEB) 0.63 mg/3 mL Nebu USE 3 ML IN NEBULIZER EVERY 6 HOURS AS NEEDED FOR WHEEZING 1/17/24   Madelaine Hayden MD   albuterol (PROVENTIL/VENTOLIN HFA) 90 mcg/actuation inhaler Inhale 2 puffs into the lungs every 6 (six) hours as needed for Wheezing. 12/2/24   Madelaine Hayden MD   amLODIPine (NORVASC) 5 MG tablet Take 1 tablet (5 mg total) by mouth once daily. 12/2/24 12/2/25  Madelaine Hayden MD   azelastine (ASTELIN) 137 mcg (0.1 %) nasal spray 1 spray (137 mcg total) by Nasal route 2 (two) times daily. 5/10/23 5/9/24  Madelaine Hayden MD   ergocalciferol (ERGOCALCIFEROL) 50,000 unit Cap Take 1 capsule (50,000 Units total) by mouth every 7 days. 12/3/24   Madelaine Hayden MD    estradioL (ESTRACE) 0.01 % (0.1 mg/gram) vaginal cream Place 0.5 g vaginally twice a week. 9/26/24   Max Cagle PA-C   fluticasone propionate (FLONASE) 50 mcg/actuation nasal spray 1 spray (50 mcg total) by Each Nostril route once daily. 5/10/23   Madelaine Hayden MD   fluticasone-salmeterol diskus inhaler 500-50 mcg Inhale 1 puff into the lungs 2 (two) times daily. Controller 12/2/24 12/2/25  Madelaine Hayden MD   ibuprofen (ADVIL,MOTRIN) 600 MG tablet Take 1 tablet (600 mg total) by mouth every 8 (eight) hours as needed for Pain. 1/2/24   Madelaine Hayden MD   ketorolac 0.4% (ACULAR) 0.4 % Drop Place into both eyes. 4/3/23   Provider, Historical   levocetirizine (XYZAL) 5 MG tablet Take 1 tablet (5 mg total) by mouth every evening. 5/10/23 5/9/24  Madelaine Hayden MD   LIDOcaine-prilocaine (EMLA) cream Apply topically 2 (two) times daily as needed. 9/6/23   Tova Kitchen PA-C   nebulizer and compressor Gely Use as directed 11/21/23      neomycin-polymyxin-dexamethasone (MAXITROL) 3.5mg/mL-10,000 unit/mL-0.1 % DrpS Place into both eyes. 4/3/23   Provider, Historical   ondansetron (ZOFRAN-ODT) 4 MG TbDL Take 1 tablet (4 mg total) by mouth every 8 (eight) hours as needed (nausea). 12/5/24   Madelaine Hayden MD   phenazopyridine (PYRIDIUM) 200 MG tablet Take 1 tablet (200 mg total) by mouth 3 (three) times daily as needed for Pain. 9/25/24   Max Cagle PA-C   promethazine-dextromethorphan (PROMETHAZINE-DM) 6.25-15 mg/5 mL Syrp Take 5 mLs by mouth every 8 (eight) hours as needed (cough). 12/30/23   Nannette Nichols NP   rizatriptan (MAXALT) 10 MG tablet Take 1 tablet (10 mg total) by mouth every 2 (two) hours as needed for Migraine (migraine onset- no more than 2 doses in a 24 hr period). 12/5/24   Felicitas Bradley MD   semaglutide, weight loss, 0.25 mg/0.5 mL PnIj Inject 0.25 mg into the skin every 7 days. 12/5/24   Madelaine Hayden MD   solifenacin (VESICARE) 10 MG tablet Take 1 tablet (10 mg total) by mouth once  "daily. 10/31/22 12/30/23  Ernestine Chatman NP   tiZANidine (ZANAFLEX) 4 MG tablet TAKE 1 TABLET(4 MG) BY MOUTH EVERY NIGHT AS NEEDED 8/5/22   Saadia Strong PA-C   topiramate (TOPAMAX) 25 MG tablet Take 1 tablet (25 mg total) by mouth 2 (two) times daily. 12/5/24 12/5/25  Felicitas Bradley MD   vibegron (GEMTESA) 75 mg Tab Take 1 tablet (75 mg total) by mouth once daily. 9/25/24   Max Cagle PA-C       Allergies:  Review of patient's allergies indicates:  No Known Allergies    Social History:  Social History[1]     Review of Systems   Constitutional:  Positive for fatigue. Negative for chills and fever.   Respiratory:  Positive for shortness of breath and wheezing. Negative for cough and chest tightness.    Cardiovascular:  Negative for chest pain.   Gastrointestinal:  Negative for abdominal pain and blood in stool.   Genitourinary:  Positive for frequency and urgency. Negative for dysuria.         Health Maintenance:     Immunizations:   Influenza 12/2024  TDap 5/2023  Pneumovax 12/2019 based on dx of asthma, Prevnar 20 5/2023  Shingrix rec at 49  COVID vacc 3/2021, 4/2021, 1/2022, 12/2024  RSV rec at 59     Cancer Screening:  PAP: hx hyst 2/2 irregular bleeding, pap done by Dr. Whaley 12/2024 NILM  Mammogram:  12/2024 benign  Colonoscopy:  3/2024 mult polyps removed, adenomatous on path, repeat in 3 yrs per report.      Objective:   /80   Pulse 94   Ht 5' 6" (1.676 m)   Wt 103.5 kg (228 lb 2.8 oz)   LMP 04/27/2015   SpO2 99%   BMI 36.83 kg/m²        General: AAO x3, no apparent distress  HEENT: no cervical LAD, no thyroid masses appreciated  CV: RRR, no m/r/g  Pulm: exp wheezing B.  Abd: s/NT/ND +BS  Extremities: no c/c/e    Labs:     Lab Results   Component Value Date    WBC 6.80 12/03/2024    HGB 13.0 12/03/2024    HCT 40.7 12/03/2024    MCV 93 12/03/2024     12/03/2024     Sodium   Date Value Ref Range Status   12/03/2024 140 136 - 145 mmol/L Final     Potassium "   Date Value Ref Range Status   12/03/2024 3.9 3.5 - 5.1 mmol/L Final     Chloride   Date Value Ref Range Status   12/03/2024 107 95 - 110 mmol/L Final     CO2   Date Value Ref Range Status   12/03/2024 25 23 - 29 mmol/L Final     Glucose   Date Value Ref Range Status   12/03/2024 109 70 - 110 mg/dL Final     BUN   Date Value Ref Range Status   12/03/2024 12 6 - 20 mg/dL Final     Creatinine   Date Value Ref Range Status   12/03/2024 0.8 0.5 - 1.4 mg/dL Final     Calcium   Date Value Ref Range Status   12/03/2024 9.1 8.7 - 10.5 mg/dL Final     Total Protein   Date Value Ref Range Status   12/03/2024 7.5 6.0 - 8.4 g/dL Final     Albumin   Date Value Ref Range Status   12/03/2024 3.9 3.5 - 5.2 g/dL Final     Total Bilirubin   Date Value Ref Range Status   12/03/2024 0.2 0.1 - 1.0 mg/dL Final     Comment:     For infants and newborns, interpretation of results should be based  on gestational age, weight and in agreement with clinical  observations.    Premature Infant recommended reference ranges:  Up to 24 hours.............<8.0 mg/dL  Up to 48 hours............<12.0 mg/dL  3-5 days..................<15.0 mg/dL  6-29 days.................<15.0 mg/dL       Alkaline Phosphatase   Date Value Ref Range Status   12/03/2024 111 40 - 150 U/L Final     AST   Date Value Ref Range Status   12/03/2024 12 10 - 40 U/L Final     ALT   Date Value Ref Range Status   12/03/2024 14 10 - 44 U/L Final     Anion Gap   Date Value Ref Range Status   12/03/2024 8 8 - 16 mmol/L Final     eGFR if    Date Value Ref Range Status   12/22/2021 >60.0 >60 mL/min/1.73 m^2 Final     eGFR if non    Date Value Ref Range Status   12/22/2021 >60.0 >60 mL/min/1.73 m^2 Final     Comment:     Calculation used to obtain the estimated glomerular filtration  rate (eGFR) is the CKD-EPI equation.        Lab Results   Component Value Date    HGBA1C 5.9 (H) 12/03/2024     Lab Results   Component Value Date    LDLCALC 133.6 12/03/2024      Lab Results   Component Value Date    TSH 1.083 12/03/2024         Assessment/Plan     Cristina was seen today for follow-up.    Diagnoses and all orders for this visit:    Moderate persistent asthma with acute exacerbation  Again emphasized need for compliance c controller med and advised to take her Advair BID as scheduled.  Previously discussed and rx'ed Trelegy but pt stated she wanted to stay on Advair.  Wheezing on exam today, will tx c Doxy and prednisone.  Will repeat PFTs in 2 mos and rec f/u c pulm.  -     fluticasone-salmeterol diskus inhaler 500-50 mcg; Inhale 1 puff into the lungs 2 (two) times daily. Controller  -     Ambulatory referral/consult to Pulmonology; Future  -     Complete PFT w/ bronchodilator; Future  -     predniSONE (DELTASONE) 50 MG Tab; Take 1 tablet (50 mg total) by mouth once daily. for 5 days  -     doxycycline (VIBRA-TABS) 100 MG tablet; Take 1 tablet (100 mg total) by mouth 2 (two) times daily. for 7 days    Benign essential hypertension  At goal on Amlodipine  Cont meds  -     Comprehensive Metabolic Panel; Future    Prediabetes  Lab Results   Component Value Date    HGBA1C 5.9 (H) 12/03/2024     Stable on last check  -     Comprehensive Metabolic Panel; Future  -     Hemoglobin A1C; Future    Mixed hyperlipidemia  Lab Results   Component Value Date    LDLCALC 133.6 12/03/2024     Diet controlled  Rec low fat diet and wt loss    Mixed urge and stress incontinence  As per HPI  Issues c cost of rx'ed meds from urogyn  Plans to contact urogyn to schedule f/u to reassess.    Vitamin D deficiency  On weekly ergocalciferol  Will repeat labs and adjust dose if needed.  -     Vitamin D; Future    Fatigue, unspecified type  Mult factors could be contributing.  Repeat Vit D, add B12, consider sleep study pending results.  -     Vitamin B12; Future    Class 2 severe obesity due to excess calories with serious comorbidity and body mass index (BMI) of 36.0 to 36.9 in adult  Counseled  extensively on need for diet changes and daily exercise.  Discussed examples of healthy eating.  Recommend at least 30 minutes of exercise at least 5 days a week.          HM as above  RTC in 6 mos, sooner if needed. Non fasting labs today.    Madelaine Hayden MD  Department of Internal Medicine - [facility]  04/01/2025         [1]   Social History  Tobacco Use    Smoking status: Never    Smokeless tobacco: Never   Substance Use Topics    Alcohol use: Yes     Comment: occ    Drug use: No

## 2025-04-14 ENCOUNTER — RESULTS FOLLOW-UP (OUTPATIENT)
Dept: INTERNAL MEDICINE | Facility: CLINIC | Age: 45
End: 2025-04-14

## 2025-04-14 DIAGNOSIS — E55.9 VITAMIN D DEFICIENCY: ICD-10-CM

## 2025-04-14 RX ORDER — ERGOCALCIFEROL 1.25 MG/1
50000 CAPSULE ORAL
Qty: 24 CAPSULE | Refills: 3 | Status: SHIPPED | OUTPATIENT
Start: 2025-04-14

## 2025-04-30 ENCOUNTER — HOSPITAL ENCOUNTER (OUTPATIENT)
Dept: PULMONOLOGY | Facility: CLINIC | Age: 45
Discharge: HOME | End: 2025-04-30
Payer: COMMERCIAL

## 2025-04-30 ENCOUNTER — OFFICE VISIT (OUTPATIENT)
Dept: PULMONOLOGY | Facility: CLINIC | Age: 45
End: 2025-04-30
Payer: COMMERCIAL

## 2025-04-30 ENCOUNTER — HOSPITAL ENCOUNTER (OUTPATIENT)
Dept: RADIOLOGY | Facility: HOSPITAL | Age: 45
Discharge: HOME | End: 2025-04-30
Attending: INTERNAL MEDICINE
Payer: COMMERCIAL

## 2025-04-30 VITALS
OXYGEN SATURATION: 97 % | HEIGHT: 66 IN | DIASTOLIC BLOOD PRESSURE: 80 MMHG | SYSTOLIC BLOOD PRESSURE: 119 MMHG | BODY MASS INDEX: 36.83 KG/M2 | HEART RATE: 69 BPM

## 2025-04-30 DIAGNOSIS — E66.01 CLASS 2 SEVERE OBESITY DUE TO EXCESS CALORIES WITH SERIOUS COMORBIDITY AND BODY MASS INDEX (BMI) OF 36.0 TO 36.9 IN ADULT: ICD-10-CM

## 2025-04-30 DIAGNOSIS — J45.41 MODERATE PERSISTENT ASTHMA WITH ACUTE EXACERBATION: ICD-10-CM

## 2025-04-30 DIAGNOSIS — J45.50 SEVERE PERSISTENT ASTHMA WITHOUT COMPLICATION: Primary | ICD-10-CM

## 2025-04-30 DIAGNOSIS — R06.00 DYSPNEA, UNSPECIFIED TYPE: ICD-10-CM

## 2025-04-30 DIAGNOSIS — E66.812 CLASS 2 SEVERE OBESITY DUE TO EXCESS CALORIES WITH SERIOUS COMORBIDITY AND BODY MASS INDEX (BMI) OF 36.0 TO 36.9 IN ADULT: ICD-10-CM

## 2025-04-30 DIAGNOSIS — J45.20 MILD INTERMITTENT ASTHMA WITH ALLERGIC RHINITIS WITHOUT COMPLICATION: ICD-10-CM

## 2025-04-30 LAB
DLCO SINGLE BREATH LLN: 20.3
DLCO SINGLE BREATH PRE REF: 73.5 %
DLCO SINGLE BREATH REF: 26.03
DLCOC SBVA LLN: 3.51
DLCOC SBVA REF: 4.94
DLCOC SINGLE BREATH LLN: 20.3
DLCOC SINGLE BREATH REF: 26.03
DLCOCSBVAULN: 6.36
DLCOCSINGLEBREATHULN: 31.77
DLCOSINGLEBREATHULN: 31.77
DLCOSINGLEBREATHZSCORE: -1.98
DLCOVA LLN: 3.51
DLCOVA PRE REF: 90.7 %
DLCOVA PRE: 4.48 ML/(MIN*MMHG*L) (ref 3.51–6.36)
DLCOVA REF: 4.94
DLCOVAULN: 6.36
ERV LLN: -16448.97
ERV PRE REF: 27.7 %
ERV REF: 1.03
ERVULN: ABNORMAL
FEF 25 75 LLN: 2.05
FEF 25 75 PRE REF: 59 %
FEF 25 75 REF: 3.45
FET100 CHG: 8.3 %
FEV05 LLN: 1.28
FEV05 REF: 2.14
FEV1 CHG: 3.3 %
FEV1 FVC LLN: 71
FEV1 FVC PRE REF: 98.2 %
FEV1 FVC REF: 81
FEV1 LLN: 2.01
FEV1 PRE REF: 85.5 %
FEV1 REF: 2.63
FEV1 VOL CHG: 0.07
FEV1FVCZSCORE: -0.24
FEV1ZSCORE: -1.02
FRCPLETH LLN: 1.98
FRCPLETH PREREF: 89.8 %
FRCPLETH REF: 2.8
FRCPLETHULN: 3.62
FVC CHG: -1.2 %
FVC LLN: 2.51
FVC PRE REF: 86.6 %
FVC REF: 3.25
FVC VOL CHG: -0.03
FVCZSCORE: -0.96
IVC PRE: 2.63 L (ref 2.51–4.03)
IVC SINGLE BREATH LLN: 2.51
IVC SINGLE BREATH PRE REF: 81 %
IVC SINGLE BREATH REF: 3.25
IVCSINGLEBREATHULN: 4.03
LLN IC: -16447.6
PEF LLN: 4.58
PEF PRE REF: 93.6 %
PEF REF: 6.71
PHYSICIAN COMMENT: ABNORMAL
POST FEF 25 75: 2.74 L/S (ref 2.05–4.85)
POST FET 100: 6.32 SEC
POST FEV1 FVC: 83.43 % (ref 70.61–90.21)
POST FEV1: 2.32 L (ref 2.01–3.23)
POST FEV5: 1.91 L (ref 1.28–2.99)
POST FVC: 2.79 L (ref 2.51–4.03)
POST PEF: 5.32 L/S (ref 4.58–8.84)
PRE DLCO: 19.14 ML/(MIN*MMHG) (ref 20.3–31.77)
PRE ERV: 0.29 L (ref -16448.97–16451.03)
PRE FEF 25 75: 2.04 L/S (ref 2.05–4.85)
PRE FET 100: 5.84 SEC
PRE FEV05 REF: 80 %
PRE FEV1 FVC: 79.8 % (ref 70.61–90.21)
PRE FEV1: 2.25 L (ref 2.01–3.23)
PRE FEV5: 1.71 L (ref 1.28–2.99)
PRE FRC PL: 2.52 L (ref 1.98–3.62)
PRE FVC: 2.82 L (ref 2.51–4.03)
PRE IC: 2.53 L (ref -16447.6–16452.4)
PRE PEF: 6.28 L/S (ref 4.58–8.84)
PRE REF IC: 105.6 %
PRE RV: 2.23 L (ref 1.19–2.35)
PRE TLC: 5.05 L (ref 4.29–6.26)
RAW PRE REF: 202.8 %
RAW PRE: 6.2 CMH2O*S/L (ref 3.06–3.06)
RAW REF: 3.06
REF IC: 2.4
RV LLN: 1.19
RV PRE REF: 125.9 %
RV REF: 1.77
RVTLC LLN: 25
RVTLC PRE REF: 127.6 %
RVTLC PRE: 44.16 % (ref 25.01–44.19)
RVTLC REF: 35
RVTLCULN: 44
RVULN: 2.35
SGAW PRE REF: 51.3 %
SGAW PRE: 0.05 1/(CMH2O*S) (ref 0.1–0.1)
SGAW REF: 0.1
TLC LLN: 4.29
TLC PRE REF: 95.7 %
TLC REF: 5.27
TLC ULN: 6.26
TLCZSCORE: -0.38
ULN IC: ABNORMAL
VA PRE: 4.27 L (ref 5.12–5.12)
VA SINGLE BREATH LLN: 5.12
VA SINGLE BREATH PRE REF: 83.4 %
VA SINGLE BREATH REF: 5.12
VASINGLEBREATHULN: 5.12
VC LLN: 2.51
VC PRE REF: 86.6 %
VC PRE: 2.82 L (ref 2.51–4.03)
VC REF: 3.25
VC ULN: 4.03

## 2025-04-30 PROCEDURE — 71046 X-RAY EXAM CHEST 2 VIEWS: CPT | Mod: TC

## 2025-04-30 PROCEDURE — 71046 X-RAY EXAM CHEST 2 VIEWS: CPT | Mod: 26,,, | Performed by: RADIOLOGY

## 2025-04-30 PROCEDURE — 99999 PR PBB SHADOW E&M-EST. PATIENT-LVL IV: CPT | Mod: PBBFAC,,, | Performed by: INTERNAL MEDICINE

## 2025-04-30 RX ORDER — ALBUTEROL SULFATE 90 UG/1
2 INHALANT RESPIRATORY (INHALATION) EVERY 6 HOURS PRN
Qty: 18 G | Refills: 11 | Status: SHIPPED | OUTPATIENT
Start: 2025-04-30

## 2025-04-30 RX ORDER — IPRATROPIUM BROMIDE AND ALBUTEROL SULFATE 2.5; .5 MG/3ML; MG/3ML
3 SOLUTION RESPIRATORY (INHALATION) EVERY 6 HOURS PRN
Qty: 180 ML | Refills: 11 | Status: SHIPPED | OUTPATIENT
Start: 2025-04-30 | End: 2026-04-30

## 2025-04-30 RX ORDER — TEZEPELUMAB-EKKO 210 MG/1.9ML
210 INJECTION, SOLUTION SUBCUTANEOUS
Qty: 1.91 ML | Refills: 11 | Status: SHIPPED | OUTPATIENT
Start: 2025-04-30

## 2025-04-30 RX ORDER — FLUTICASONE PROPIONATE AND SALMETEROL 250; 50 UG/1; UG/1
1 POWDER RESPIRATORY (INHALATION) 2 TIMES DAILY
Qty: 60 EACH | Refills: 11 | Status: SHIPPED | OUTPATIENT
Start: 2025-04-30 | End: 2026-04-30

## 2025-04-30 NOTE — PROGRESS NOTES
History & Physical  [facility]    SUBJECTIVE:     Chief Complaint:   asthma    History of Present Illness:  Cristina Man is a 45 y.o. female who presents for evaluation of asthma. She has had asthma since she was 5 years-old. She experiences symptoms of chest tightness, shortness of breath, wheezing, & non-productive cough. She has been taking steroids twice a year for quite some time now. She just completed a course of prednisone two weeks ago. The steroids always help.    Her ACT score is 13 today.    She is using wixela 500/50. She uses an albuterol MDI. She does not have a nebulizer machine but would like to get one.    She is a lifetime nonsmoker.    No pets.    She works as a  for [employer].    She has tile gavin in her home with some carpet. Central air.    Review of patient's allergies indicates:  No Known Allergies    Past Medical History:   Diagnosis Date    Abnormal Pap smear of cervix     Allergic rhinitis due to allergen     Asthma at age 4    Hypertension     Migraines      Past Surgical History:   Procedure Laterality Date    COLONOSCOPY N/A 3/11/2024    Procedure: COLONOSCOPY;  Surgeon: Emely Braden MD;  Location: [address]);  Service: Endoscopy;  Laterality: N/A;  inst to email, peg, ref by Madelaine Hayden-MS  3/6-debby [email or web address]-MS    HEMORRHOID SURGERY  2009    HYSTERECTOMY  2015    REDUCTION OF BOTH BREASTS Bilateral 06/12/2020    Procedure: MAMMOPLASTY, REDUCTION, BILATERAL;  Surgeon: Marvin Levine MD;  Location: [facility];  Service: Plastics;  Laterality: Bilateral;    TOTAL REDUCTION MAMMOPLASTY       Family History   Problem Relation Name Age of Onset    Hypertension Mother      Cirrhosis Father          alcoholic    Hypertension Brother      Heart failure Maternal Grandmother      Hypertension Maternal Grandmother      Diabetes Maternal Grandmother      Hypertension Maternal Grandfather          DM    Diabetes Maternal Grandfather      Lung cancer  "Paternal Grandmother      Hypertension Paternal Grandmother      Diabetes Paternal Grandmother      Breast cancer Neg Hx      Colon cancer Neg Hx      Ovarian cancer Neg Hx       Social History[1]  Review of Systems:  No pertinent positives.    OBJECTIVE:     Vital Signs  Vitals:    04/30/25 1044   BP: 119/80   BP Location: Left arm   Patient Position: Sitting   Pulse: 69   SpO2: 97%   Height: 5' 6" (1.676 m)     Body mass index is 36.83 kg/m².    Physical Exam:  General: no distress  Eyes:  conjunctivae/corneas clear  Nose: no discharge  Neck: trachea midline with no masses appreciated  Lungs:  normal respiratory effort, no wheezes, no rales  Heart: regular rate and rhythm and no murmur  Abdomen: non-distended  Extremities: no cyanosis, no edema, no clubbing  Skin: No rashes or lesions. good skin turgor  Neurologic: alert, oriented, thought content appropriate    Laboratory:  Lab Results   Component Value Date    WBC 6.80 12/03/2024    HGB 13.0 12/03/2024    HCT 40.7 12/03/2024    MCV 93 12/03/2024     12/03/2024     Chest Imaging, My Impression:   CXR 7/2022: normal    Diagnostic Results:  PFT today: no obstruction, no restriction, DLCO/VA is normal    ASSESSMENT/PLAN:     Severe Persistent Asthma  - She has needed steroids many times during the course of her life. Provided education & counseling about the risk of steroids. We need to do a better job preventing asthma exacerbations so that she doesn't need the steroids.  - Recommend starting Tezspire. This treatment has been proven to decrease asthma exacerbations & need for steroids.  - Continue wixela inhaler one puff BID. Rinse mouth after each use.  - Continue prn albuterol MDI & prn nebulizer.    Obesity 36  - Counseled pt on weight loss & asthma control.    Chris Rodgers MD  [facility]         [1]   Social History  Socioeconomic History    Marital status:     Number of children: 2   Occupational History    Occupation: water "       Employer: [employer]   Tobacco Use    Smoking status: Never    Smokeless tobacco: Never   Substance and Sexual Activity    Alcohol use: Yes     Comment: occ    Drug use: No    Sexual activity: Yes     Partners: Male     Birth control/protection: None     Social Drivers of Health     Financial Resource Strain: Low Risk  (4/30/2025)    Overall Financial Resource Strain (CARDIA)     Difficulty of Paying Living Expenses: Not very hard   Food Insecurity: No Food Insecurity (4/30/2025)    Hunger Vital Sign     Worried About Running Out of Food in the Last Year: Never true     Ran Out of Food in the Last Year: Never true   Transportation Needs: No Transportation Needs (4/30/2025)    PRAPARE - Transportation     Lack of Transportation (Medical): No     Lack of Transportation (Non-Medical): No   Physical Activity: Unknown (4/30/2025)    Exercise Vital Sign     Days of Exercise per Week: 2 days   Stress: No Stress Concern Present (4/30/2025)    [employer] - Occupational Stress Questionnaire     Feeling of Stress : Only a little   Housing Stability: Low Risk  (4/30/2025)    Housing Stability Vital Sign     Unable to Pay for Housing in the Last Year: No     Homeless in the Last Year: No

## 2025-05-06 PROBLEM — R06.00 DYSPNEA: Status: ACTIVE | Noted: 2025-05-06

## 2025-05-06 PROBLEM — J45.50 SEVERE PERSISTENT ASTHMA WITHOUT COMPLICATION: Status: ACTIVE | Noted: 2025-05-06

## 2025-06-02 ENCOUNTER — TELEPHONE (OUTPATIENT)
Dept: PULMONOLOGY | Facility: CLINIC | Age: 45
End: 2025-06-02
Payer: COMMERCIAL

## 2025-09-02 ENCOUNTER — OFFICE VISIT (OUTPATIENT)
Dept: INTERNAL MEDICINE | Facility: CLINIC | Age: 45
End: 2025-09-02
Payer: COMMERCIAL

## 2025-09-02 ENCOUNTER — LAB VISIT (OUTPATIENT)
Dept: LAB | Facility: HOSPITAL | Age: 45
End: 2025-09-02
Attending: INTERNAL MEDICINE
Payer: COMMERCIAL

## 2025-09-02 VITALS
WEIGHT: 227.5 LBS | DIASTOLIC BLOOD PRESSURE: 88 MMHG | OXYGEN SATURATION: 97 % | SYSTOLIC BLOOD PRESSURE: 146 MMHG | HEART RATE: 75 BPM | HEIGHT: 66 IN | BODY MASS INDEX: 36.56 KG/M2

## 2025-09-02 DIAGNOSIS — J45.50 SEVERE PERSISTENT ASTHMA WITHOUT COMPLICATION: ICD-10-CM

## 2025-09-02 DIAGNOSIS — G43.009 MIGRAINE WITHOUT AURA AND WITHOUT STATUS MIGRAINOSUS, NOT INTRACTABLE: ICD-10-CM

## 2025-09-02 DIAGNOSIS — E66.01 CLASS 2 SEVERE OBESITY DUE TO EXCESS CALORIES WITH SERIOUS COMORBIDITY AND BODY MASS INDEX (BMI) OF 36.0 TO 36.9 IN ADULT: ICD-10-CM

## 2025-09-02 DIAGNOSIS — Z00.00 ANNUAL PHYSICAL EXAM: Primary | ICD-10-CM

## 2025-09-02 DIAGNOSIS — R73.03 PREDIABETES: ICD-10-CM

## 2025-09-02 DIAGNOSIS — I10 BENIGN ESSENTIAL HYPERTENSION: ICD-10-CM

## 2025-09-02 DIAGNOSIS — E66.812 CLASS 2 SEVERE OBESITY DUE TO EXCESS CALORIES WITH SERIOUS COMORBIDITY AND BODY MASS INDEX (BMI) OF 36.0 TO 36.9 IN ADULT: ICD-10-CM

## 2025-09-02 DIAGNOSIS — Z00.00 ANNUAL PHYSICAL EXAM: ICD-10-CM

## 2025-09-02 DIAGNOSIS — R23.2 HOT FLASHES: ICD-10-CM

## 2025-09-02 DIAGNOSIS — Z23 NEED FOR VACCINATION: ICD-10-CM

## 2025-09-02 DIAGNOSIS — R35.0 URINARY FREQUENCY: ICD-10-CM

## 2025-09-02 DIAGNOSIS — Z12.31 SCREENING MAMMOGRAM, ENCOUNTER FOR: ICD-10-CM

## 2025-09-02 PROBLEM — J45.41 MODERATE PERSISTENT ASTHMA WITH ACUTE EXACERBATION: Status: RESOLVED | Noted: 2025-04-30 | Resolved: 2025-09-02

## 2025-09-02 PROBLEM — R06.00 DYSPNEA: Status: RESOLVED | Noted: 2025-05-06 | Resolved: 2025-09-02

## 2025-09-02 LAB
25(OH)D3+25(OH)D2 SERPL-MCNC: 20 NG/ML (ref 30–96)
ALBUMIN SERPL BCP-MCNC: 3.8 G/DL (ref 3.5–5.2)
ALP SERPL-CCNC: 76 UNIT/L (ref 40–150)
ALT SERPL W/O P-5'-P-CCNC: 14 UNIT/L (ref 0–55)
ANION GAP (OHS): 10 MMOL/L (ref 8–16)
AST SERPL-CCNC: 18 UNIT/L (ref 0–50)
BILIRUB SERPL-MCNC: 0.4 MG/DL (ref 0.1–1)
BUN SERPL-MCNC: 12 MG/DL (ref 6–20)
CALCIUM SERPL-MCNC: 9.2 MG/DL (ref 8.7–10.5)
CHLORIDE SERPL-SCNC: 104 MMOL/L (ref 95–110)
CHOLEST SERPL-MCNC: 212 MG/DL (ref 120–199)
CHOLEST/HDLC SERPL: 3.4 {RATIO} (ref 2–5)
CO2 SERPL-SCNC: 23 MMOL/L (ref 23–29)
CREAT SERPL-MCNC: 0.7 MG/DL (ref 0.5–1.4)
EAG (OHS): 123 MG/DL (ref 68–131)
GFR SERPLBLD CREATININE-BSD FMLA CKD-EPI: >60 ML/MIN/1.73/M2
GLUCOSE SERPL-MCNC: 106 MG/DL (ref 70–110)
HBA1C MFR BLD: 5.9 % (ref 4–5.6)
HDLC SERPL-MCNC: 62 MG/DL (ref 40–75)
HDLC SERPL: 29.2 % (ref 20–50)
LDLC SERPL CALC-MCNC: 132.2 MG/DL (ref 63–159)
NONHDLC SERPL-MCNC: 150 MG/DL
POTASSIUM SERPL-SCNC: 4 MMOL/L (ref 3.5–5.1)
PROT SERPL-MCNC: 7.2 GM/DL (ref 6–8.4)
SODIUM SERPL-SCNC: 137 MMOL/L (ref 136–145)
TRIGL SERPL-MCNC: 89 MG/DL (ref 30–150)
TSH SERPL-ACNC: 0.73 UIU/ML (ref 0.4–4)

## 2025-09-02 PROCEDURE — 3079F DIAST BP 80-89 MM HG: CPT | Mod: CPTII,S$GLB,, | Performed by: INTERNAL MEDICINE

## 2025-09-02 PROCEDURE — 3008F BODY MASS INDEX DOCD: CPT | Mod: CPTII,S$GLB,, | Performed by: INTERNAL MEDICINE

## 2025-09-02 PROCEDURE — 99396 PREV VISIT EST AGE 40-64: CPT | Mod: 25,S$GLB,, | Performed by: INTERNAL MEDICINE

## 2025-09-02 PROCEDURE — 84443 ASSAY THYROID STIM HORMONE: CPT

## 2025-09-02 PROCEDURE — 3044F HG A1C LEVEL LT 7.0%: CPT | Mod: CPTII,S$GLB,, | Performed by: INTERNAL MEDICINE

## 2025-09-02 PROCEDURE — 80053 COMPREHEN METABOLIC PANEL: CPT

## 2025-09-02 PROCEDURE — 1160F RVW MEDS BY RX/DR IN RCRD: CPT | Mod: CPTII,S$GLB,, | Performed by: INTERNAL MEDICINE

## 2025-09-02 PROCEDURE — 90661 CCIIV3 VAC ABX FR 0.5 ML IM: CPT | Mod: S$GLB,,, | Performed by: INTERNAL MEDICINE

## 2025-09-02 PROCEDURE — 90471 IMMUNIZATION ADMIN: CPT | Mod: S$GLB,,, | Performed by: INTERNAL MEDICINE

## 2025-09-02 PROCEDURE — 1159F MED LIST DOCD IN RCRD: CPT | Mod: CPTII,S$GLB,, | Performed by: INTERNAL MEDICINE

## 2025-09-02 PROCEDURE — 99999 PR PBB SHADOW E&M-EST. PATIENT-LVL V: CPT | Mod: PBBFAC,,, | Performed by: INTERNAL MEDICINE

## 2025-09-02 PROCEDURE — 3077F SYST BP >= 140 MM HG: CPT | Mod: CPTII,S$GLB,, | Performed by: INTERNAL MEDICINE

## 2025-09-02 PROCEDURE — 36415 COLL VENOUS BLD VENIPUNCTURE: CPT

## 2025-09-02 PROCEDURE — 82465 ASSAY BLD/SERUM CHOLESTEROL: CPT

## 2025-09-02 PROCEDURE — 83036 HEMOGLOBIN GLYCOSYLATED A1C: CPT

## 2025-09-02 PROCEDURE — 82306 VITAMIN D 25 HYDROXY: CPT

## 2025-09-02 RX ORDER — AMLODIPINE BESYLATE 5 MG/1
5 TABLET ORAL DAILY
Qty: 90 TABLET | Refills: 3 | Status: SHIPPED | OUTPATIENT
Start: 2025-09-02 | End: 2026-09-02

## (undated) DEVICE — SEE MEDLINE ITEM 153151

## (undated) DEVICE — PAD ABD 8X10 STERILE

## (undated) DEVICE — BLADE SURG STAINLESS STEEL #10

## (undated) DEVICE — SUT STRATAFIX PGAPCL 4 FS-2

## (undated) DEVICE — ELECTRODE REM PLYHSV RETURN 9

## (undated) DEVICE — SEE MEDLINE ITEM 152622

## (undated) DEVICE — NDL HYPO REG 25G X 1 1/2

## (undated) DEVICE — APPLIER LIGACLIP MED 11IN

## (undated) DEVICE — SEE MEDLINE ITEM 157131

## (undated) DEVICE — SKIN MARKER DEVON 160

## (undated) DEVICE — STAPLER SKIN ROTATING HEAD

## (undated) DEVICE — ADHESIVE DERMABOND ADVANCED

## (undated) DEVICE — SPONGE LAP 18X18 PREWASHED

## (undated) DEVICE — SYS PRINEO SKIN CLOSURE

## (undated) DEVICE — SUT VICRYL PLUS 3-0 SH 18IN

## (undated) DEVICE — DRAIN WND 15FRX3/16X4.7MM TRCR

## (undated) DEVICE — KIT SURGIFLO HEMOSTATIC MATRIX

## (undated) DEVICE — NDL 18GA X1 1/2 REG BEVEL

## (undated) DEVICE — CLOSURE SKIN STERI STRIP 1/2X4

## (undated) DEVICE — PAD UNDERPAD 30X30

## (undated) DEVICE — SUT ETHILON 2-0 FS 18IN BLK

## (undated) DEVICE — SUT PDS II 2-0 CT-2 VIL

## (undated) DEVICE — EVACUATOR WOUND BULB 100CC

## (undated) DEVICE — PACK UNIV PROCEDURE

## (undated) DEVICE — BLADE SURG STAINLESS STEEL #15

## (undated) DEVICE — SUT MCRYL PLUS 4-0 PS2 27IN

## (undated) DEVICE — SPONGE DERMACEA GAUZE 4X4

## (undated) DEVICE — PENCIL ELECTROSURG HOLST W/BLD

## (undated) DEVICE — DRESSING XEROFORM 1X8IN

## (undated) DEVICE — SUT VICRYL PLUS 2-0 CT1 18